# Patient Record
Sex: FEMALE | Race: OTHER | Employment: FULL TIME | URBAN - NONMETROPOLITAN AREA
[De-identification: names, ages, dates, MRNs, and addresses within clinical notes are randomized per-mention and may not be internally consistent; named-entity substitution may affect disease eponyms.]

---

## 2017-07-25 ENCOUNTER — HOSPITAL ENCOUNTER (OUTPATIENT)
Dept: CT IMAGING | Age: 52
Discharge: HOME | End: 2017-07-25
Payer: COMMERCIAL

## 2017-07-25 DIAGNOSIS — R10.9 ABDOMINAL PAIN, UNSPECIFIED LOCATION: ICD-10-CM

## 2017-07-25 PROCEDURE — 6360000004 HC RX CONTRAST MEDICATION: Performed by: FAMILY MEDICINE

## 2017-07-25 PROCEDURE — 74177 CT ABD & PELVIS W/CONTRAST: CPT

## 2017-07-25 RX ADMIN — IOPAMIDOL 100 ML: 755 INJECTION, SOLUTION INTRAVENOUS at 10:51

## 2017-07-25 RX ADMIN — IOHEXOL 50 ML: 240 INJECTION, SOLUTION INTRATHECAL; INTRAVASCULAR; INTRAVENOUS; ORAL at 10:51

## 2019-01-18 ENCOUNTER — HOSPITAL ENCOUNTER (OUTPATIENT)
Dept: CT IMAGING | Age: 54
Discharge: HOME | End: 2019-01-18
Payer: COMMERCIAL

## 2019-01-18 DIAGNOSIS — R35.89 POLYURIA: ICD-10-CM

## 2019-01-18 DIAGNOSIS — R10.9 ABDOMINAL PAIN, UNSPECIFIED ABDOMINAL LOCATION: ICD-10-CM

## 2019-01-18 PROCEDURE — 6360000004 HC RX CONTRAST MEDICATION: Performed by: FAMILY MEDICINE

## 2019-01-18 PROCEDURE — 74177 CT ABD & PELVIS W/CONTRAST: CPT

## 2019-01-18 RX ADMIN — IOHEXOL 50 ML: 240 INJECTION, SOLUTION INTRATHECAL; INTRAVASCULAR; INTRAVENOUS; ORAL at 14:22

## 2019-01-18 RX ADMIN — IOPAMIDOL 100 ML: 755 INJECTION, SOLUTION INTRAVENOUS at 14:21

## 2019-01-28 ENCOUNTER — HOSPITAL ENCOUNTER (OUTPATIENT)
Dept: ULTRASOUND IMAGING | Age: 54
Discharge: HOME | End: 2019-01-28
Payer: COMMERCIAL

## 2019-01-28 DIAGNOSIS — N83.8 ENLARGED OVARY: ICD-10-CM

## 2019-01-28 PROCEDURE — 76830 TRANSVAGINAL US NON-OB: CPT

## 2019-03-22 ENCOUNTER — OFFICE VISIT (OUTPATIENT)
Dept: SURGERY | Age: 54
End: 2019-03-22
Payer: COMMERCIAL

## 2019-03-22 VITALS
DIASTOLIC BLOOD PRESSURE: 70 MMHG | HEART RATE: 75 BPM | RESPIRATION RATE: 20 BRPM | WEIGHT: 196 LBS | HEIGHT: 60 IN | SYSTOLIC BLOOD PRESSURE: 128 MMHG | TEMPERATURE: 98.2 F | OXYGEN SATURATION: 99 % | BODY MASS INDEX: 38.48 KG/M2

## 2019-03-22 DIAGNOSIS — K59.09 OTHER CONSTIPATION: ICD-10-CM

## 2019-03-22 DIAGNOSIS — R10.32 ABDOMINAL PAIN, LEFT LOWER QUADRANT: Primary | ICD-10-CM

## 2019-03-22 DIAGNOSIS — K64.8 HEMORRHOIDS, INTERNAL: ICD-10-CM

## 2019-03-22 PROCEDURE — 3017F COLORECTAL CA SCREEN DOC REV: CPT | Performed by: SURGERY

## 2019-03-22 PROCEDURE — G8484 FLU IMMUNIZE NO ADMIN: HCPCS | Performed by: SURGERY

## 2019-03-22 PROCEDURE — 99213 OFFICE O/P EST LOW 20 MIN: CPT | Performed by: SURGERY

## 2019-03-22 PROCEDURE — 1036F TOBACCO NON-USER: CPT | Performed by: SURGERY

## 2019-03-22 PROCEDURE — G8427 DOCREV CUR MEDS BY ELIG CLIN: HCPCS | Performed by: SURGERY

## 2019-03-22 PROCEDURE — G8417 CALC BMI ABV UP PARAM F/U: HCPCS | Performed by: SURGERY

## 2019-03-22 RX ORDER — HYDROCORTISONE ACETATE 25 MG/1
25 SUPPOSITORY RECTAL 2 TIMES DAILY PRN
Qty: 20 SUPPOSITORY | Refills: 0 | Status: SHIPPED | OUTPATIENT
Start: 2019-03-22 | End: 2019-04-09

## 2019-03-22 ASSESSMENT — ENCOUNTER SYMPTOMS
EYE REDNESS: 0
WHEEZING: 0
ANAL BLEEDING: 0
COLOR CHANGE: 0
BACK PAIN: 0
SINUS PRESSURE: 0
ABDOMINAL DISTENTION: 1
APNEA: 0
STRIDOR: 0
CHOKING: 0
SINUS PAIN: 0
CHEST TIGHTNESS: 0
RHINORRHEA: 0
SHORTNESS OF BREATH: 0
VOMITING: 0
BLOOD IN STOOL: 0
TROUBLE SWALLOWING: 0
EYE ITCHING: 0
EYE DISCHARGE: 0
NAUSEA: 0
FACIAL SWELLING: 0
ABDOMINAL PAIN: 1
RECTAL PAIN: 0
CONSTIPATION: 1
VOICE CHANGE: 0
PHOTOPHOBIA: 0
SORE THROAT: 0
DIARRHEA: 0
EYE PAIN: 0
COUGH: 0

## 2019-03-25 ENCOUNTER — TELEPHONE (OUTPATIENT)
Dept: SURGERY | Age: 54
End: 2019-03-25

## 2019-04-08 PROBLEM — K59.09 OTHER CONSTIPATION: Status: ACTIVE | Noted: 2019-04-08

## 2019-04-08 NOTE — PROGRESS NOTES
[facility] Steward. [facility]  Established Patient Evaluation in Office  Pt Name: Jose Luis Alston  Date of Birth 1965   Today's Date: 4/9/2019  Medical Record Number: [id number]  Referring Provider: No ref. provider found  Primary Care Provider: Amy Yee MD  Chief Complaint   Patient presents with    2 Week Follow-Up     Last seen in the office on 3/22/19-Abdominal pain,LLQ pain,hemorrhoids     ASSESSMENT       Diagnosis Orders   1. Abdominal pain, left lower quadrant     2. Other constipation     3. Hemorrhoids, internal       Past Medical History:   Diagnosis Date    Anxiety     Depression     Not taking any medications    Diverticulitis     Hypertension     Interstitial cystitis     OCD (obsessive compulsive disorder)     Restless leg syndrome     Takes medications as needed    Sleep apnea     uses cpap      PLANS       1. Her symptoms have almost resolved just with fiber therapy and anusol cream.  2. Continue to encourage dietary fiber, wean use of anusol. 3. No evidence of diverticulitis clinically or by CT scan  4. No surgical intervention indicated or required  5. Follow up as needed. Ana Hung is a 62 y. o.female who presents with abdominal pain, constipation, hemorrhoids. She had a robotic sigmoid colectomy in 2016 for diverticular disease after several significant episodes. She has been having some abdominal pain on the LLQ for about 2 months. She describes sharp pain, with some radiation to the left flank and across the lower abdomen of moderate severity. Films performed and listed below. She was seen by Gyne who thought the left ovary pathology was not significant and sent here for evaluation. She also complains of constipation but does not drink much water or have much fiber intake. She states shes active which includes working every day but no exercise.  She does not \"look\" at her bowel habits to answer any questions about consistency or color. She also complains of rectal pain with the bowel movements and feels like she is \"sitting\" on something after bowel movements. Since last office visit, she has been using fibercon tablets. She is having regular soft bowel movements and no further anal pain. The only time she has abdominal pain is when she forgets to take the fibercon. I have personally reviewed the following films:  CT 1/18/19 showing an adnexal cyst not colonic inflammation or other general surgery pathology. Past Medical History  Past Medical History:   Diagnosis Date    Anxiety     Depression     Not taking any medications    Diverticulitis     Hypertension     Interstitial cystitis     OCD (obsessive compulsive disorder)     Restless leg syndrome     Takes medications as needed    Sleep apnea     uses cpap     Past Surgical History  Past Surgical History:   Procedure Laterality Date    ABDOMINAL ADHESION SURGERY  10-13-09    BLADDER SURGERY  10-13-09    cystoscopy    CERVICAL DISC SURGERY  2015    COLECTOMY  2016    Dr. Patria Mcintyre      last one 2015??    ENDOMETRIAL BIOPSY  9-21-09    EYE SURGERY      lasik    FOOT SURGERY Left 02/23/2017    LEODAN/AKIN LEFT (LEODAN BUNIONECTOMY/RADHA OSTEOTOMY, DEROTATIONAL ARTHROPLASTY 5TH.    HYSTERECTOMY  10-13-09    partial     Medications  Current Outpatient Medications   Medication Sig Dispense Refill    citalopram (CELEXA) 20 MG tablet   3    hydrocortisone (ANUSOL-HC) 2.5 % rectal cream Place rectally 2 times daily Place rectally 2 times daily.  lisinopril-hydrochlorothiazide (PRINZIDE;ZESTORETIC) 10-12.5 MG per tablet Take 1 tablet by mouth daily. No current facility-administered medications for this visit. Allergies  has No Known Allergies. Family History  family history includes Cirrhosis in her father; Diabetes in her father and sister; Heart Disease in her father; High Blood Pressure in her father and sister.   Social History   reports that she has never smoked. She has never used smokeless tobacco. She reports that she drinks alcohol. She reports that she does not use drugs. Health Screening Exams  Health Maintenance   Topic Date Due    Hepatitis C screen  1965    HIV screen  02/19/1978    DTaP/Tdap/Td vaccine (1 - Tdap) 02/19/1982    Cervical cancer screen  02/19/1984    Lipid screen  02/19/2003    Diabetes screen  02/19/2003    Breast cancer screen  02/19/2013    Shingles Vaccine (1 of 2) 02/19/2013    Colon cancer screen colonoscopy  02/19/2013    Potassium monitoring  02/11/2018    Creatinine monitoring  02/11/2018    Flu vaccine (Season Ended) 09/01/2019    Pneumococcal 0-64 years Vaccine  Aged Out     Review of Systems  Constitutional: Positive for activity change and fatigue. Negative for appetite change, chills, diaphoresis, fever and unexpected weight change. HENT: Negative for congestion, dental problem, drooling, ear discharge, ear pain, facial swelling, hearing loss, mouth sores, nosebleeds, postnasal drip, rhinorrhea, sinus pressure, sinus pain, sneezing, sore throat, tinnitus, trouble swallowing and voice change. Eyes: Negative for photophobia, pain, discharge, redness, itching and visual disturbance. Respiratory: Negative for apnea, cough, choking, chest tightness, shortness of breath, wheezing and stridor. Cardiovascular: Negative for chest pain, palpitations and leg swelling. Gastrointestinal:  Negative for anal bleeding, blood in stool, diarrhea, nausea, rectal pain and vomiting. Genitourinary: Negative for decreased urine volume, difficulty urinating, dyspareunia, dysuria, enuresis, flank pain, frequency, genital sores, hematuria, menstrual problem, pelvic pain, urgency, vaginal bleeding, vaginal discharge and vaginal pain. Musculoskeletal: Negative for arthralgias, back pain, gait problem, joint swelling, myalgias, neck pain and neck stiffness.    Skin: Negative for color change, pallor, rash and wound.   Neurological: Positive for headaches. Negative for dizziness, tremors, seizures, syncope, facial asymmetry, speech difficulty, weakness, light-headedness and numbness. Hematological: Negative for adenopathy. Does not bruise/bleed easily. Psychiatric/Behavioral: Negative for agitation, behavioral problems, confusion, decreased concentration, dysphoric mood, hallucinations, self-injury, sleep disturbance and suicidal ideas. The patient is not nervous/anxious and is not hyperactive. OBJECTIVE    VITALS:  height is 5' (1.524 m) and weight is 199 lb (90.3 kg). Her tympanic temperature is 98.5 °F (36.9 °C). Her blood pressure is 116/78 and her pulse is 71. Her respiration is 18 and oxygen saturation is 98%. Pain Score:   3 Body mass index is 38.86 kg/m². CONSTITUTIONAL: Alert and oriented times 3, no acute distress and cooperative to examination with proper mood and affect. SKIN: Skin color, texture, turgor normal. No rashes or lesions. LYMPH: no cervical nodes, no inguinal nodes  HEENT: Head is normocephalic, atraumatic. EOMI, PERRLA. NECK: Supple, symmetrical, trachea midline, no adenopathy, thyroid symmetric, not enlarged and no tenderness, skin normal.  CHEST/LUNGS: chest symmetric with normal A/P diameter, normal respiratory rate and rhythm, lungs clear to auscultation without wheezes, rales or rhonchi. No accessory muscle use. Scars None. CARDIOVASCULAR: Heart sounds are normal.  Regular rate and rhythm without murmur, gallop or rub. Normal S1 and S2. Carotid and femoral pulses 2+/4 and equal bilaterally. ABDOMEN: obese robotic scar(s) present. Normal bowel sounds. No bruits. Soft, non distended, no masses or organomegaly. no evidence of hernia. Percussion: Normal without hepatosplenomegally. Tenderness on the left lateral portion of her extraction scar has improved. RECTAL: deferred, not clinically indicated  NEUROLOGIC: There are no focalizing motor or sensory deficits.  CN II-XII are

## 2019-04-09 ENCOUNTER — OFFICE VISIT (OUTPATIENT)
Dept: SURGERY | Age: 54
End: 2019-04-09
Payer: COMMERCIAL

## 2019-04-09 VITALS
DIASTOLIC BLOOD PRESSURE: 78 MMHG | BODY MASS INDEX: 39.07 KG/M2 | TEMPERATURE: 98.5 F | SYSTOLIC BLOOD PRESSURE: 116 MMHG | HEIGHT: 60 IN | HEART RATE: 71 BPM | WEIGHT: 199 LBS | RESPIRATION RATE: 18 BRPM | OXYGEN SATURATION: 98 %

## 2019-04-09 DIAGNOSIS — R10.32 ABDOMINAL PAIN, LEFT LOWER QUADRANT: Primary | ICD-10-CM

## 2019-04-09 DIAGNOSIS — K64.8 HEMORRHOIDS, INTERNAL: ICD-10-CM

## 2019-04-09 DIAGNOSIS — K59.09 OTHER CONSTIPATION: ICD-10-CM

## 2019-04-09 PROCEDURE — 1036F TOBACCO NON-USER: CPT | Performed by: SURGERY

## 2019-04-09 PROCEDURE — G8427 DOCREV CUR MEDS BY ELIG CLIN: HCPCS | Performed by: SURGERY

## 2019-04-09 PROCEDURE — 99212 OFFICE O/P EST SF 10 MIN: CPT | Performed by: SURGERY

## 2019-04-09 PROCEDURE — 3017F COLORECTAL CA SCREEN DOC REV: CPT | Performed by: SURGERY

## 2019-04-09 PROCEDURE — G8417 CALC BMI ABV UP PARAM F/U: HCPCS | Performed by: SURGERY

## 2019-04-09 RX ORDER — CITALOPRAM 20 MG/1
TABLET ORAL
Refills: 3 | COMMUNITY
Start: 2019-03-28 | End: 2019-10-30

## 2019-04-09 NOTE — LETTER
[facility]  Kacie Sena. Jesusita Catalan  Phone- [phone]  Fax [phone]    Pt Name: Mihaela Mittal  Medical Record Number: [id number]  Date of Birth 1965   Today's Date: 4/9/2019    Morenita Sherwood was evaluated in the office today. My assessment and plans are listed below. ASSESSMENT         Diagnosis Orders   1. Abdominal pain, left lower quadrant     2. Other constipation     3. Hemorrhoids, internal       Past Medical History:   Diagnosis Date    Anxiety     Depression     Not taking any medications    Diverticulitis     Hypertension     Interstitial cystitis     OCD (obsessive compulsive disorder)     Restless leg syndrome     Takes medications as needed    Sleep apnea     uses cpap     PLANS:       1. Her symptoms have almost resolved just with fiber therapy and anusol cream.  2. Continue to encourage dietary fiber, wean use of anusol. 3. No evidence of diverticulitis clinically or by CT scan  4. No surgical intervention indicated or required  5. Follow up as needed. If I can provide any additional assistance or you have any concerns, please feel free to contact me. Thank you for allowing to participate in the care of your patients. Sincerely,      Jesusita Ramirez

## 2019-08-23 ENCOUNTER — HOSPITAL ENCOUNTER (OUTPATIENT)
Dept: PULMONOLOGY | Age: 54
Discharge: HOME | End: 2019-08-23
Payer: COMMERCIAL

## 2019-08-23 PROCEDURE — 94010 BREATHING CAPACITY TEST: CPT

## 2019-09-02 NOTE — PROGRESS NOTES
CPAP device. She follows with Dr. Layne Ochoa at [place] [address] for her sleep apnea management. -Depression. -Anxiety.  -Hypertension.  -Restless leg syndrome. she is currently on treatment with Mirapex. She follows with Dr. María Sky.  -S/p Hysterectomy. -Acute maxillary sinusitis- not under control. Recommendations/Plan:  -Please obtain her CXR films on a CD dated 05/09/2019 done at [address] before next clinic visit. - Start patient on Flonase 50mcg/INH 2sprays each nostril daily. She  was informed about adverse effects of Flonase. She was demonstrated in my office how to use Flonase. She verbalizes understanding.  -She was advised to stop taking Amoxicillin- no significant response.  - Doxycycline 100mg po bid for 14 days . No refills.  -She was advised to stop taking Losartan ( Cozaar) due to her chronic cough ( May be side effect from ARB inhibitor i.e Losartan) after discussing with her family physician. Her family physician need to start on alternate medication with out cough as side effect for optimal control of blood pressure. - Schedule patient for full pulmonary function tests before clinic visit if her cough don't improve. To be done 2days before clinic visit.  -Schedule patient for Methacholine challenge test before clinic visit  to further evaluate for hyperreactive air way disease before clinic visit  if her cough don't improve. To be done 2days before clinic visit.  -She was advised to continue current Prilosec( Omeprazole) 40mg po daily as prescribed by her family physician.  -The pathophysiology of reflux is discussed. Anti-reflux measures such as raising the head of the bed, avoiding tight clothing or belts, avoiding eating late at night and not lying down shortly after mealtime and achieving weight loss are discussed.  Avoid ASA, NSAID's, caffeine, peppermints, alcohol and tobacco. Patient should alert me if there are persistent symptoms,

## 2019-09-09 ENCOUNTER — HOSPITAL ENCOUNTER (OUTPATIENT)
Dept: GENERAL RADIOLOGY | Age: 54
Discharge: HOME | End: 2019-09-09

## 2019-09-09 ENCOUNTER — OFFICE VISIT (OUTPATIENT)
Dept: PULMONOLOGY | Age: 54
End: 2019-09-09
Payer: COMMERCIAL

## 2019-09-09 VITALS
HEART RATE: 73 BPM | BODY MASS INDEX: 38.95 KG/M2 | OXYGEN SATURATION: 97 % | WEIGHT: 198.4 LBS | HEIGHT: 60 IN | TEMPERATURE: 99.1 F | DIASTOLIC BLOOD PRESSURE: 86 MMHG | SYSTOLIC BLOOD PRESSURE: 124 MMHG

## 2019-09-09 DIAGNOSIS — Z00.6 EXAMINATION FOR NORMAL COMPARISON FOR CLINICAL RESEARCH: ICD-10-CM

## 2019-09-09 DIAGNOSIS — J01.00 ACUTE MAXILLARY SINUSITIS, RECURRENCE NOT SPECIFIED: ICD-10-CM

## 2019-09-09 DIAGNOSIS — R05.3 CHRONIC COUGH: Primary | ICD-10-CM

## 2019-09-09 DIAGNOSIS — J30.9 ALLERGIC RHINITIS, UNSPECIFIED SEASONALITY, UNSPECIFIED TRIGGER: ICD-10-CM

## 2019-09-09 PROCEDURE — 3017F COLORECTAL CA SCREEN DOC REV: CPT | Performed by: INTERNAL MEDICINE

## 2019-09-09 PROCEDURE — 1036F TOBACCO NON-USER: CPT | Performed by: INTERNAL MEDICINE

## 2019-09-09 PROCEDURE — 3209999900 XR COMPARISON OF OUTSIDE FILMS

## 2019-09-09 PROCEDURE — G8417 CALC BMI ABV UP PARAM F/U: HCPCS | Performed by: INTERNAL MEDICINE

## 2019-09-09 PROCEDURE — 99205 OFFICE O/P NEW HI 60 MIN: CPT | Performed by: INTERNAL MEDICINE

## 2019-09-09 PROCEDURE — G8427 DOCREV CUR MEDS BY ELIG CLIN: HCPCS | Performed by: INTERNAL MEDICINE

## 2019-09-09 RX ORDER — HYDROCHLOROTHIAZIDE 12.5 MG/1
25 TABLET ORAL DAILY
COMMUNITY
End: 2022-06-24 | Stop reason: SDUPTHER

## 2019-09-09 RX ORDER — LOSARTAN POTASSIUM 50 MG/1
50 TABLET ORAL DAILY
COMMUNITY
End: 2019-10-30 | Stop reason: ALTCHOICE

## 2019-09-09 RX ORDER — FLUTICASONE PROPIONATE 50 MCG
2 SPRAY, SUSPENSION (ML) NASAL DAILY
Qty: 1 BOTTLE | Refills: 11 | Status: SHIPPED | OUTPATIENT
Start: 2019-09-09 | End: 2022-06-24

## 2019-09-09 RX ORDER — BUSPIRONE HYDROCHLORIDE 10 MG/1
10 TABLET ORAL 2 TIMES DAILY
COMMUNITY
End: 2022-07-08

## 2019-09-09 RX ORDER — ESCITALOPRAM OXALATE 20 MG/1
20 TABLET ORAL DAILY
COMMUNITY

## 2019-09-09 RX ORDER — DOXYCYCLINE HYCLATE 100 MG/1
100 CAPSULE ORAL 2 TIMES DAILY
Qty: 28 CAPSULE | Refills: 0 | Status: SHIPPED | OUTPATIENT
Start: 2019-09-09 | End: 2019-09-23

## 2019-09-09 RX ORDER — MONTELUKAST SODIUM 10 MG/1
10 TABLET ORAL NIGHTLY
COMMUNITY

## 2019-09-09 RX ORDER — OMEPRAZOLE 40 MG/1
40 CAPSULE, DELAYED RELEASE ORAL DAILY
COMMUNITY

## 2019-09-09 NOTE — PATIENT INSTRUCTIONS
Recommendations/Plan:  -Please obtain her CXR films on a CD dated 05/09/2019 done at [address] before next clinic visit. - Start patient on Flonase 50mcg/INH 2sprays each nostril daily. She  was informed about adverse effects of Flonase. She was demonstrated in my office how to use Flonase. She verbalizes understanding.  -She was advised to stop taking Amoxicillin- no significant response.  - Doxycycline 100mg po bid for 14 days . No refills.  -She was advised to stop taking Losartan ( Cozaar) due to her chronic cough ( May be side effect from ARB inhibitor i.e Losartan) after discussing with her family physician. Her family physician need to start on alternate medication with out cough as side effect for optimal control of blood pressure. - Schedule patient for full pulmonary function tests before clinic visit if her cough don't improve. To be done 2days before clinic visit.  -Schedule patient for Methacholine challenge test before clinic visit  to further evaluate for hyperreactive air way disease before clinic visit  if her cough don't improve. To be done 2days before clinic visit.  -She was advised to continue current Prilosec( Omeprazole) 40mg po daily as prescribed by her family physician.  -The pathophysiology of reflux is discussed. Anti-reflux measures such as raising the head of the bed, avoiding tight clothing or belts, avoiding eating late at night and not lying down shortly after mealtime and achieving weight loss are discussed. Avoid ASA, NSAID's, caffeine, peppermints, alcohol and tobacco. Patient should alert me if there are persistent symptoms, dysphagia, weight loss or GI bleeding.   -She was advised to keep scheduled follow up appointment with Dr. Eduar Hunt for her sleep apnea and rest less legs syndrome .  -Schedule patient for follow up with my clinic in 3months for clinical re evaluation.  Patient advised to make early appointment if needed.  -She educated

## 2019-10-30 ENCOUNTER — HOSPITAL ENCOUNTER (EMERGENCY)
Age: 54
Discharge: HOME | DRG: 743 | End: 2019-10-30
Attending: EMERGENCY MEDICINE
Payer: COMMERCIAL

## 2019-10-30 ENCOUNTER — APPOINTMENT (OUTPATIENT)
Dept: CT IMAGING | Age: 54
DRG: 743 | End: 2019-10-30
Payer: COMMERCIAL

## 2019-10-30 VITALS
BODY MASS INDEX: 39.27 KG/M2 | HEIGHT: 60 IN | TEMPERATURE: 100.2 F | WEIGHT: 200 LBS | DIASTOLIC BLOOD PRESSURE: 35 MMHG | RESPIRATION RATE: 16 BRPM | OXYGEN SATURATION: 100 % | HEART RATE: 82 BPM | SYSTOLIC BLOOD PRESSURE: 100 MMHG

## 2019-10-30 DIAGNOSIS — R10.32 ABDOMINAL PAIN, LEFT LOWER QUADRANT: Primary | ICD-10-CM

## 2019-10-30 DIAGNOSIS — R19.00 PELVIC MASS IN FEMALE: ICD-10-CM

## 2019-10-30 LAB
ALBUMIN SERPL-MCNC: 3.7 GM/DL (ref 3.4–5)
ALP BLD-CCNC: 105 U/L (ref 46–116)
ALT SERPL-CCNC: 25 U/L (ref 14–63)
AMORPHOUS: ABNORMAL
ANION GAP: 10 MEQ/L (ref 8–16)
AST SERPL-CCNC: 18 U/L (ref 15–37)
BACTERIA: ABNORMAL
BASOPHILS # BLD: 0.4 % (ref 0–3)
BILIRUB SERPL-MCNC: 0.7 MG/DL (ref 0.2–1)
BILIRUBIN URINE: ABNORMAL
BLOOD, URINE: ABNORMAL
BUN BLDV-MCNC: 11 MG/DL (ref 7–18)
CASTS UA: ABNORMAL /LPF
CHARACTER, URINE: ABNORMAL
CHLORIDE BLD-SCNC: 99 MEQ/L (ref 98–107)
CO2: 28 MEQ/L (ref 21–32)
COLOR: ABNORMAL
CREAT SERPL-MCNC: 1 MG/DL (ref 0.6–1.3)
CRYSTALS, UA: ABNORMAL
EOSINOPHILS RELATIVE PERCENT: 1.3 % (ref 0–4)
EPITHELIAL CELLS, UA: ABNORMAL /HPF
GFR, ESTIMATED: 61 ML/MIN/1.73M2
GLUCOSE BLD-MCNC: 136 MG/DL (ref 74–106)
GLUCOSE, URINE: NEGATIVE MG/DL
HCT VFR BLD CALC: 46.9 % (ref 37–47)
HEMOCCULT STL QL: NEGATIVE
HEMOGLOBIN: 15.5 GM/DL (ref 12–16)
ICTOTEST: NEGATIVE
KETONES, URINE: NEGATIVE
LEUKOCYTE ESTERASE, URINE: NEGATIVE
LIPASE: 113 U/L (ref 73–393)
LYMPHOCYTES # BLD: 8 % (ref 15–47)
MAGNESIUM: 2.1 MG/DL (ref 1.8–2.4)
MCH RBC QN AUTO: 31.3 PG (ref 27–31)
MCHC RBC AUTO-ENTMCNC: 33.1 GM/DL (ref 33–37)
MCV RBC AUTO: 94.5 FL (ref 81–99)
MONOCYTES: 4.8 % (ref 0–12)
MUCUS: ABNORMAL
NITRITE, URINE: NEGATIVE
PDW BLD-RTO: 13.6 % (ref 11.5–14.5)
PH UA: 5.5 (ref 5–9)
PLATELET # BLD: 201 THOU/MM3 (ref 130–400)
PMV BLD AUTO: 7 FL (ref 7.4–10.4)
POC CALCIUM: 8.7 MG/DL (ref 8.5–10.1)
POTASSIUM SERPL-SCNC: 3.8 MEQ/L (ref 3.5–5.1)
PROTEIN UA: 30 MG/DL
RBC # BLD: 4.97 MILL/MM3 (ref 4.2–5.4)
RBC UA: ABNORMAL /HPF
REFLEX TO URINE C & S: ABNORMAL
SEGS: 85.5 % (ref 43–75)
SODIUM BLD-SCNC: 137 MEQ/L (ref 136–145)
SPECIFIC GRAVITY UA: 1.02 (ref 1–1.03)
TOTAL PROTEIN: 8.4 GM/DL (ref 6.4–8.2)
UROBILINOGEN, URINE: 0.2 EU/DL (ref 0–1)
WBC # BLD: 10.3 THOU/MM3 (ref 4.8–10.8)
WBC UA: ABNORMAL /HPF

## 2019-10-30 PROCEDURE — 85025 COMPLETE CBC W/AUTO DIFF WBC: CPT

## 2019-10-30 PROCEDURE — 82272 OCCULT BLD FECES 1-3 TESTS: CPT

## 2019-10-30 PROCEDURE — 81001 URINALYSIS AUTO W/SCOPE: CPT

## 2019-10-30 PROCEDURE — 2580000003 HC RX 258: Performed by: EMERGENCY MEDICINE

## 2019-10-30 PROCEDURE — 2709999900 HC NON-CHARGEABLE SUPPLY

## 2019-10-30 PROCEDURE — 80053 COMPREHEN METABOLIC PANEL: CPT

## 2019-10-30 PROCEDURE — 83690 ASSAY OF LIPASE: CPT

## 2019-10-30 PROCEDURE — 36415 COLL VENOUS BLD VENIPUNCTURE: CPT

## 2019-10-30 PROCEDURE — 74176 CT ABD & PELVIS W/O CONTRAST: CPT

## 2019-10-30 PROCEDURE — 83735 ASSAY OF MAGNESIUM: CPT

## 2019-10-30 PROCEDURE — 6360000002 HC RX W HCPCS: Performed by: EMERGENCY MEDICINE

## 2019-10-30 PROCEDURE — 96375 TX/PRO/DX INJ NEW DRUG ADDON: CPT

## 2019-10-30 PROCEDURE — 87086 URINE CULTURE/COLONY COUNT: CPT

## 2019-10-30 PROCEDURE — 96374 THER/PROPH/DIAG INJ IV PUSH: CPT

## 2019-10-30 PROCEDURE — 99284 EMERGENCY DEPT VISIT MOD MDM: CPT

## 2019-10-30 RX ORDER — ONDANSETRON 2 MG/ML
4 INJECTION INTRAMUSCULAR; INTRAVENOUS ONCE
Status: COMPLETED | OUTPATIENT
Start: 2019-10-30 | End: 2019-10-30

## 2019-10-30 RX ORDER — TRAMADOL HYDROCHLORIDE 50 MG/1
50 TABLET ORAL EVERY 4 HOURS PRN
Qty: 18 TABLET | Refills: 0 | Status: ON HOLD | OUTPATIENT
Start: 2019-10-30 | End: 2019-11-05 | Stop reason: HOSPADM

## 2019-10-30 RX ORDER — NITROFURANTOIN 25; 75 MG/1; MG/1
100 CAPSULE ORAL 2 TIMES DAILY
Qty: 20 CAPSULE | Refills: 0 | Status: ON HOLD | OUTPATIENT
Start: 2019-10-30 | End: 2019-11-05 | Stop reason: HOSPADM

## 2019-10-30 RX ORDER — ONDANSETRON 4 MG/1
4 TABLET, ORALLY DISINTEGRATING ORAL EVERY 8 HOURS PRN
Qty: 12 TABLET | Refills: 0 | Status: ON HOLD | OUTPATIENT
Start: 2019-10-30 | End: 2019-11-05 | Stop reason: HOSPADM

## 2019-10-30 RX ORDER — KETOROLAC TROMETHAMINE 30 MG/ML
30 INJECTION, SOLUTION INTRAMUSCULAR; INTRAVENOUS ONCE
Status: COMPLETED | OUTPATIENT
Start: 2019-10-30 | End: 2019-10-30

## 2019-10-30 RX ORDER — 0.9 % SODIUM CHLORIDE 0.9 %
1000 INTRAVENOUS SOLUTION INTRAVENOUS ONCE
Status: COMPLETED | OUTPATIENT
Start: 2019-10-30 | End: 2019-10-30

## 2019-10-30 RX ADMIN — SODIUM CHLORIDE 1000 ML: 9 INJECTION, SOLUTION INTRAVENOUS at 12:22

## 2019-10-30 RX ADMIN — KETOROLAC TROMETHAMINE 30 MG: 30 INJECTION, SOLUTION INTRAMUSCULAR at 12:23

## 2019-10-30 RX ADMIN — ONDANSETRON 4 MG: 2 INJECTION INTRAMUSCULAR; INTRAVENOUS at 12:22

## 2019-10-30 ASSESSMENT — PAIN DESCRIPTION - DESCRIPTORS: DESCRIPTORS: ACHING;CRAMPING

## 2019-10-30 ASSESSMENT — PAIN SCALES - GENERAL
PAINLEVEL_OUTOF10: 6
PAINLEVEL_OUTOF10: 6

## 2019-10-30 ASSESSMENT — PAIN DESCRIPTION - PAIN TYPE: TYPE: ACUTE PAIN

## 2019-10-30 ASSESSMENT — PAIN DESCRIPTION - ORIENTATION: ORIENTATION: LEFT

## 2019-10-30 ASSESSMENT — PAIN DESCRIPTION - LOCATION: LOCATION: ABDOMEN

## 2019-10-31 ASSESSMENT — ENCOUNTER SYMPTOMS
CONSTIPATION: 0
ABDOMINAL PAIN: 1
DIARRHEA: 1
NAUSEA: 1
VOMITING: 0
RESPIRATORY NEGATIVE: 1

## 2019-11-01 ENCOUNTER — APPOINTMENT (OUTPATIENT)
Dept: ULTRASOUND IMAGING | Age: 54
DRG: 743 | End: 2019-11-01
Payer: COMMERCIAL

## 2019-11-01 ENCOUNTER — HOSPITAL ENCOUNTER (INPATIENT)
Age: 54
LOS: 4 days | Discharge: HOME | DRG: 743 | End: 2019-11-05
Attending: FAMILY MEDICINE | Admitting: OBSTETRICS & GYNECOLOGY
Payer: COMMERCIAL

## 2019-11-01 ENCOUNTER — ANESTHESIA EVENT (OUTPATIENT)
Dept: OPERATING ROOM | Age: 54
DRG: 743 | End: 2019-11-01
Payer: COMMERCIAL

## 2019-11-01 ENCOUNTER — ANESTHESIA (OUTPATIENT)
Dept: OPERATING ROOM | Age: 54
DRG: 743 | End: 2019-11-01
Payer: COMMERCIAL

## 2019-11-01 VITALS
OXYGEN SATURATION: 99 % | RESPIRATION RATE: 2 BRPM | DIASTOLIC BLOOD PRESSURE: 122 MMHG | SYSTOLIC BLOOD PRESSURE: 143 MMHG | TEMPERATURE: 101.5 F

## 2019-11-01 DIAGNOSIS — Z98.890 S/P EXPLORATORY LAPAROTOMY: ICD-10-CM

## 2019-11-01 DIAGNOSIS — N94.89 ADNEXAL MASS: Primary | ICD-10-CM

## 2019-11-01 LAB
ANION GAP: 8 MEQ/L (ref 8–16)
APTT: 32.9 SECONDS (ref 22–38)
BACTERIA: ABNORMAL /HPF
BACTERIA: ABNORMAL /HPF
BASOPHILS # BLD: 0.6 % (ref 0–3)
BILIRUBIN URINE: ABNORMAL
BILIRUBIN URINE: NEGATIVE
BLOOD, URINE: ABNORMAL
BLOOD, URINE: ABNORMAL
BUN BLDV-MCNC: 12 MG/DL (ref 7–18)
CA 125: 34 U/ML
CASTS 2: ABNORMAL /LPF
CASTS 2: ABNORMAL /LPF
CASTS UA: ABNORMAL /LPF
CASTS UA: ABNORMAL /LPF
CHARACTER, URINE: ABNORMAL
CHARACTER, URINE: CLEAR
CHLORIDE BLD-SCNC: 97 MEQ/L (ref 98–107)
CO2: 29 MEQ/L (ref 21–32)
COLOR: ABNORMAL
COLOR: ABNORMAL
CREAT SERPL-MCNC: 0.9 MG/DL (ref 0.6–1.3)
CRYSTALS, UA: ABNORMAL
CRYSTALS, UA: ABNORMAL
EOSINOPHILS RELATIVE PERCENT: 1.4 % (ref 0–4)
EPITHELIAL CELLS, UA: ABNORMAL /HPF
EPITHELIAL CELLS, UA: ABNORMAL /HPF
GFR, ESTIMATED: 69 ML/MIN/1.73M2
GLUCOSE BLD-MCNC: 155 MG/DL (ref 74–106)
GLUCOSE URINE: NEGATIVE MG/DL
GLUCOSE URINE: NEGATIVE MG/DL
HCT VFR BLD CALC: 43.2 % (ref 37–47)
HEMOGLOBIN: 14.4 GM/DL (ref 12–16)
ICTOTEST: NEGATIVE
INR BLD: 1.33 (ref 0.85–1.13)
KETONES, URINE: ABNORMAL
KETONES, URINE: NEGATIVE
LEUKOCYTE ESTERASE, URINE: ABNORMAL
LEUKOCYTE ESTERASE, URINE: NEGATIVE
LYMPHOCYTES # BLD: 8.7 % (ref 15–47)
MCH RBC QN AUTO: 31.5 PG (ref 27–31)
MCHC RBC AUTO-ENTMCNC: 33.3 GM/DL (ref 33–37)
MCV RBC AUTO: 94.4 FL (ref 81–99)
MISCELLANEOUS 2: ABNORMAL
MISCELLANEOUS 2: ABNORMAL
MONOCYTES: 7.1 % (ref 0–12)
MUCUS: ABNORMAL
NITRITE, URINE: NEGATIVE
NITRITE, URINE: NEGATIVE
PDW BLD-RTO: 13.5 % (ref 11.5–14.5)
PH UA: 5.5 (ref 5–9)
PH UA: 5.5 (ref 5–9)
PLATELET # BLD: 229 THOU/MM3 (ref 130–400)
PLATELET ESTIMATE: ABNORMAL
PMV BLD AUTO: 6.9 FL (ref 7.4–10.4)
POC CALCIUM: 9.2 MG/DL (ref 8.5–10.1)
POTASSIUM SERPL-SCNC: 3.3 MEQ/L (ref 3.5–5.1)
PROTEIN UA: ABNORMAL
PROTEIN UA: NEGATIVE
RBC # BLD: 4.58 MILL/MM3 (ref 4.2–5.4)
RBC URINE: ABNORMAL /HPF
RBC URINE: ABNORMAL /HPF
RENAL EPITHELIAL, UA: ABNORMAL
RENAL EPITHELIAL, UA: ABNORMAL
SCAN OF BLOOD SMEAR: NORMAL
SEGS: 82.2 % (ref 43–75)
SODIUM BLD-SCNC: 134 MEQ/L (ref 136–145)
SPECIFIC GRAVITY, URINE: 1.02 (ref 1–1.03)
SPECIFIC GRAVITY, URINE: 1.02 (ref 1–1.03)
URINE CULTURE, ROUTINE: NORMAL
UROBILINOGEN, URINE: 0.2 EU/DL (ref 0–1)
UROBILINOGEN, URINE: 1 EU/DL (ref 0–1)
WBC # BLD: 14.5 THOU/MM3 (ref 4.8–10.8)
WBC UA: ABNORMAL /HPF
WBC UA: ABNORMAL /HPF
YEAST: ABNORMAL
YEAST: ABNORMAL

## 2019-11-01 PROCEDURE — 87075 CULTR BACTERIA EXCEPT BLOOD: CPT

## 2019-11-01 PROCEDURE — 0UT70ZZ RESECTION OF BILATERAL FALLOPIAN TUBES, OPEN APPROACH: ICD-10-PCS | Performed by: OBSTETRICS & GYNECOLOGY

## 2019-11-01 PROCEDURE — 6360000002 HC RX W HCPCS: Performed by: NURSE ANESTHETIST, CERTIFIED REGISTERED

## 2019-11-01 PROCEDURE — 3700000000 HC ANESTHESIA ATTENDED CARE: Performed by: OBSTETRICS & GYNECOLOGY

## 2019-11-01 PROCEDURE — 87077 CULTURE AEROBIC IDENTIFY: CPT

## 2019-11-01 PROCEDURE — 96376 TX/PRO/DX INJ SAME DRUG ADON: CPT

## 2019-11-01 PROCEDURE — 87070 CULTURE OTHR SPECIMN AEROBIC: CPT

## 2019-11-01 PROCEDURE — 80048 BASIC METABOLIC PNL TOTAL CA: CPT

## 2019-11-01 PROCEDURE — 2580000003 HC RX 258: Performed by: NURSE ANESTHETIST, CERTIFIED REGISTERED

## 2019-11-01 PROCEDURE — 0UT20ZZ RESECTION OF BILATERAL OVARIES, OPEN APPROACH: ICD-10-PCS | Performed by: OBSTETRICS & GYNECOLOGY

## 2019-11-01 PROCEDURE — 88112 CYTOPATH CELL ENHANCE TECH: CPT

## 2019-11-01 PROCEDURE — 85025 COMPLETE CBC W/AUTO DIFF WBC: CPT

## 2019-11-01 PROCEDURE — 6360000002 HC RX W HCPCS: Performed by: ANESTHESIOLOGY

## 2019-11-01 PROCEDURE — 85730 THROMBOPLASTIN TIME PARTIAL: CPT

## 2019-11-01 PROCEDURE — 86304 IMMUNOASSAY TUMOR CA 125: CPT

## 2019-11-01 PROCEDURE — 4500000002 HC ER NO CHARGE

## 2019-11-01 PROCEDURE — 6370000000 HC RX 637 (ALT 250 FOR IP): Performed by: NURSE ANESTHETIST, CERTIFIED REGISTERED

## 2019-11-01 PROCEDURE — 3700000001 HC ADD 15 MINUTES (ANESTHESIA): Performed by: OBSTETRICS & GYNECOLOGY

## 2019-11-01 PROCEDURE — 3600000013 HC SURGERY LEVEL 3 ADDTL 15MIN: Performed by: OBSTETRICS & GYNECOLOGY

## 2019-11-01 PROCEDURE — 7100000000 HC PACU RECOVERY - FIRST 15 MIN: Performed by: OBSTETRICS & GYNECOLOGY

## 2019-11-01 PROCEDURE — 3600000003 HC SURGERY LEVEL 3 BASE: Performed by: OBSTETRICS & GYNECOLOGY

## 2019-11-01 PROCEDURE — 96374 THER/PROPH/DIAG INJ IV PUSH: CPT

## 2019-11-01 PROCEDURE — 96375 TX/PRO/DX INJ NEW DRUG ADDON: CPT

## 2019-11-01 PROCEDURE — 2500000003 HC RX 250 WO HCPCS: Performed by: NURSE ANESTHETIST, CERTIFIED REGISTERED

## 2019-11-01 PROCEDURE — 2709999900 HC NON-CHARGEABLE SUPPLY

## 2019-11-01 PROCEDURE — 2500000003 HC RX 250 WO HCPCS: Performed by: OBSTETRICS & GYNECOLOGY

## 2019-11-01 PROCEDURE — 81001 URINALYSIS AUTO W/SCOPE: CPT

## 2019-11-01 PROCEDURE — 76830 TRANSVAGINAL US NON-OB: CPT

## 2019-11-01 PROCEDURE — 6360000002 HC RX W HCPCS

## 2019-11-01 PROCEDURE — 6360000002 HC RX W HCPCS: Performed by: FAMILY MEDICINE

## 2019-11-01 PROCEDURE — 2580000003 HC RX 258: Performed by: OBSTETRICS & GYNECOLOGY

## 2019-11-01 PROCEDURE — 6370000000 HC RX 637 (ALT 250 FOR IP): Performed by: OBSTETRICS & GYNECOLOGY

## 2019-11-01 PROCEDURE — 87186 SC STD MICRODIL/AGAR DIL: CPT

## 2019-11-01 PROCEDURE — 1200000000 HC SEMI PRIVATE

## 2019-11-01 PROCEDURE — 36415 COLL VENOUS BLD VENIPUNCTURE: CPT

## 2019-11-01 PROCEDURE — 2709999900 HC NON-CHARGEABLE SUPPLY: Performed by: OBSTETRICS & GYNECOLOGY

## 2019-11-01 PROCEDURE — 93975 VASCULAR STUDY: CPT

## 2019-11-01 PROCEDURE — 87205 SMEAR GRAM STAIN: CPT

## 2019-11-01 PROCEDURE — 0DQN0ZZ REPAIR SIGMOID COLON, OPEN APPROACH: ICD-10-PCS | Performed by: SURGERY

## 2019-11-01 PROCEDURE — 99284 EMERGENCY DEPT VISIT MOD MDM: CPT

## 2019-11-01 PROCEDURE — 6360000002 HC RX W HCPCS: Performed by: EMERGENCY MEDICINE

## 2019-11-01 PROCEDURE — 88305 TISSUE EXAM BY PATHOLOGIST: CPT

## 2019-11-01 PROCEDURE — 85610 PROTHROMBIN TIME: CPT

## 2019-11-01 PROCEDURE — 6360000002 HC RX W HCPCS: Performed by: OBSTETRICS & GYNECOLOGY

## 2019-11-01 PROCEDURE — 44604 SUTURE LARGE INTESTINE: CPT | Performed by: SURGERY

## 2019-11-01 PROCEDURE — 2580000003 HC RX 258: Performed by: EMERGENCY MEDICINE

## 2019-11-01 PROCEDURE — C1729 CATH, DRAINAGE: HCPCS | Performed by: OBSTETRICS & GYNECOLOGY

## 2019-11-01 PROCEDURE — 7100000001 HC PACU RECOVERY - ADDTL 15 MIN: Performed by: OBSTETRICS & GYNECOLOGY

## 2019-11-01 RX ORDER — FENTANYL CITRATE 50 UG/ML
50 INJECTION, SOLUTION INTRAMUSCULAR; INTRAVENOUS EVERY 5 MIN PRN
Status: DISCONTINUED | OUTPATIENT
Start: 2019-11-01 | End: 2019-11-01 | Stop reason: HOSPADM

## 2019-11-01 RX ORDER — SODIUM CHLORIDE 0.9 % (FLUSH) 0.9 %
10 SYRINGE (ML) INJECTION PRN
Status: DISCONTINUED | OUTPATIENT
Start: 2019-11-01 | End: 2019-11-05 | Stop reason: HOSPADM

## 2019-11-01 RX ORDER — SODIUM CHLORIDE 0.9 % (FLUSH) 0.9 %
10 SYRINGE (ML) INJECTION EVERY 12 HOURS SCHEDULED
Status: DISCONTINUED | OUTPATIENT
Start: 2019-11-01 | End: 2019-11-05 | Stop reason: HOSPADM

## 2019-11-01 RX ORDER — CLINDAMYCIN PHOSPHATE 900 MG/50ML
900 INJECTION INTRAVENOUS EVERY 8 HOURS
Status: CANCELLED | OUTPATIENT
Start: 2019-11-01

## 2019-11-01 RX ORDER — FENTANYL CITRATE 50 UG/ML
INJECTION, SOLUTION INTRAMUSCULAR; INTRAVENOUS PRN
Status: DISCONTINUED | OUTPATIENT
Start: 2019-11-01 | End: 2019-11-01 | Stop reason: SDUPTHER

## 2019-11-01 RX ORDER — ONDANSETRON 2 MG/ML
4 INJECTION INTRAMUSCULAR; INTRAVENOUS ONCE
Status: COMPLETED | OUTPATIENT
Start: 2019-11-01 | End: 2019-11-01

## 2019-11-01 RX ORDER — MEPERIDINE HYDROCHLORIDE 25 MG/ML
12.5 INJECTION INTRAMUSCULAR; INTRAVENOUS; SUBCUTANEOUS EVERY 5 MIN PRN
Status: DISCONTINUED | OUTPATIENT
Start: 2019-11-01 | End: 2019-11-01 | Stop reason: HOSPADM

## 2019-11-01 RX ORDER — FLUTICASONE PROPIONATE 50 MCG
2 SPRAY, SUSPENSION (ML) NASAL DAILY
Status: DISCONTINUED | OUTPATIENT
Start: 2019-11-01 | End: 2019-11-05 | Stop reason: HOSPADM

## 2019-11-01 RX ORDER — GLYCOPYRROLATE 1 MG/5 ML
SYRINGE (ML) INTRAVENOUS PRN
Status: DISCONTINUED | OUTPATIENT
Start: 2019-11-01 | End: 2019-11-01 | Stop reason: SDUPTHER

## 2019-11-01 RX ORDER — SODIUM CHLORIDE 9 MG/ML
INJECTION, SOLUTION INTRAVENOUS CONTINUOUS
Status: DISCONTINUED | OUTPATIENT
Start: 2019-11-01 | End: 2019-11-04

## 2019-11-01 RX ORDER — ONDANSETRON 2 MG/ML
4 INJECTION INTRAMUSCULAR; INTRAVENOUS
Status: DISCONTINUED | OUTPATIENT
Start: 2019-11-01 | End: 2019-11-01 | Stop reason: HOSPADM

## 2019-11-01 RX ORDER — CIPROFLOXACIN 2 MG/ML
400 INJECTION, SOLUTION INTRAVENOUS EVERY 12 HOURS
Status: DISCONTINUED | OUTPATIENT
Start: 2019-11-01 | End: 2019-11-04

## 2019-11-01 RX ORDER — BUSPIRONE HYDROCHLORIDE 10 MG/1
10 TABLET ORAL 2 TIMES DAILY
Status: DISCONTINUED | OUTPATIENT
Start: 2019-11-01 | End: 2019-11-05 | Stop reason: HOSPADM

## 2019-11-01 RX ORDER — ONDANSETRON 2 MG/ML
INJECTION INTRAMUSCULAR; INTRAVENOUS
Status: COMPLETED
Start: 2019-11-01 | End: 2019-11-01

## 2019-11-01 RX ORDER — ESCITALOPRAM OXALATE 20 MG/1
20 TABLET ORAL DAILY
Status: DISCONTINUED | OUTPATIENT
Start: 2019-11-01 | End: 2019-11-05 | Stop reason: HOSPADM

## 2019-11-01 RX ORDER — MORPHINE SULFATE 4 MG/ML
4 INJECTION, SOLUTION INTRAMUSCULAR; INTRAVENOUS
Status: DISCONTINUED | OUTPATIENT
Start: 2019-11-01 | End: 2019-11-05 | Stop reason: HOSPADM

## 2019-11-01 RX ORDER — ACETAMINOPHEN 325 MG/1
650 TABLET ORAL EVERY 4 HOURS PRN
Status: DISCONTINUED | OUTPATIENT
Start: 2019-11-01 | End: 2019-11-05 | Stop reason: HOSPADM

## 2019-11-01 RX ORDER — NEOSTIGMINE METHYLSULFATE 1 MG/ML
INJECTION, SOLUTION INTRAVENOUS PRN
Status: DISCONTINUED | OUTPATIENT
Start: 2019-11-01 | End: 2019-11-01 | Stop reason: SDUPTHER

## 2019-11-01 RX ORDER — DEXAMETHASONE SODIUM PHOSPHATE 4 MG/ML
INJECTION, SOLUTION INTRA-ARTICULAR; INTRALESIONAL; INTRAMUSCULAR; INTRAVENOUS; SOFT TISSUE PRN
Status: DISCONTINUED | OUTPATIENT
Start: 2019-11-01 | End: 2019-11-01 | Stop reason: SDUPTHER

## 2019-11-01 RX ORDER — HYDROCHLOROTHIAZIDE 25 MG/1
25 TABLET ORAL DAILY
Status: DISCONTINUED | OUTPATIENT
Start: 2019-11-01 | End: 2019-11-05 | Stop reason: HOSPADM

## 2019-11-01 RX ORDER — LABETALOL 20 MG/4 ML (5 MG/ML) INTRAVENOUS SYRINGE
5 EVERY 10 MIN PRN
Status: DISCONTINUED | OUTPATIENT
Start: 2019-11-01 | End: 2019-11-01 | Stop reason: HOSPADM

## 2019-11-01 RX ORDER — FENTANYL CITRATE 50 UG/ML
25 INJECTION, SOLUTION INTRAMUSCULAR; INTRAVENOUS EVERY 5 MIN PRN
Status: DISCONTINUED | OUTPATIENT
Start: 2019-11-01 | End: 2019-11-01 | Stop reason: HOSPADM

## 2019-11-01 RX ORDER — DOCUSATE SODIUM 100 MG/1
100 CAPSULE, LIQUID FILLED ORAL 2 TIMES DAILY
Status: DISCONTINUED | OUTPATIENT
Start: 2019-11-01 | End: 2019-11-05 | Stop reason: HOSPADM

## 2019-11-01 RX ORDER — PROMETHAZINE HYDROCHLORIDE 25 MG/ML
6.25 INJECTION, SOLUTION INTRAMUSCULAR; INTRAVENOUS
Status: DISCONTINUED | OUTPATIENT
Start: 2019-11-01 | End: 2019-11-01 | Stop reason: HOSPADM

## 2019-11-01 RX ORDER — CIPROFLOXACIN 2 MG/ML
INJECTION, SOLUTION INTRAVENOUS PRN
Status: DISCONTINUED | OUTPATIENT
Start: 2019-11-01 | End: 2019-11-01 | Stop reason: SDUPTHER

## 2019-11-01 RX ORDER — MONTELUKAST SODIUM 10 MG/1
10 TABLET ORAL NIGHTLY
Status: DISCONTINUED | OUTPATIENT
Start: 2019-11-01 | End: 2019-11-05 | Stop reason: HOSPADM

## 2019-11-01 RX ORDER — ONDANSETRON 2 MG/ML
INJECTION INTRAMUSCULAR; INTRAVENOUS PRN
Status: DISCONTINUED | OUTPATIENT
Start: 2019-11-01 | End: 2019-11-01 | Stop reason: SDUPTHER

## 2019-11-01 RX ORDER — CEFAZOLIN SODIUM 1 G/3ML
INJECTION, POWDER, FOR SOLUTION INTRAMUSCULAR; INTRAVENOUS PRN
Status: DISCONTINUED | OUTPATIENT
Start: 2019-11-01 | End: 2019-11-01 | Stop reason: SDUPTHER

## 2019-11-01 RX ORDER — HYDROCODONE BITARTRATE AND ACETAMINOPHEN 5; 325 MG/1; MG/1
2 TABLET ORAL EVERY 4 HOURS PRN
Status: DISCONTINUED | OUTPATIENT
Start: 2019-11-01 | End: 2019-11-04

## 2019-11-01 RX ORDER — SODIUM CHLORIDE 9 MG/ML
INJECTION, SOLUTION INTRAVENOUS CONTINUOUS
Status: DISCONTINUED | OUTPATIENT
Start: 2019-11-01 | End: 2019-11-01

## 2019-11-01 RX ORDER — LIDOCAINE HYDROCHLORIDE 20 MG/ML
INJECTION, SOLUTION INFILTRATION; PERINEURAL PRN
Status: DISCONTINUED | OUTPATIENT
Start: 2019-11-01 | End: 2019-11-01 | Stop reason: SDUPTHER

## 2019-11-01 RX ORDER — ONDANSETRON 2 MG/ML
4 INJECTION INTRAMUSCULAR; INTRAVENOUS EVERY 6 HOURS PRN
Status: DISCONTINUED | OUTPATIENT
Start: 2019-11-01 | End: 2019-11-05 | Stop reason: HOSPADM

## 2019-11-01 RX ORDER — 0.9 % SODIUM CHLORIDE 0.9 %
1000 INTRAVENOUS SOLUTION INTRAVENOUS ONCE
Status: COMPLETED | OUTPATIENT
Start: 2019-11-01 | End: 2019-11-01

## 2019-11-01 RX ORDER — HYDROCODONE BITARTRATE AND ACETAMINOPHEN 5; 325 MG/1; MG/1
1 TABLET ORAL EVERY 4 HOURS PRN
Status: DISCONTINUED | OUTPATIENT
Start: 2019-11-01 | End: 2019-11-04

## 2019-11-01 RX ORDER — SODIUM CHLORIDE, SODIUM LACTATE, POTASSIUM CHLORIDE, CALCIUM CHLORIDE 600; 310; 30; 20 MG/100ML; MG/100ML; MG/100ML; MG/100ML
INJECTION, SOLUTION INTRAVENOUS CONTINUOUS PRN
Status: DISCONTINUED | OUTPATIENT
Start: 2019-11-01 | End: 2019-11-01 | Stop reason: SDUPTHER

## 2019-11-01 RX ORDER — MORPHINE SULFATE 2 MG/ML
2 INJECTION, SOLUTION INTRAMUSCULAR; INTRAVENOUS
Status: DISCONTINUED | OUTPATIENT
Start: 2019-11-01 | End: 2019-11-05 | Stop reason: HOSPADM

## 2019-11-01 RX ORDER — ROCURONIUM BROMIDE 10 MG/ML
INJECTION, SOLUTION INTRAVENOUS PRN
Status: DISCONTINUED | OUTPATIENT
Start: 2019-11-01 | End: 2019-11-01 | Stop reason: SDUPTHER

## 2019-11-01 RX ORDER — KETOROLAC TROMETHAMINE 30 MG/ML
15 INJECTION, SOLUTION INTRAMUSCULAR; INTRAVENOUS EVERY 6 HOURS
Status: ACTIVE | OUTPATIENT
Start: 2019-11-01 | End: 2019-11-03

## 2019-11-01 RX ORDER — PROPOFOL 10 MG/ML
INJECTION, EMULSION INTRAVENOUS PRN
Status: DISCONTINUED | OUTPATIENT
Start: 2019-11-01 | End: 2019-11-01 | Stop reason: SDUPTHER

## 2019-11-01 RX ADMIN — HYDROCHLOROTHIAZIDE 25 MG: 25 TABLET ORAL at 14:12

## 2019-11-01 RX ADMIN — SODIUM CHLORIDE, POTASSIUM CHLORIDE, SODIUM LACTATE AND CALCIUM CHLORIDE: 600; 310; 30; 20 INJECTION, SOLUTION INTRAVENOUS at 09:30

## 2019-11-01 RX ADMIN — PROPOFOL 150 MG: 10 INJECTION, EMULSION INTRAVENOUS at 09:20

## 2019-11-01 RX ADMIN — LIDOCAINE HYDROCHLORIDE 100 MG: 20 INJECTION, SOLUTION INFILTRATION; PERINEURAL at 09:20

## 2019-11-01 RX ADMIN — METRONIDAZOLE 500 MG: 500 INJECTION, SOLUTION INTRAVENOUS at 11:03

## 2019-11-01 RX ADMIN — CEFAZOLIN 2000 MG: 1 INJECTION, POWDER, FOR SOLUTION INTRAMUSCULAR; INTRAVENOUS; PARENTERAL at 09:36

## 2019-11-01 RX ADMIN — ONDANSETRON 4 MG: 2 INJECTION INTRAMUSCULAR; INTRAVENOUS at 02:46

## 2019-11-01 RX ADMIN — SODIUM CHLORIDE, POTASSIUM CHLORIDE, SODIUM LACTATE AND CALCIUM CHLORIDE: 600; 310; 30; 20 INJECTION, SOLUTION INTRAVENOUS at 10:35

## 2019-11-01 RX ADMIN — CIPROFLOXACIN 400 MG: 2 INJECTION, SOLUTION INTRAVENOUS at 14:13

## 2019-11-01 RX ADMIN — KETOROLAC TROMETHAMINE 15 MG: 30 INJECTION, SOLUTION INTRAMUSCULAR at 19:53

## 2019-11-01 RX ADMIN — SODIUM CHLORIDE: 9 INJECTION, SOLUTION INTRAVENOUS at 22:46

## 2019-11-01 RX ADMIN — SODIUM CHLORIDE: 9 INJECTION, SOLUTION INTRAVENOUS at 06:40

## 2019-11-01 RX ADMIN — HYDROMORPHONE HYDROCHLORIDE 1 MG: 1 INJECTION, SOLUTION INTRAMUSCULAR; INTRAVENOUS; SUBCUTANEOUS at 02:43

## 2019-11-01 RX ADMIN — ROCURONIUM BROMIDE 50 MG: 10 INJECTION INTRAVENOUS at 09:20

## 2019-11-01 RX ADMIN — Medication 1 MG: at 02:43

## 2019-11-01 RX ADMIN — FENTANYL CITRATE 100 MCG: 50 INJECTION INTRAMUSCULAR; INTRAVENOUS at 09:20

## 2019-11-01 RX ADMIN — ONDANSETRON HYDROCHLORIDE 4 MG: 4 INJECTION, SOLUTION INTRAMUSCULAR; INTRAVENOUS at 11:20

## 2019-11-01 RX ADMIN — HYDROMORPHONE HYDROCHLORIDE 1 MG: 1 INJECTION, SOLUTION INTRAMUSCULAR; INTRAVENOUS; SUBCUTANEOUS at 02:21

## 2019-11-01 RX ADMIN — ROCURONIUM BROMIDE 20 MG: 10 INJECTION INTRAVENOUS at 10:00

## 2019-11-01 RX ADMIN — DEXAMETHASONE SODIUM PHOSPHATE 10 MG: 4 INJECTION, SOLUTION INTRAMUSCULAR; INTRAVENOUS at 09:32

## 2019-11-01 RX ADMIN — METRONIDAZOLE 500 MG: 500 INJECTION, SOLUTION INTRAVENOUS at 14:13

## 2019-11-01 RX ADMIN — DOCUSATE SODIUM 100 MG: 100 CAPSULE, LIQUID FILLED ORAL at 14:12

## 2019-11-01 RX ADMIN — HYDROMORPHONE HYDROCHLORIDE 0.5 MG: 1 INJECTION, SOLUTION INTRAMUSCULAR; INTRAVENOUS; SUBCUTANEOUS at 07:18

## 2019-11-01 RX ADMIN — BUSPIRONE HYDROCHLORIDE 10 MG: 10 TABLET ORAL at 14:12

## 2019-11-01 RX ADMIN — ESCITALOPRAM 20 MG: 20 TABLET, FILM COATED ORAL at 14:12

## 2019-11-01 RX ADMIN — FENTANYL CITRATE 50 MCG: 50 INJECTION INTRAMUSCULAR; INTRAVENOUS at 10:23

## 2019-11-01 RX ADMIN — SODIUM CHLORIDE: 9 INJECTION, SOLUTION INTRAVENOUS at 10:19

## 2019-11-01 RX ADMIN — HYDROMORPHONE HYDROCHLORIDE 1 MG: 1 INJECTION, SOLUTION INTRAMUSCULAR; INTRAVENOUS; SUBCUTANEOUS at 03:55

## 2019-11-01 RX ADMIN — METRONIDAZOLE 500 MG: 500 INJECTION, SOLUTION INTRAVENOUS at 22:41

## 2019-11-01 RX ADMIN — SODIUM CHLORIDE 1000 ML: 9 INJECTION, SOLUTION INTRAVENOUS at 03:55

## 2019-11-01 RX ADMIN — FENTANYL CITRATE 50 MCG: 50 INJECTION INTRAMUSCULAR; INTRAVENOUS at 12:22

## 2019-11-01 RX ADMIN — ONDANSETRON 4 MG: 2 INJECTION INTRAMUSCULAR; INTRAVENOUS at 02:24

## 2019-11-01 RX ADMIN — BUSPIRONE HYDROCHLORIDE 10 MG: 10 TABLET ORAL at 19:56

## 2019-11-01 RX ADMIN — ACETAMINOPHEN 975 MG: 325 SUPPOSITORY RECTAL at 09:33

## 2019-11-01 RX ADMIN — MONTELUKAST SODIUM 10 MG: 10 TABLET ORAL at 19:57

## 2019-11-01 RX ADMIN — Medication 0.6 MG: at 11:34

## 2019-11-01 RX ADMIN — CIPROFLOXACIN 400 MG: 2 INJECTION, SOLUTION INTRAVENOUS at 10:44

## 2019-11-01 RX ADMIN — KETOROLAC TROMETHAMINE 15 MG: 30 INJECTION, SOLUTION INTRAMUSCULAR at 14:12

## 2019-11-01 RX ADMIN — NEOSTIGMINE METHYLSULFATE 4 MG: 1 INJECTION, SOLUTION INTRAVENOUS at 11:34

## 2019-11-01 RX ADMIN — FENTANYL CITRATE 100 MCG: 50 INJECTION INTRAMUSCULAR; INTRAVENOUS at 09:58

## 2019-11-01 RX ADMIN — DOCUSATE SODIUM 100 MG: 100 CAPSULE, LIQUID FILLED ORAL at 22:41

## 2019-11-01 ASSESSMENT — PULMONARY FUNCTION TESTS
PIF_VALUE: 27
PIF_VALUE: 0
PIF_VALUE: 29
PIF_VALUE: 25
PIF_VALUE: 28
PIF_VALUE: 18
PIF_VALUE: 27
PIF_VALUE: 27
PIF_VALUE: 31
PIF_VALUE: 30
PIF_VALUE: 25
PIF_VALUE: 29
PIF_VALUE: 31
PIF_VALUE: 32
PIF_VALUE: 30
PIF_VALUE: 26
PIF_VALUE: 25
PIF_VALUE: 29
PIF_VALUE: 28
PIF_VALUE: 28
PIF_VALUE: 29
PIF_VALUE: 26
PIF_VALUE: 28
PIF_VALUE: 27
PIF_VALUE: 31
PIF_VALUE: 31
PIF_VALUE: 30
PIF_VALUE: 28
PIF_VALUE: 24
PIF_VALUE: 25
PIF_VALUE: 19
PIF_VALUE: 25
PIF_VALUE: 32
PIF_VALUE: 31
PIF_VALUE: 25
PIF_VALUE: 31
PIF_VALUE: 31
PIF_VALUE: 26
PIF_VALUE: 28
PIF_VALUE: 19
PIF_VALUE: 25
PIF_VALUE: 28
PIF_VALUE: 30
PIF_VALUE: 27
PIF_VALUE: 30
PIF_VALUE: 30
PIF_VALUE: 25
PIF_VALUE: 27
PIF_VALUE: 28
PIF_VALUE: 27
PIF_VALUE: 26
PIF_VALUE: 16
PIF_VALUE: 31
PIF_VALUE: 31
PIF_VALUE: 30
PIF_VALUE: 26
PIF_VALUE: 28
PIF_VALUE: 25
PIF_VALUE: 28
PIF_VALUE: 28
PIF_VALUE: 22
PIF_VALUE: 25
PIF_VALUE: 31
PIF_VALUE: 26
PIF_VALUE: 25
PIF_VALUE: 28
PIF_VALUE: 25
PIF_VALUE: 30
PIF_VALUE: 26
PIF_VALUE: 25
PIF_VALUE: 29
PIF_VALUE: 25
PIF_VALUE: 30
PIF_VALUE: 30
PIF_VALUE: 0
PIF_VALUE: 31
PIF_VALUE: 28
PIF_VALUE: 27
PIF_VALUE: 3
PIF_VALUE: 26
PIF_VALUE: 27
PIF_VALUE: 25
PIF_VALUE: 2
PIF_VALUE: 31
PIF_VALUE: 27
PIF_VALUE: 25
PIF_VALUE: 29
PIF_VALUE: 21
PIF_VALUE: 27
PIF_VALUE: 29
PIF_VALUE: 31
PIF_VALUE: 26
PIF_VALUE: 29
PIF_VALUE: 25
PIF_VALUE: 27
PIF_VALUE: 25
PIF_VALUE: 26
PIF_VALUE: 31
PIF_VALUE: 25
PIF_VALUE: 26
PIF_VALUE: 16
PIF_VALUE: 31
PIF_VALUE: 26
PIF_VALUE: 26
PIF_VALUE: 29
PIF_VALUE: 30
PIF_VALUE: 23
PIF_VALUE: 27
PIF_VALUE: 31
PIF_VALUE: 30
PIF_VALUE: 25
PIF_VALUE: 25
PIF_VALUE: 27
PIF_VALUE: 31
PIF_VALUE: 26
PIF_VALUE: 25
PIF_VALUE: 26
PIF_VALUE: 0
PIF_VALUE: 31
PIF_VALUE: 28
PIF_VALUE: 31
PIF_VALUE: 26
PIF_VALUE: 25
PIF_VALUE: 28
PIF_VALUE: 19
PIF_VALUE: 26
PIF_VALUE: 29
PIF_VALUE: 31
PIF_VALUE: 28
PIF_VALUE: 29
PIF_VALUE: 28
PIF_VALUE: 28
PIF_VALUE: 27
PIF_VALUE: 18
PIF_VALUE: 7
PIF_VALUE: 31
PIF_VALUE: 25
PIF_VALUE: 27
PIF_VALUE: 26
PIF_VALUE: 27
PIF_VALUE: 29
PIF_VALUE: 18
PIF_VALUE: 30
PIF_VALUE: 2
PIF_VALUE: 28
PIF_VALUE: 28
PIF_VALUE: 25
PIF_VALUE: 3
PIF_VALUE: 31

## 2019-11-01 ASSESSMENT — ENCOUNTER SYMPTOMS
NAUSEA: 1
PHOTOPHOBIA: 0
CHEST TIGHTNESS: 0
ABDOMINAL DISTENTION: 0
EYE ITCHING: 0
WHEEZING: 0
EYE REDNESS: 0
RHINORRHEA: 0
COUGH: 0
EYE PAIN: 0
ABDOMINAL PAIN: 1
SORE THROAT: 0
VOMITING: 1
CONSTIPATION: 0
STRIDOR: 0
EYE DISCHARGE: 0
BACK PAIN: 0
SHORTNESS OF BREATH: 0
DIARRHEA: 1

## 2019-11-01 ASSESSMENT — PAIN DESCRIPTION - LOCATION
LOCATION: ABDOMEN

## 2019-11-01 ASSESSMENT — PAIN DESCRIPTION - ORIENTATION
ORIENTATION: LOWER;LEFT
ORIENTATION: LEFT;LOWER
ORIENTATION: MID
ORIENTATION: LEFT;LOWER
ORIENTATION: LEFT;LOWER

## 2019-11-01 ASSESSMENT — PAIN SCALES - GENERAL
PAINLEVEL_OUTOF10: 10
PAINLEVEL_OUTOF10: 8
PAINLEVEL_OUTOF10: 8
PAINLEVEL_OUTOF10: 10
PAINLEVEL_OUTOF10: 10
PAINLEVEL_OUTOF10: 7
PAINLEVEL_OUTOF10: 9
PAINLEVEL_OUTOF10: 5
PAINLEVEL_OUTOF10: 4
PAINLEVEL_OUTOF10: 9
PAINLEVEL_OUTOF10: 10
PAINLEVEL_OUTOF10: 8

## 2019-11-01 ASSESSMENT — PAIN DESCRIPTION - FREQUENCY
FREQUENCY: INTERMITTENT
FREQUENCY: CONTINUOUS
FREQUENCY: INTERMITTENT
FREQUENCY: CONTINUOUS

## 2019-11-01 ASSESSMENT — PAIN DESCRIPTION - DESCRIPTORS
DESCRIPTORS: DISCOMFORT
DESCRIPTORS: SHARP
DESCRIPTORS: CRAMPING
DESCRIPTORS: CRAMPING;ACHING
DESCRIPTORS: DISCOMFORT
DESCRIPTORS: SHARP;SHOOTING

## 2019-11-01 ASSESSMENT — PAIN - FUNCTIONAL ASSESSMENT
PAIN_FUNCTIONAL_ASSESSMENT: ACTIVITIES ARE NOT PREVENTED

## 2019-11-01 ASSESSMENT — PAIN DESCRIPTION - PAIN TYPE
TYPE: ACUTE PAIN
TYPE: ACUTE PAIN
TYPE: SURGICAL PAIN
TYPE: ACUTE PAIN
TYPE: SURGICAL PAIN
TYPE: ACUTE PAIN
TYPE: ACUTE PAIN

## 2019-11-01 ASSESSMENT — PAIN DESCRIPTION - ONSET
ONSET: ON-GOING

## 2019-11-01 ASSESSMENT — PAIN DESCRIPTION - PROGRESSION
CLINICAL_PROGRESSION: NOT CHANGED
CLINICAL_PROGRESSION: NOT CHANGED
CLINICAL_PROGRESSION: GRADUALLY IMPROVING
CLINICAL_PROGRESSION: GRADUALLY WORSENING
CLINICAL_PROGRESSION: GRADUALLY WORSENING

## 2019-11-02 LAB
BASOPHILS # BLD: 0.2 %
BASOPHILS ABSOLUTE: 0 THOU/MM3 (ref 0–0.1)
EOSINOPHIL # BLD: 0 %
EOSINOPHILS ABSOLUTE: 0 THOU/MM3 (ref 0–0.4)
ERYTHROCYTE [DISTWIDTH] IN BLOOD BY AUTOMATED COUNT: 12.6 % (ref 11.5–14.5)
ERYTHROCYTE [DISTWIDTH] IN BLOOD BY AUTOMATED COUNT: 41.1 FL (ref 35–45)
HCT VFR BLD CALC: 33.6 % (ref 37–47)
HEMOGLOBIN: 11.7 GM/DL (ref 12–16)
IMMATURE GRANS (ABS): 0.07 THOU/MM3 (ref 0–0.07)
IMMATURE GRANULOCYTES: 0.6 %
LYMPHOCYTES # BLD: 6.8 %
LYMPHOCYTES ABSOLUTE: 0.8 THOU/MM3 (ref 1–4.8)
MCH RBC QN AUTO: 31.5 PG (ref 26–33)
MCHC RBC AUTO-ENTMCNC: 34.8 GM/DL (ref 32.2–35.5)
MCV RBC AUTO: 90.3 FL (ref 81–99)
MONOCYTES # BLD: 7.2 %
MONOCYTES ABSOLUTE: 0.9 THOU/MM3 (ref 0.4–1.3)
NUCLEATED RED BLOOD CELLS: 0 /100 WBC
PLATELET # BLD: 159 THOU/MM3 (ref 130–400)
PMV BLD AUTO: 9.3 FL (ref 9.4–12.4)
RBC # BLD: 3.72 MILL/MM3 (ref 4.2–5.4)
SEG NEUTROPHILS: 85.2 %
SEGMENTED NEUTROPHILS ABSOLUTE COUNT: 10.5 THOU/MM3 (ref 1.8–7.7)
WBC # BLD: 12.3 THOU/MM3 (ref 4.8–10.8)

## 2019-11-02 PROCEDURE — 85025 COMPLETE CBC W/AUTO DIFF WBC: CPT

## 2019-11-02 PROCEDURE — 99024 POSTOP FOLLOW-UP VISIT: CPT | Performed by: SURGERY

## 2019-11-02 PROCEDURE — 2580000003 HC RX 258: Performed by: OBSTETRICS & GYNECOLOGY

## 2019-11-02 PROCEDURE — 6370000000 HC RX 637 (ALT 250 FOR IP): Performed by: OBSTETRICS & GYNECOLOGY

## 2019-11-02 PROCEDURE — 6360000002 HC RX W HCPCS: Performed by: OBSTETRICS & GYNECOLOGY

## 2019-11-02 PROCEDURE — 1200000000 HC SEMI PRIVATE

## 2019-11-02 PROCEDURE — 6370000000 HC RX 637 (ALT 250 FOR IP): Performed by: SURGERY

## 2019-11-02 PROCEDURE — 2500000003 HC RX 250 WO HCPCS: Performed by: OBSTETRICS & GYNECOLOGY

## 2019-11-02 PROCEDURE — 36415 COLL VENOUS BLD VENIPUNCTURE: CPT

## 2019-11-02 RX ORDER — HYDROCORTISONE ACETATE 25 MG/1
25 SUPPOSITORY RECTAL 2 TIMES DAILY
Status: DISCONTINUED | OUTPATIENT
Start: 2019-11-02 | End: 2019-11-05 | Stop reason: HOSPADM

## 2019-11-02 RX ORDER — BISACODYL 10 MG
10 SUPPOSITORY, RECTAL RECTAL 2 TIMES DAILY
Status: DISCONTINUED | OUTPATIENT
Start: 2019-11-02 | End: 2019-11-05 | Stop reason: HOSPADM

## 2019-11-02 RX ADMIN — HYDROCHLOROTHIAZIDE 25 MG: 25 TABLET ORAL at 08:07

## 2019-11-02 RX ADMIN — MONTELUKAST SODIUM 10 MG: 10 TABLET ORAL at 23:01

## 2019-11-02 RX ADMIN — SODIUM CHLORIDE: 9 INJECTION, SOLUTION INTRAVENOUS at 08:08

## 2019-11-02 RX ADMIN — KETOROLAC TROMETHAMINE 15 MG: 30 INJECTION, SOLUTION INTRAMUSCULAR at 08:07

## 2019-11-02 RX ADMIN — BUSPIRONE HYDROCHLORIDE 10 MG: 10 TABLET ORAL at 08:07

## 2019-11-02 RX ADMIN — BISACODYL 10 MG: 10 SUPPOSITORY RECTAL at 10:42

## 2019-11-02 RX ADMIN — CIPROFLOXACIN 400 MG: 2 INJECTION, SOLUTION INTRAVENOUS at 01:43

## 2019-11-02 RX ADMIN — BISACODYL 10 MG: 10 SUPPOSITORY RECTAL at 20:42

## 2019-11-02 RX ADMIN — KETOROLAC TROMETHAMINE 15 MG: 30 INJECTION, SOLUTION INTRAMUSCULAR at 20:41

## 2019-11-02 RX ADMIN — KETOROLAC TROMETHAMINE 15 MG: 30 INJECTION, SOLUTION INTRAMUSCULAR at 01:41

## 2019-11-02 RX ADMIN — HYDROCORTISONE ACETATE 25 MG: 25 SUPPOSITORY RECTAL at 13:48

## 2019-11-02 RX ADMIN — KETOROLAC TROMETHAMINE 15 MG: 30 INJECTION, SOLUTION INTRAMUSCULAR at 13:49

## 2019-11-02 RX ADMIN — DOCUSATE SODIUM 100 MG: 100 CAPSULE, LIQUID FILLED ORAL at 08:07

## 2019-11-02 RX ADMIN — ACETAMINOPHEN 650 MG: 325 TABLET ORAL at 05:33

## 2019-11-02 RX ADMIN — CIPROFLOXACIN 400 MG: 2 INJECTION, SOLUTION INTRAVENOUS at 14:30

## 2019-11-02 RX ADMIN — MORPHINE SULFATE 2 MG: 2 INJECTION, SOLUTION INTRAMUSCULAR; INTRAVENOUS at 14:32

## 2019-11-02 RX ADMIN — BUSPIRONE HYDROCHLORIDE 10 MG: 10 TABLET ORAL at 20:42

## 2019-11-02 RX ADMIN — SODIUM CHLORIDE: 9 INJECTION, SOLUTION INTRAVENOUS at 21:23

## 2019-11-02 RX ADMIN — METRONIDAZOLE 500 MG: 500 INJECTION, SOLUTION INTRAVENOUS at 23:02

## 2019-11-02 RX ADMIN — METRONIDAZOLE 500 MG: 500 INJECTION, SOLUTION INTRAVENOUS at 14:30

## 2019-11-02 RX ADMIN — METRONIDAZOLE 500 MG: 500 INJECTION, SOLUTION INTRAVENOUS at 05:35

## 2019-11-02 RX ADMIN — ACETAMINOPHEN 650 MG: 325 TABLET ORAL at 10:46

## 2019-11-02 RX ADMIN — ESCITALOPRAM 20 MG: 20 TABLET, FILM COATED ORAL at 08:07

## 2019-11-02 RX ADMIN — HYDROCORTISONE ACETATE 25 MG: 25 SUPPOSITORY RECTAL at 20:42

## 2019-11-02 RX ADMIN — DOCUSATE SODIUM 100 MG: 100 CAPSULE, LIQUID FILLED ORAL at 20:42

## 2019-11-02 ASSESSMENT — PAIN DESCRIPTION - LOCATION
LOCATION: ABDOMEN

## 2019-11-02 ASSESSMENT — PAIN DESCRIPTION - PAIN TYPE
TYPE: SURGICAL PAIN

## 2019-11-02 ASSESSMENT — PAIN - FUNCTIONAL ASSESSMENT
PAIN_FUNCTIONAL_ASSESSMENT: PREVENTS OR INTERFERES SOME ACTIVE ACTIVITIES AND ADLS
PAIN_FUNCTIONAL_ASSESSMENT: ACTIVITIES ARE NOT PREVENTED
PAIN_FUNCTIONAL_ASSESSMENT: PREVENTS OR INTERFERES SOME ACTIVE ACTIVITIES AND ADLS
PAIN_FUNCTIONAL_ASSESSMENT: ACTIVITIES ARE NOT PREVENTED

## 2019-11-02 ASSESSMENT — PAIN DESCRIPTION - DESCRIPTORS
DESCRIPTORS: DISCOMFORT
DESCRIPTORS: DISCOMFORT
DESCRIPTORS: CRAMPING;DISCOMFORT
DESCRIPTORS: DISCOMFORT
DESCRIPTORS: CRAMPING;DISCOMFORT

## 2019-11-02 ASSESSMENT — PAIN DESCRIPTION - PROGRESSION
CLINICAL_PROGRESSION: GRADUALLY IMPROVING
CLINICAL_PROGRESSION: NOT CHANGED
CLINICAL_PROGRESSION: GRADUALLY IMPROVING
CLINICAL_PROGRESSION: GRADUALLY WORSENING
CLINICAL_PROGRESSION: GRADUALLY IMPROVING
CLINICAL_PROGRESSION: NOT CHANGED
CLINICAL_PROGRESSION: GRADUALLY IMPROVING

## 2019-11-02 ASSESSMENT — PAIN SCALES - GENERAL
PAINLEVEL_OUTOF10: 3
PAINLEVEL_OUTOF10: 9
PAINLEVEL_OUTOF10: 5
PAINLEVEL_OUTOF10: 2
PAINLEVEL_OUTOF10: 9
PAINLEVEL_OUTOF10: 5
PAINLEVEL_OUTOF10: 3
PAINLEVEL_OUTOF10: 7

## 2019-11-02 ASSESSMENT — PAIN DESCRIPTION - ORIENTATION
ORIENTATION: LOWER;MID
ORIENTATION: MID;RIGHT
ORIENTATION: MID;LOWER
ORIENTATION: LOWER;MID
ORIENTATION: LOWER;RIGHT

## 2019-11-02 ASSESSMENT — PAIN DESCRIPTION - ONSET
ONSET: ON-GOING

## 2019-11-02 ASSESSMENT — PAIN DESCRIPTION - FREQUENCY
FREQUENCY: INTERMITTENT

## 2019-11-03 LAB
BASOPHILS # BLD: 0.2 %
BASOPHILS ABSOLUTE: 0 THOU/MM3 (ref 0–0.1)
EOSINOPHIL # BLD: 0.5 %
EOSINOPHILS ABSOLUTE: 0.1 THOU/MM3 (ref 0–0.4)
ERYTHROCYTE [DISTWIDTH] IN BLOOD BY AUTOMATED COUNT: 12.8 % (ref 11.5–14.5)
ERYTHROCYTE [DISTWIDTH] IN BLOOD BY AUTOMATED COUNT: 41.3 FL (ref 35–45)
HCT VFR BLD CALC: 30.3 % (ref 37–47)
HEMOGLOBIN: 10.7 GM/DL (ref 12–16)
IMMATURE GRANS (ABS): 0.09 THOU/MM3 (ref 0–0.07)
IMMATURE GRANULOCYTES: 0.9 %
LYMPHOCYTES # BLD: 12.8 %
LYMPHOCYTES ABSOLUTE: 1.3 THOU/MM3 (ref 1–4.8)
MCH RBC QN AUTO: 31.2 PG (ref 26–33)
MCHC RBC AUTO-ENTMCNC: 35.3 GM/DL (ref 32.2–35.5)
MCV RBC AUTO: 88.3 FL (ref 81–99)
MONOCYTES # BLD: 5.6 %
MONOCYTES ABSOLUTE: 0.6 THOU/MM3 (ref 0.4–1.3)
NUCLEATED RED BLOOD CELLS: 0 /100 WBC
PLATELET # BLD: 181 THOU/MM3 (ref 130–400)
PMV BLD AUTO: 9.3 FL (ref 9.4–12.4)
RBC # BLD: 3.43 MILL/MM3 (ref 4.2–5.4)
SEG NEUTROPHILS: 80 %
SEGMENTED NEUTROPHILS ABSOLUTE COUNT: 8.2 THOU/MM3 (ref 1.8–7.7)
WBC # BLD: 10.2 THOU/MM3 (ref 4.8–10.8)

## 2019-11-03 PROCEDURE — 6370000000 HC RX 637 (ALT 250 FOR IP): Performed by: OBSTETRICS & GYNECOLOGY

## 2019-11-03 PROCEDURE — 99024 POSTOP FOLLOW-UP VISIT: CPT | Performed by: SURGERY

## 2019-11-03 PROCEDURE — 2709999900 HC NON-CHARGEABLE SUPPLY

## 2019-11-03 PROCEDURE — 85025 COMPLETE CBC W/AUTO DIFF WBC: CPT

## 2019-11-03 PROCEDURE — 6370000000 HC RX 637 (ALT 250 FOR IP): Performed by: SURGERY

## 2019-11-03 PROCEDURE — 2580000003 HC RX 258: Performed by: OBSTETRICS & GYNECOLOGY

## 2019-11-03 PROCEDURE — 36415 COLL VENOUS BLD VENIPUNCTURE: CPT

## 2019-11-03 PROCEDURE — 6360000002 HC RX W HCPCS: Performed by: OBSTETRICS & GYNECOLOGY

## 2019-11-03 PROCEDURE — 1200000000 HC SEMI PRIVATE

## 2019-11-03 PROCEDURE — 2500000003 HC RX 250 WO HCPCS: Performed by: OBSTETRICS & GYNECOLOGY

## 2019-11-03 RX ORDER — SUCRALFATE 1 G/1
1 TABLET ORAL EVERY 6 HOURS SCHEDULED
Status: DISCONTINUED | OUTPATIENT
Start: 2019-11-03 | End: 2019-11-05 | Stop reason: HOSPADM

## 2019-11-03 RX ADMIN — METRONIDAZOLE 500 MG: 500 INJECTION, SOLUTION INTRAVENOUS at 16:20

## 2019-11-03 RX ADMIN — METRONIDAZOLE 500 MG: 500 INJECTION, SOLUTION INTRAVENOUS at 22:31

## 2019-11-03 RX ADMIN — ESCITALOPRAM 20 MG: 20 TABLET, FILM COATED ORAL at 09:08

## 2019-11-03 RX ADMIN — Medication 10 ML: at 09:08

## 2019-11-03 RX ADMIN — METRONIDAZOLE 500 MG: 500 INJECTION, SOLUTION INTRAVENOUS at 06:06

## 2019-11-03 RX ADMIN — HYDROCHLOROTHIAZIDE 25 MG: 25 TABLET ORAL at 09:07

## 2019-11-03 RX ADMIN — BUSPIRONE HYDROCHLORIDE 10 MG: 10 TABLET ORAL at 21:15

## 2019-11-03 RX ADMIN — MONTELUKAST SODIUM 10 MG: 10 TABLET ORAL at 21:15

## 2019-11-03 RX ADMIN — DOCUSATE SODIUM 100 MG: 100 CAPSULE, LIQUID FILLED ORAL at 09:06

## 2019-11-03 RX ADMIN — SUCRALFATE 1 G: 1 TABLET ORAL at 14:55

## 2019-11-03 RX ADMIN — SUCRALFATE 1 G: 1 TABLET ORAL at 18:07

## 2019-11-03 RX ADMIN — CIPROFLOXACIN 400 MG: 2 INJECTION, SOLUTION INTRAVENOUS at 14:53

## 2019-11-03 RX ADMIN — SODIUM CHLORIDE: 9 INJECTION, SOLUTION INTRAVENOUS at 06:08

## 2019-11-03 RX ADMIN — BUSPIRONE HYDROCHLORIDE 10 MG: 10 TABLET ORAL at 09:08

## 2019-11-03 RX ADMIN — HYDROCODONE BITARTRATE AND ACETAMINOPHEN 1 TABLET: 5; 325 TABLET ORAL at 21:15

## 2019-11-03 RX ADMIN — CIPROFLOXACIN 400 MG: 2 INJECTION, SOLUTION INTRAVENOUS at 01:17

## 2019-11-03 RX ADMIN — FLUTICASONE PROPIONATE 2 SPRAY: 50 SPRAY, METERED NASAL at 09:08

## 2019-11-03 RX ADMIN — KETOROLAC TROMETHAMINE 15 MG: 30 INJECTION, SOLUTION INTRAMUSCULAR at 01:23

## 2019-11-03 ASSESSMENT — PAIN SCALES - GENERAL
PAINLEVEL_OUTOF10: 0
PAINLEVEL_OUTOF10: 0
PAINLEVEL_OUTOF10: 6
PAINLEVEL_OUTOF10: 0
PAINLEVEL_OUTOF10: 5
PAINLEVEL_OUTOF10: 0
PAINLEVEL_OUTOF10: 7
PAINLEVEL_OUTOF10: 0

## 2019-11-03 ASSESSMENT — PAIN DESCRIPTION - FREQUENCY
FREQUENCY: INTERMITTENT
FREQUENCY: INTERMITTENT

## 2019-11-03 ASSESSMENT — PAIN DESCRIPTION - PROGRESSION
CLINICAL_PROGRESSION: GRADUALLY IMPROVING
CLINICAL_PROGRESSION: GRADUALLY IMPROVING

## 2019-11-03 ASSESSMENT — PAIN DESCRIPTION - ORIENTATION
ORIENTATION: MID;RIGHT
ORIENTATION: MID;RIGHT

## 2019-11-03 ASSESSMENT — PAIN DESCRIPTION - LOCATION
LOCATION: ABDOMEN
LOCATION: ABDOMEN

## 2019-11-03 ASSESSMENT — PAIN DESCRIPTION - PAIN TYPE
TYPE: SURGICAL PAIN
TYPE: SURGICAL PAIN

## 2019-11-03 ASSESSMENT — PAIN DESCRIPTION - DESCRIPTORS
DESCRIPTORS: ACHING
DESCRIPTORS: CRAMPING;DISCOMFORT

## 2019-11-03 ASSESSMENT — PAIN - FUNCTIONAL ASSESSMENT
PAIN_FUNCTIONAL_ASSESSMENT: PREVENTS OR INTERFERES SOME ACTIVE ACTIVITIES AND ADLS
PAIN_FUNCTIONAL_ASSESSMENT: PREVENTS OR INTERFERES SOME ACTIVE ACTIVITIES AND ADLS

## 2019-11-03 ASSESSMENT — PAIN DESCRIPTION - ONSET
ONSET: ON-GOING
ONSET: ON-GOING

## 2019-11-04 LAB
AEROBIC CULTURE: ABNORMAL
AEROBIC CULTURE: ABNORMAL
ANAEROBIC CULTURE: ABNORMAL
BASOPHILS # BLD: 0.5 %
BASOPHILS ABSOLUTE: 0 THOU/MM3 (ref 0–0.1)
EOSINOPHIL # BLD: 0.4 %
EOSINOPHILS ABSOLUTE: 0 THOU/MM3 (ref 0–0.4)
ERYTHROCYTE [DISTWIDTH] IN BLOOD BY AUTOMATED COUNT: 13 % (ref 11.5–14.5)
ERYTHROCYTE [DISTWIDTH] IN BLOOD BY AUTOMATED COUNT: 42.6 FL (ref 35–45)
GRAM STAIN RESULT: ABNORMAL
HCT VFR BLD CALC: 33.6 % (ref 37–47)
HEMOGLOBIN: 11.5 GM/DL (ref 12–16)
IMMATURE GRANS (ABS): 0.15 THOU/MM3 (ref 0–0.07)
IMMATURE GRANULOCYTES: 1.9 %
LYMPHOCYTES # BLD: 31.7 %
LYMPHOCYTES ABSOLUTE: 2.5 THOU/MM3 (ref 1–4.8)
MCH RBC QN AUTO: 31.2 PG (ref 26–33)
MCHC RBC AUTO-ENTMCNC: 34.2 GM/DL (ref 32.2–35.5)
MCV RBC AUTO: 91.1 FL (ref 81–99)
MONOCYTES # BLD: 7 %
MONOCYTES ABSOLUTE: 0.6 THOU/MM3 (ref 0.4–1.3)
NUCLEATED RED BLOOD CELLS: 0 /100 WBC
ORGANISM: ABNORMAL
ORGANISM: ABNORMAL
PLATELET # BLD: 222 THOU/MM3 (ref 130–400)
PMV BLD AUTO: 8.9 FL (ref 9.4–12.4)
RBC # BLD: 3.69 MILL/MM3 (ref 4.2–5.4)
SEG NEUTROPHILS: 58.5 %
SEGMENTED NEUTROPHILS ABSOLUTE COUNT: 4.7 THOU/MM3 (ref 1.8–7.7)
WBC # BLD: 8 THOU/MM3 (ref 4.8–10.8)

## 2019-11-04 PROCEDURE — 6370000000 HC RX 637 (ALT 250 FOR IP): Performed by: OBSTETRICS & GYNECOLOGY

## 2019-11-04 PROCEDURE — 1200000000 HC SEMI PRIVATE

## 2019-11-04 PROCEDURE — 2500000003 HC RX 250 WO HCPCS: Performed by: OBSTETRICS & GYNECOLOGY

## 2019-11-04 PROCEDURE — 82272 OCCULT BLD FECES 1-3 TESTS: CPT

## 2019-11-04 PROCEDURE — 2580000003 HC RX 258: Performed by: OBSTETRICS & GYNECOLOGY

## 2019-11-04 PROCEDURE — 6360000002 HC RX W HCPCS: Performed by: OBSTETRICS & GYNECOLOGY

## 2019-11-04 PROCEDURE — 6370000000 HC RX 637 (ALT 250 FOR IP): Performed by: SURGERY

## 2019-11-04 PROCEDURE — 2709999900 HC NON-CHARGEABLE SUPPLY

## 2019-11-04 PROCEDURE — 36415 COLL VENOUS BLD VENIPUNCTURE: CPT

## 2019-11-04 PROCEDURE — 85025 COMPLETE CBC W/AUTO DIFF WBC: CPT

## 2019-11-04 PROCEDURE — 99024 POSTOP FOLLOW-UP VISIT: CPT | Performed by: SURGERY

## 2019-11-04 RX ORDER — OXYCODONE HYDROCHLORIDE AND ACETAMINOPHEN 5; 325 MG/1; MG/1
1 TABLET ORAL EVERY 4 HOURS PRN
Status: DISCONTINUED | OUTPATIENT
Start: 2019-11-04 | End: 2019-11-05 | Stop reason: HOSPADM

## 2019-11-04 RX ORDER — OXYCODONE HYDROCHLORIDE AND ACETAMINOPHEN 5; 325 MG/1; MG/1
2 TABLET ORAL EVERY 4 HOURS PRN
Status: DISCONTINUED | OUTPATIENT
Start: 2019-11-04 | End: 2019-11-05 | Stop reason: HOSPADM

## 2019-11-04 RX ADMIN — CIPROFLOXACIN 400 MG: 2 INJECTION, SOLUTION INTRAVENOUS at 04:00

## 2019-11-04 RX ADMIN — DOCUSATE SODIUM 100 MG: 100 CAPSULE, LIQUID FILLED ORAL at 22:36

## 2019-11-04 RX ADMIN — OXYCODONE HYDROCHLORIDE AND ACETAMINOPHEN 1 TABLET: 5; 325 TABLET ORAL at 10:23

## 2019-11-04 RX ADMIN — CEFOXITIN SODIUM 2 G: 2 POWDER, FOR SOLUTION INTRAVENOUS at 22:43

## 2019-11-04 RX ADMIN — METRONIDAZOLE 500 MG: 500 INJECTION, SOLUTION INTRAVENOUS at 15:12

## 2019-11-04 RX ADMIN — Medication 10 ML: at 22:36

## 2019-11-04 RX ADMIN — BUSPIRONE HYDROCHLORIDE 10 MG: 10 TABLET ORAL at 22:35

## 2019-11-04 RX ADMIN — SUCRALFATE 1 G: 1 TABLET ORAL at 22:35

## 2019-11-04 RX ADMIN — SUCRALFATE 1 G: 1 TABLET ORAL at 00:14

## 2019-11-04 RX ADMIN — CEFOXITIN SODIUM 2 G: 2 POWDER, FOR SOLUTION INTRAVENOUS at 14:44

## 2019-11-04 RX ADMIN — HYDROCHLOROTHIAZIDE 25 MG: 25 TABLET ORAL at 10:22

## 2019-11-04 RX ADMIN — SUCRALFATE 1 G: 1 TABLET ORAL at 10:22

## 2019-11-04 RX ADMIN — BUSPIRONE HYDROCHLORIDE 10 MG: 10 TABLET ORAL at 10:22

## 2019-11-04 RX ADMIN — METRONIDAZOLE 500 MG: 500 INJECTION, SOLUTION INTRAVENOUS at 23:24

## 2019-11-04 RX ADMIN — METRONIDAZOLE 500 MG: 500 INJECTION, SOLUTION INTRAVENOUS at 05:58

## 2019-11-04 RX ADMIN — MONTELUKAST SODIUM 10 MG: 10 TABLET ORAL at 22:35

## 2019-11-04 RX ADMIN — ESCITALOPRAM 20 MG: 20 TABLET, FILM COATED ORAL at 10:22

## 2019-11-04 RX ADMIN — ONDANSETRON 4 MG: 2 INJECTION INTRAMUSCULAR; INTRAVENOUS at 04:37

## 2019-11-04 RX ADMIN — SUCRALFATE 1 G: 1 TABLET ORAL at 05:58

## 2019-11-04 ASSESSMENT — PAIN DESCRIPTION - ONSET: ONSET: ON-GOING

## 2019-11-04 ASSESSMENT — PAIN SCALES - GENERAL
PAINLEVEL_OUTOF10: 8
PAINLEVEL_OUTOF10: 0
PAINLEVEL_OUTOF10: 0
PAINLEVEL_OUTOF10: 6
PAINLEVEL_OUTOF10: 0
PAINLEVEL_OUTOF10: 7

## 2019-11-04 ASSESSMENT — PAIN DESCRIPTION - PROGRESSION: CLINICAL_PROGRESSION: GRADUALLY IMPROVING

## 2019-11-04 ASSESSMENT — PAIN DESCRIPTION - LOCATION
LOCATION: ABDOMEN
LOCATION: ABDOMEN

## 2019-11-04 ASSESSMENT — PAIN - FUNCTIONAL ASSESSMENT: PAIN_FUNCTIONAL_ASSESSMENT: PREVENTS OR INTERFERES SOME ACTIVE ACTIVITIES AND ADLS

## 2019-11-04 ASSESSMENT — PAIN DESCRIPTION - FREQUENCY: FREQUENCY: INTERMITTENT

## 2019-11-04 ASSESSMENT — PAIN DESCRIPTION - PAIN TYPE
TYPE: SURGICAL PAIN
TYPE: SURGICAL PAIN

## 2019-11-04 ASSESSMENT — PAIN DESCRIPTION - DESCRIPTORS: DESCRIPTORS: ACHING

## 2019-11-04 ASSESSMENT — PAIN DESCRIPTION - ORIENTATION
ORIENTATION: MID;RIGHT
ORIENTATION: RIGHT;MID

## 2019-11-05 VITALS
BODY MASS INDEX: 37.65 KG/M2 | HEIGHT: 61 IN | TEMPERATURE: 98.5 F | DIASTOLIC BLOOD PRESSURE: 71 MMHG | RESPIRATION RATE: 14 BRPM | HEART RATE: 76 BPM | WEIGHT: 199.4 LBS | SYSTOLIC BLOOD PRESSURE: 146 MMHG | OXYGEN SATURATION: 95 %

## 2019-11-05 LAB — HEMOCCULT STL QL: NEGATIVE

## 2019-11-05 PROCEDURE — 6370000000 HC RX 637 (ALT 250 FOR IP): Performed by: OBSTETRICS & GYNECOLOGY

## 2019-11-05 PROCEDURE — 2500000003 HC RX 250 WO HCPCS: Performed by: OBSTETRICS & GYNECOLOGY

## 2019-11-05 PROCEDURE — 6360000002 HC RX W HCPCS: Performed by: OBSTETRICS & GYNECOLOGY

## 2019-11-05 PROCEDURE — 2580000003 HC RX 258: Performed by: OBSTETRICS & GYNECOLOGY

## 2019-11-05 PROCEDURE — 2709999900 HC NON-CHARGEABLE SUPPLY

## 2019-11-05 PROCEDURE — 6370000000 HC RX 637 (ALT 250 FOR IP): Performed by: SURGERY

## 2019-11-05 RX ORDER — OXYCODONE HYDROCHLORIDE AND ACETAMINOPHEN 5; 325 MG/1; MG/1
1 TABLET ORAL EVERY 6 HOURS PRN
Qty: 28 TABLET | Refills: 0 | Status: SHIPPED | OUTPATIENT
Start: 2019-11-05 | End: 2019-11-12

## 2019-11-05 RX ORDER — SULFAMETHOXAZOLE AND TRIMETHOPRIM 800; 160 MG/1; MG/1
1 TABLET ORAL 2 TIMES DAILY
Qty: 18 TABLET | Refills: 0 | Status: SHIPPED | OUTPATIENT
Start: 2019-11-05 | End: 2019-11-14

## 2019-11-05 RX ORDER — IBUPROFEN 600 MG/1
600 TABLET ORAL EVERY 6 HOURS PRN
Qty: 30 TABLET | Refills: 1 | COMMUNITY
Start: 2019-11-05 | End: 2019-11-22

## 2019-11-05 RX ORDER — METRONIDAZOLE 500 MG/1
500 TABLET ORAL 2 TIMES DAILY
Qty: 18 TABLET | Refills: 0 | Status: SHIPPED | OUTPATIENT
Start: 2019-11-05 | End: 2019-11-14

## 2019-11-05 RX ADMIN — METRONIDAZOLE 500 MG: 500 INJECTION, SOLUTION INTRAVENOUS at 06:18

## 2019-11-05 RX ADMIN — CEFOXITIN SODIUM 2 G: 2 POWDER, FOR SOLUTION INTRAVENOUS at 05:46

## 2019-11-05 RX ADMIN — DOCUSATE SODIUM 100 MG: 100 CAPSULE, LIQUID FILLED ORAL at 10:09

## 2019-11-05 RX ADMIN — SUCRALFATE 1 G: 1 TABLET ORAL at 00:36

## 2019-11-05 RX ADMIN — SUCRALFATE 1 G: 1 TABLET ORAL at 06:18

## 2019-11-05 RX ADMIN — CEFOXITIN SODIUM 2 G: 2 POWDER, FOR SOLUTION INTRAVENOUS at 00:30

## 2019-11-05 RX ADMIN — ESCITALOPRAM 20 MG: 20 TABLET, FILM COATED ORAL at 10:09

## 2019-11-05 RX ADMIN — BUSPIRONE HYDROCHLORIDE 10 MG: 10 TABLET ORAL at 10:09

## 2019-11-05 RX ADMIN — Medication 10 ML: at 09:59

## 2019-11-05 ASSESSMENT — PAIN SCALES - GENERAL
PAINLEVEL_OUTOF10: 0
PAINLEVEL_OUTOF10: 0

## 2019-11-22 ENCOUNTER — OFFICE VISIT (OUTPATIENT)
Dept: SURGERY | Age: 54
End: 2019-11-22

## 2019-11-22 VITALS
SYSTOLIC BLOOD PRESSURE: 126 MMHG | DIASTOLIC BLOOD PRESSURE: 80 MMHG | WEIGHT: 196 LBS | HEART RATE: 89 BPM | BODY MASS INDEX: 37.03 KG/M2 | TEMPERATURE: 98.3 F | OXYGEN SATURATION: 99 % | RESPIRATION RATE: 20 BRPM

## 2019-11-22 DIAGNOSIS — R10.32 ABDOMINAL PAIN, LEFT LOWER QUADRANT: Primary | ICD-10-CM

## 2019-11-22 PROCEDURE — 99024 POSTOP FOLLOW-UP VISIT: CPT | Performed by: SURGERY

## 2022-03-31 ENCOUNTER — HOSPITAL ENCOUNTER (OUTPATIENT)
Dept: MRI IMAGING | Age: 57
Discharge: HOME | End: 2022-03-31
Payer: COMMERCIAL

## 2022-03-31 ENCOUNTER — HOSPITAL ENCOUNTER (OUTPATIENT)
Age: 57
Discharge: HOME | End: 2022-03-31
Payer: COMMERCIAL

## 2022-03-31 DIAGNOSIS — R26.9 ABNORMALITY OF GAIT: ICD-10-CM

## 2022-03-31 DIAGNOSIS — R20.0 FACIAL NUMBNESS: ICD-10-CM

## 2022-03-31 LAB
BASOPHILS # BLD: 0.9 % (ref 0–3)
EOSINOPHILS RELATIVE PERCENT: 1.7 % (ref 0–4)
HCT VFR BLD CALC: 44.4 % (ref 37–47)
HEMOGLOBIN: 15 GM/DL (ref 12–16)
LYMPHOCYTES # BLD: 28.2 % (ref 15–47)
MCH RBC QN AUTO: 31.6 PG (ref 27–31)
MCHC RBC AUTO-ENTMCNC: 33.7 GM/DL (ref 33–37)
MCV RBC AUTO: 93.9 FL (ref 81–99)
MONOCYTES: 6.7 % (ref 0–12)
PDW BLD-RTO: 14.3 % (ref 11.5–14.5)
PLATELET # BLD: 200 THOU/MM3 (ref 130–400)
PMV BLD AUTO: 7 FL (ref 7.4–10.4)
RBC # BLD: 4.73 MILL/MM3 (ref 4.2–5.4)
SEGS: 62.5 % (ref 43–75)
WBC # BLD: 8.1 THOU/MM3 (ref 4.8–10.8)

## 2022-03-31 PROCEDURE — 80053 COMPREHEN METABOLIC PANEL: CPT

## 2022-03-31 PROCEDURE — A9579 GAD-BASE MR CONTRAST NOS,1ML: HCPCS | Performed by: FAMILY MEDICINE

## 2022-03-31 PROCEDURE — 83036 HEMOGLOBIN GLYCOSYLATED A1C: CPT

## 2022-03-31 PROCEDURE — 84443 ASSAY THYROID STIM HORMONE: CPT

## 2022-03-31 PROCEDURE — 85025 COMPLETE CBC W/AUTO DIFF WBC: CPT

## 2022-03-31 PROCEDURE — 6360000004 HC RX CONTRAST MEDICATION: Performed by: FAMILY MEDICINE

## 2022-03-31 PROCEDURE — 70553 MRI BRAIN STEM W/O & W/DYE: CPT

## 2022-03-31 RX ADMIN — GADOTERIDOL 20 ML: 279.3 INJECTION, SOLUTION INTRAVENOUS at 13:34

## 2022-04-01 LAB
ALBUMIN SERPL-MCNC: 4.4 G/DL (ref 3.5–5.1)
ALP BLD-CCNC: 140 U/L (ref 38–126)
ALT SERPL-CCNC: 60 U/L (ref 11–66)
ANION GAP SERPL CALCULATED.3IONS-SCNC: 11 MEQ/L (ref 8–16)
AST SERPL-CCNC: 51 U/L (ref 5–40)
AVERAGE GLUCOSE: 132 MG/DL (ref 70–126)
BILIRUB SERPL-MCNC: 0.5 MG/DL (ref 0.3–1.2)
BUN BLDV-MCNC: 13 MG/DL (ref 7–22)
CALCIUM SERPL-MCNC: 9.6 MG/DL (ref 8.5–10.5)
CHLORIDE BLD-SCNC: 101 MEQ/L (ref 98–111)
CO2: 29 MEQ/L (ref 23–33)
CREAT SERPL-MCNC: 0.7 MG/DL (ref 0.4–1.2)
GFR SERPL CREATININE-BSD FRML MDRD: 86 ML/MIN/1.73M2
GLUCOSE BLD-MCNC: 186 MG/DL (ref 70–108)
HBA1C MFR BLD: 6.4 % (ref 4.4–6.4)
POTASSIUM SERPL-SCNC: 4.3 MEQ/L (ref 3.5–5.2)
SODIUM BLD-SCNC: 141 MEQ/L (ref 135–145)
TOTAL PROTEIN: 7.6 G/DL (ref 6.1–8)
TSH SERPL DL<=0.05 MIU/L-ACNC: 0.96 UIU/ML (ref 0.4–4.2)

## 2022-04-15 ENCOUNTER — HOSPITAL ENCOUNTER (OUTPATIENT)
Dept: INTERVENTIONAL RADIOLOGY/VASCULAR | Age: 57
Discharge: HOME | End: 2022-04-15
Payer: COMMERCIAL

## 2022-04-15 ENCOUNTER — HOSPITAL ENCOUNTER (OUTPATIENT)
Dept: NUCLEAR MEDICINE | Age: 57
Discharge: HOME | End: 2022-04-15
Payer: COMMERCIAL

## 2022-04-15 ENCOUNTER — HOSPITAL ENCOUNTER (OUTPATIENT)
Dept: NON INVASIVE DIAGNOSTICS | Age: 57
Discharge: HOME | End: 2022-04-15
Payer: COMMERCIAL

## 2022-04-15 DIAGNOSIS — R74.8 ELEVATED ALKALINE PHOSPHATASE LEVEL: ICD-10-CM

## 2022-04-15 DIAGNOSIS — R93.7 ABNORMAL MRI SCAN, BONE: ICD-10-CM

## 2022-04-15 DIAGNOSIS — G45.9 TIA (TRANSIENT ISCHEMIC ATTACK): ICD-10-CM

## 2022-04-15 LAB
LV EF: 60 %
LVEF MODALITY: NORMAL

## 2022-04-15 PROCEDURE — A9503 TC99M MEDRONATE: HCPCS | Performed by: FAMILY MEDICINE

## 2022-04-15 PROCEDURE — 93306 TTE W/DOPPLER COMPLETE: CPT

## 2022-04-15 PROCEDURE — 3430000000 HC RX DIAGNOSTIC RADIOPHARMACEUTICAL: Performed by: FAMILY MEDICINE

## 2022-04-15 PROCEDURE — 93880 EXTRACRANIAL BILAT STUDY: CPT

## 2022-04-15 PROCEDURE — 78306 BONE IMAGING WHOLE BODY: CPT

## 2022-04-15 RX ORDER — TC 99M MEDRONATE 20 MG/10ML
27.3 INJECTION, POWDER, LYOPHILIZED, FOR SOLUTION INTRAVENOUS
Status: COMPLETED | OUTPATIENT
Start: 2022-04-15 | End: 2022-04-15

## 2022-04-15 RX ADMIN — TC 99M MEDRONATE 27.3 MILLICURIE: 20 INJECTION, POWDER, LYOPHILIZED, FOR SOLUTION INTRAVENOUS at 09:25

## 2022-05-05 ENCOUNTER — HOSPITAL ENCOUNTER (OUTPATIENT)
Dept: NON INVASIVE DIAGNOSTICS | Age: 57
Discharge: HOME | End: 2022-05-05
Payer: COMMERCIAL

## 2022-05-05 VITALS — WEIGHT: 210 LBS | BODY MASS INDEX: 41.23 KG/M2 | HEIGHT: 60 IN

## 2022-05-05 DIAGNOSIS — I10 HYPERTENSION, UNSPECIFIED TYPE: ICD-10-CM

## 2022-05-05 DIAGNOSIS — R06.09 DYSPNEA ON EXERTION: ICD-10-CM

## 2022-05-05 LAB
LV EF: 81 %
LVEF MODALITY: NORMAL

## 2022-05-05 PROCEDURE — 78452 HT MUSCLE IMAGE SPECT MULT: CPT

## 2022-05-05 PROCEDURE — 3430000000 HC RX DIAGNOSTIC RADIOPHARMACEUTICAL: Performed by: FAMILY MEDICINE

## 2022-05-05 PROCEDURE — A9500 TC99M SESTAMIBI: HCPCS | Performed by: FAMILY MEDICINE

## 2022-05-05 PROCEDURE — 93017 CV STRESS TEST TRACING ONLY: CPT | Performed by: NUCLEAR MEDICINE

## 2022-05-05 RX ADMIN — Medication 10.1 MILLICURIE: at 08:17

## 2022-05-05 RX ADMIN — Medication 33.7 MILLICURIE: at 09:28

## 2022-05-06 ENCOUNTER — HOSPITAL ENCOUNTER (EMERGENCY)
Age: 57
Discharge: HOME | End: 2022-05-06
Attending: EMERGENCY MEDICINE
Payer: COMMERCIAL

## 2022-05-06 ENCOUNTER — APPOINTMENT (OUTPATIENT)
Dept: CT IMAGING | Age: 57
End: 2022-05-06
Payer: COMMERCIAL

## 2022-05-06 VITALS
WEIGHT: 210 LBS | SYSTOLIC BLOOD PRESSURE: 132 MMHG | OXYGEN SATURATION: 96 % | HEIGHT: 60 IN | DIASTOLIC BLOOD PRESSURE: 62 MMHG | BODY MASS INDEX: 41.23 KG/M2 | HEART RATE: 79 BPM | TEMPERATURE: 98 F | RESPIRATION RATE: 19 BRPM

## 2022-05-06 DIAGNOSIS — M48.00 SPINAL STENOSIS, UNSPECIFIED SPINAL REGION: ICD-10-CM

## 2022-05-06 DIAGNOSIS — R29.6 FREQUENT FALLS: Primary | ICD-10-CM

## 2022-05-06 DIAGNOSIS — R29.898 WEAKNESS OF BOTH LOWER EXTREMITIES: ICD-10-CM

## 2022-05-06 DIAGNOSIS — S80.00XA CONTUSION OF KNEE, UNSPECIFIED LATERALITY, INITIAL ENCOUNTER: ICD-10-CM

## 2022-05-06 LAB
ALBUMIN SERPL-MCNC: 3.4 GM/DL (ref 3.4–5)
ALP BLD-CCNC: 165 U/L (ref 46–116)
ALT SERPL-CCNC: 71 U/L (ref 14–63)
ANION GAP: 8 MEQ/L (ref 8–16)
AST SERPL-CCNC: 53 U/L (ref 15–37)
BASOPHILS # BLD: 0.6 % (ref 0–3)
BILIRUB SERPL-MCNC: 0.5 MG/DL (ref 0.2–1)
BUN BLDV-MCNC: 11 MG/DL (ref 7–18)
CHLORIDE BLD-SCNC: 104 MEQ/L (ref 98–107)
CHP ED QC CHECK: YES
CO2: 28 MEQ/L (ref 21–32)
CREAT SERPL-MCNC: 0.7 MG/DL (ref 0.6–1.3)
EOSINOPHILS RELATIVE PERCENT: 1.9 % (ref 0–4)
GFR, ESTIMATED: > 90 ML/MIN/1.73M2
GLUCOSE BLD-MCNC: 172 MG/DL
GLUCOSE BLD-MCNC: 172 MG/DL (ref 70–108)
GLUCOSE BLD-MCNC: 185 MG/DL (ref 74–106)
HCT VFR BLD CALC: 39.2 % (ref 37–47)
HEMOGLOBIN: 13.3 GM/DL (ref 12–16)
LYMPHOCYTES # BLD: 28.5 % (ref 15–47)
MAGNESIUM: 2 MG/DL (ref 1.8–2.4)
MCH RBC QN AUTO: 31.4 PG (ref 27–31)
MCHC RBC AUTO-ENTMCNC: 34 GM/DL (ref 33–37)
MCV RBC AUTO: 92.2 FL (ref 81–99)
MONOCYTES: 8.6 % (ref 0–12)
PDW BLD-RTO: 14.7 % (ref 11.5–14.5)
PLATELET # BLD: 190 THOU/MM3 (ref 130–400)
PMV BLD AUTO: 6.8 FL (ref 7.4–10.4)
POC CALCIUM: 8.6 MG/DL (ref 8.5–10.1)
POTASSIUM SERPL-SCNC: 3.5 MEQ/L (ref 3.5–5.1)
RBC # BLD: 4.25 MILL/MM3 (ref 4.2–5.4)
SEGS: 60.4 % (ref 43–75)
SODIUM BLD-SCNC: 140 MEQ/L (ref 136–145)
TOTAL PROTEIN: 7 GM/DL (ref 6.4–8.2)
TROPONIN, HIGH SENSITIVITY: 7.9 PG/ML (ref 0–51.3)
WBC # BLD: 7.7 THOU/MM3 (ref 4.8–10.8)

## 2022-05-06 PROCEDURE — 84484 ASSAY OF TROPONIN QUANT: CPT

## 2022-05-06 PROCEDURE — 85025 COMPLETE CBC W/AUTO DIFF WBC: CPT

## 2022-05-06 PROCEDURE — 99284 EMERGENCY DEPT VISIT MOD MDM: CPT

## 2022-05-06 PROCEDURE — 93005 ELECTROCARDIOGRAM TRACING: CPT | Performed by: EMERGENCY MEDICINE

## 2022-05-06 PROCEDURE — 72131 CT LUMBAR SPINE W/O DYE: CPT

## 2022-05-06 PROCEDURE — 80053 COMPREHEN METABOLIC PANEL: CPT

## 2022-05-06 PROCEDURE — 70450 CT HEAD/BRAIN W/O DYE: CPT

## 2022-05-06 PROCEDURE — 82948 REAGENT STRIP/BLOOD GLUCOSE: CPT

## 2022-05-06 PROCEDURE — 83735 ASSAY OF MAGNESIUM: CPT

## 2022-05-06 RX ORDER — CYCLOBENZAPRINE HCL 10 MG
10 TABLET ORAL DAILY
COMMUNITY
Start: 2022-05-02 | End: 2022-07-08 | Stop reason: SDUPTHER

## 2022-05-06 RX ORDER — ASPIRIN 81 MG/1
81 TABLET, COATED ORAL DAILY
COMMUNITY
Start: 2022-05-02

## 2022-05-06 RX ORDER — METOPROLOL SUCCINATE 25 MG/1
25 TABLET, EXTENDED RELEASE ORAL DAILY
COMMUNITY
Start: 2022-05-02

## 2022-05-06 RX ORDER — ATORVASTATIN CALCIUM 20 MG/1
20 TABLET, FILM COATED ORAL DAILY
COMMUNITY
Start: 2022-05-02

## 2022-05-06 RX ORDER — AMLODIPINE BESYLATE 10 MG/1
10 TABLET ORAL DAILY
COMMUNITY
Start: 2022-04-07

## 2022-05-06 ASSESSMENT — ENCOUNTER SYMPTOMS
BACK PAIN: 1
BLURRED VISION: 0
NAUSEA: 0
SHORTNESS OF BREATH: 0
ABDOMINAL PAIN: 0
WHEEZING: 0
COUGH: 0
DOUBLE VISION: 0

## 2022-05-06 ASSESSMENT — PAIN - FUNCTIONAL ASSESSMENT
PAIN_FUNCTIONAL_ASSESSMENT: NONE - DENIES PAIN
PAIN_FUNCTIONAL_ASSESSMENT: NONE - DENIES PAIN

## 2022-05-06 NOTE — ED PROVIDER NOTES
[facility]  eMERGENCY dEPARTMENT eNCOUnter             Jeannette Gonzalez 17 COMPLAINT    Chief Complaint   Patient presents with    Dizziness    Fall     5/4/22, 5/6/22       Nurses Notes reviewed and I agree except as noted in the HPI. HPI    Mickey Wu is a 59 y.o. female who presents stating that she has fallen twice recently, and is concerned that something might be wrong. She states that today, she was trying to  a piece of paper that blew out of her car in a parking lot. She lost her balance and fell forward onto her knees. Her knees are not very painful, and she was able to get up without help. She had no loss of consciousness. She states if she feels like her legs \"just give out on her \". She loses her balance unexpectedly. She has no headache, blurry vision, double vision. She has minimal pain in her knees. She is ambulatory, and drove herself here. Minimal pain at this time. REVIEW OF SYSTEMS      Review of Systems   Constitutional: Negative for fever. HENT: Negative for congestion. Eyes: Negative for blurred vision and double vision. Respiratory: Negative for cough, shortness of breath and wheezing. Cardiovascular: Negative for chest pain, palpitations and leg swelling. Gastrointestinal: Negative for abdominal pain and nausea. Genitourinary: Negative for dysuria. Musculoskeletal: Positive for back pain (at times) and falls. Negative for neck pain. Neurological: Positive for weakness. Negative for sensory change, focal weakness and headaches. Endo/Heme/Allergies: Does not bruise/bleed easily. All other systems reviewed and are negative. PAST MEDICAL HISTORY     has a past medical history of Anxiety, Depression, Diverticulitis, Hypertension, Interstitial cystitis, OCD (obsessive compulsive disorder), Restless leg syndrome, and Sleep apnea.     SURGICAL HISTORY     has a past surgical history that includes Bladder surgery (10-13-09); Abdominal adhesion surgery (10-13-09); Hysterectomy (10-13-09); Endometrial biopsy (09); Cervical disc surgery (); Colonoscopy; eye surgery; Foot surgery (Left, 2017); colectomy (); and LAPAROTOMY EXPLORATORY (N/A, 2019). CURRENT MEDICATIONS    Discharge Medication List as of 2022  7:32 PM      CONTINUE these medications which have NOT CHANGED    Details   ASPIRIN LOW DOSE 81 MG EC tablet Take 81 mg by mouth daily, DAWHistorical Med      cyclobenzaprine (FLEXERIL) 10 MG tablet Take 10 mg by mouth daily Take 0.5 tabletHistorical Med      metFORMIN (GLUCOPHAGE) 500 MG tablet Take 500 mg by mouth in the morning and at bedtimeHistorical Med      metoprolol succinate (TOPROL XL) 25 MG extended release tablet Take 25 mg by mouth dailyHistorical Med      amLODIPine (NORVASC) 10 MG tablet Take 10 mg by mouth dailyHistorical Med      atorvastatin (LIPITOR) 20 MG tablet Take 20 mg by mouth daily Takes 1.5 tabletHistorical Med      escitalopram (LEXAPRO) 20 MG tablet Take 20 mg by mouth dailyHistorical Med      busPIRone (BUSPAR) 10 MG tablet Take 10 mg by mouth 2 times dailyHistorical Med      omeprazole (PRILOSEC) 40 MG delayed release capsule Take 40 mg by mouth dailyHistorical Med      montelukast (SINGULAIR) 10 MG tablet Take 10 mg by mouth nightlyHistorical Med      hydrochlorothiazide (HYDRODIURIL) 12.5 MG tablet Take 25 mg by mouth daily Historical Med      fluticasone (FLONASE) 50 MCG/ACT nasal spray 2 sprays by Nasal route daily, Disp-1 Bottle, R-11Normal             ALLERGIES    is allergic to norco [hydrocodone-acetaminophen]. FAMILY HISTORY    She indicated that her mother is alive. She indicated that her father is . She indicated that her sister is alive. She indicated that her brother is alive. family history includes Cirrhosis in her father; Diabetes in her father and sister;  Heart Disease in her father; High Blood Pressure in her father and sister. SOCIAL HISTORY     reports that she has never smoked. She has never used smokeless tobacco. She reports current alcohol use. She reports that she does not use drugs. PHYSICAL EXAM       INITIAL VITALS: /62   Pulse 79   Temp 98 °F (36.7 °C) (Temporal)   Resp 19   Ht 5' (1.524 m)   Wt 210 lb (95.3 kg)   SpO2 96%   BMI 41.01 kg/m²      Physical Exam  Vitals and nursing note reviewed. Constitutional:       General: She is not in acute distress. Appearance: She is not ill-appearing. HENT:      Head: Atraumatic. Right Ear: Tympanic membrane normal.      Left Ear: Tympanic membrane normal.      Nose: Nose normal. No congestion or rhinorrhea. Mouth/Throat:      Mouth: Mucous membranes are moist.      Pharynx: No oropharyngeal exudate. Eyes:      Pupils: Pupils are equal, round, and reactive to light. Cardiovascular:      Rate and Rhythm: Normal rate and regular rhythm. Heart sounds: No murmur heard. Pulmonary:      Effort: Pulmonary effort is normal. No respiratory distress. Breath sounds: Normal breath sounds. No wheezing. Abdominal:      General: Bowel sounds are normal.      Palpations: Abdomen is soft. There is no mass. Tenderness: There is no abdominal tenderness. Musculoskeletal:         General: No swelling or tenderness. Normal range of motion. Cervical back: Neck supple. Comments: When I ask her to hold her legs up, she can only hold them up against no resistance for about 2 seconds, then lets them drop to the bed. This happens bilaterally. Lymphadenopathy:      Cervical: No cervical adenopathy. Skin:     General: Skin is warm and dry. Neurological:      General: No focal deficit present. Mental Status: She is alert and oriented to person, place, and time.    Psychiatric:         Behavior: Behavior normal.           DIAGNOSTIC RESULTS    EKG     Normal sinus rhythm  Normal ECG  When compared with ECG of 11-FEB-2017 16:34,  No significant change was found      RADIOLOGY:    CT LUMBAR SPINE WO CONTRAST   Final Result   1. No acute findings. 2. Mild degenerative grade 1 retrolisthesis of L2 on L3. Underlying disc    bulge causing mild central canal stenosis. 3. Mild left neuroforaminal stenosis at L5-S1 from left foraminal disc    osteophyte complex. This document has been electronically signed by: Ivey Collet, MD on    05/06/2022 07:14 PM      All CTs at this facility use dose modulation techniques and iterative    reconstructions, and/or weight-based dosing   when appropriate to reduce radiation to a low as reasonably achievable. CT HEAD WO CONTRAST   Final Result   No acute intracranial findings. This document has been electronically signed by: Ivey Collet, MD on    05/06/2022 07:14 PM      All CTs at this facility use dose modulation techniques and iterative    reconstructions, and/or weight-based dosing   when appropriate to reduce radiation to a low as reasonably achievable.              LABS:     Labs Reviewed   CBC WITH AUTO DIFFERENTIAL - Abnormal; Notable for the following components:       Result Value    MCH 31.4 (*)     RDW 14.7 (*)     MPV 6.8 (*)     All other components within normal limits   COMPREHENSIVE METABOLIC PANEL - Abnormal; Notable for the following components:    Glucose 185 (*)     AST 53 (*)     Alkaline Phosphatase 165 (*)     ALT 71 (*)     All other components within normal limits   POCT GLUCOSE - Abnormal; Notable for the following components:    POC Glucose 172 (*)     All other components within normal limits   POCT GLUCOSE - Normal   MAGNESIUM   TROPONIN   GLOMERULAR FILTRATION RATE, ESTIMATED   ANION GAP       Vitals:    Vitals:    05/06/22 1733 05/06/22 1853 05/06/22 1908   BP: (!) 150/59 122/60 132/62   Pulse: 89 77 79   Resp: 16 16 19   Temp: 98 °F (36.7 °C)     TempSrc: Temporal     SpO2: 97% 97% 96%   Weight: 210 lb (95.3 kg)     Height: 5' (1.524 m)         EMERGENCY DEPARTMENT COURSE:    Test results and plan of care discussed. She will follow up with her doctor. FINAL IMPRESSION      1. Frequent falls    2. Contusion of knee, unspecified laterality, initial encounter    3. Weakness of both lower extremities    4.  Spinal stenosis, unspecified spinal region        DISPOSITION/PLAN    DISPOSITION Decision To Discharge 05/06/2022 07:31:16 PM      PATIENT REFERRED TO:    Blayne Santos MD  [address] [place]  [phone]    Schedule an appointment as soon as possible for a visit         DISCHARGE MEDICATIONS:    Discharge Medication List as of 5/6/2022  7:32 PM             (Please note that portions of this note were completed with a voice recognition program.  Efforts were made to edit the dictations but occasionally words are mis-transcribed.)      Thomas Tariq MD  05/06/22 2039

## 2022-05-06 NOTE — ED NOTES
Reviewed discharge instructions with patient. She verbalizes understanding. All needs addressed and questions answered before patient discharged. Patient assisted to private auto via wheelchair.       Brandie Monte RN  05/06/22 3503

## 2022-05-07 LAB
EKG ATRIAL RATE: 89 BPM
EKG P AXIS: 61 DEGREES
EKG P-R INTERVAL: 144 MS
EKG Q-T INTERVAL: 382 MS
EKG QRS DURATION: 76 MS
EKG QTC CALCULATION (BAZETT): 464 MS
EKG R AXIS: 41 DEGREES
EKG T AXIS: 73 DEGREES
EKG VENTRICULAR RATE: 89 BPM

## 2022-05-07 PROCEDURE — 93010 ELECTROCARDIOGRAM REPORT: CPT | Performed by: INTERNAL MEDICINE

## 2022-06-24 ENCOUNTER — TELEPHONE (OUTPATIENT)
Dept: FAMILY MEDICINE CLINIC | Age: 57
End: 2022-06-24

## 2022-06-24 ENCOUNTER — NURSE TRIAGE (OUTPATIENT)
Dept: OTHER | Facility: CLINIC | Age: 57
End: 2022-06-24

## 2022-06-24 ENCOUNTER — OFFICE VISIT (OUTPATIENT)
Dept: FAMILY MEDICINE CLINIC | Age: 57
End: 2022-06-24
Payer: COMMERCIAL

## 2022-06-24 VITALS
DIASTOLIC BLOOD PRESSURE: 68 MMHG | BODY MASS INDEX: 42.01 KG/M2 | HEIGHT: 60 IN | OXYGEN SATURATION: 99 % | WEIGHT: 214 LBS | HEART RATE: 80 BPM | SYSTOLIC BLOOD PRESSURE: 110 MMHG

## 2022-06-24 DIAGNOSIS — M79.89 BILATERAL SWELLING OF FEET: Primary | ICD-10-CM

## 2022-06-24 LAB
ANION GAP SERPL CALCULATED.3IONS-SCNC: 12 MEQ/L (ref 8–16)
BUN BLDV-MCNC: 12 MG/DL (ref 7–22)
CALCIUM SERPL-MCNC: 9 MG/DL (ref 8.5–10.5)
CHLORIDE BLD-SCNC: 99 MEQ/L (ref 98–111)
CO2: 26 MEQ/L (ref 23–33)
CREAT SERPL-MCNC: 0.7 MG/DL (ref 0.4–1.2)
GFR SERPL CREATININE-BSD FRML MDRD: 86 ML/MIN/1.73M2
GLUCOSE BLD-MCNC: 185 MG/DL (ref 70–108)
POTASSIUM SERPL-SCNC: 4.4 MEQ/L (ref 3.5–5.2)
PRO-BNP: 8.7 PG/ML (ref 0–900)
SODIUM BLD-SCNC: 137 MEQ/L (ref 135–145)

## 2022-06-24 PROCEDURE — 1036F TOBACCO NON-USER: CPT | Performed by: NURSE PRACTITIONER

## 2022-06-24 PROCEDURE — 3017F COLORECTAL CA SCREEN DOC REV: CPT | Performed by: NURSE PRACTITIONER

## 2022-06-24 PROCEDURE — 99213 OFFICE O/P EST LOW 20 MIN: CPT | Performed by: NURSE PRACTITIONER

## 2022-06-24 PROCEDURE — 36415 COLL VENOUS BLD VENIPUNCTURE: CPT | Performed by: NURSE PRACTITIONER

## 2022-06-24 PROCEDURE — G8427 DOCREV CUR MEDS BY ELIG CLIN: HCPCS | Performed by: NURSE PRACTITIONER

## 2022-06-24 PROCEDURE — G8417 CALC BMI ABV UP PARAM F/U: HCPCS | Performed by: NURSE PRACTITIONER

## 2022-06-24 RX ORDER — HYDROCHLOROTHIAZIDE 25 MG/1
25 TABLET ORAL DAILY
Qty: 30 TABLET | Refills: 11 | Status: SHIPPED | OUTPATIENT
Start: 2022-06-24 | End: 2022-08-31

## 2022-06-24 SDOH — ECONOMIC STABILITY: FOOD INSECURITY: WITHIN THE PAST 12 MONTHS, THE FOOD YOU BOUGHT JUST DIDN'T LAST AND YOU DIDN'T HAVE MONEY TO GET MORE.: NEVER TRUE

## 2022-06-24 SDOH — ECONOMIC STABILITY: FOOD INSECURITY: WITHIN THE PAST 12 MONTHS, YOU WORRIED THAT YOUR FOOD WOULD RUN OUT BEFORE YOU GOT MONEY TO BUY MORE.: NEVER TRUE

## 2022-06-24 ASSESSMENT — PATIENT HEALTH QUESTIONNAIRE - PHQ9
9. THOUGHTS THAT YOU WOULD BE BETTER OFF DEAD, OR OF HURTING YOURSELF: 0
SUM OF ALL RESPONSES TO PHQ QUESTIONS 1-9: 0
4. FEELING TIRED OR HAVING LITTLE ENERGY: 0
6. FEELING BAD ABOUT YOURSELF - OR THAT YOU ARE A FAILURE OR HAVE LET YOURSELF OR YOUR FAMILY DOWN: 0
SUM OF ALL RESPONSES TO PHQ QUESTIONS 1-9: 0
SUM OF ALL RESPONSES TO PHQ QUESTIONS 1-9: 0
2. FEELING DOWN, DEPRESSED OR HOPELESS: 0
7. TROUBLE CONCENTRATING ON THINGS, SUCH AS READING THE NEWSPAPER OR WATCHING TELEVISION: 0
SUM OF ALL RESPONSES TO PHQ QUESTIONS 1-9: 0
3. TROUBLE FALLING OR STAYING ASLEEP: 0
5. POOR APPETITE OR OVEREATING: 0
10. IF YOU CHECKED OFF ANY PROBLEMS, HOW DIFFICULT HAVE THESE PROBLEMS MADE IT FOR YOU TO DO YOUR WORK, TAKE CARE OF THINGS AT HOME, OR GET ALONG WITH OTHER PEOPLE: 0

## 2022-06-24 ASSESSMENT — SOCIAL DETERMINANTS OF HEALTH (SDOH): HOW HARD IS IT FOR YOU TO PAY FOR THE VERY BASICS LIKE FOOD, HOUSING, MEDICAL CARE, AND HEATING?: NOT HARD AT ALL

## 2022-06-24 NOTE — PROGRESS NOTES
[address] [address]  Dept: [phone]  Dept Fax: [phone]  Loc: [phone]      Cherie York is a 59 y.o. female who presents todayfor Edema      HPI:      Has not taken HCTZ since 2018. . she did not realize it was on her medication list.       Feet with swelling, a bit better this am, because she has had them elevated    Showed a picture       The patient is allergic to norco [hydrocodone-acetaminophen]. Past MedicalHistory  Yolanda Alexander  has a past medical history of Anxiety, Depression, Diverticulitis, Hypertension, Interstitial cystitis, OCD (obsessive compulsive disorder), Restless leg syndrome, and Sleep apnea. Medications    Current Outpatient Medications:     hydroCHLOROthiazide (HYDRODIURIL) 25 MG tablet, Take 1 tablet by mouth daily, Disp: 30 tablet, Rfl: 11    ASPIRIN LOW DOSE 81 MG EC tablet, Take 81 mg by mouth daily, Disp: , Rfl:     cyclobenzaprine (FLEXERIL) 10 MG tablet, Take 10 mg by mouth daily Take 0.5 tablet, Disp: , Rfl:     metFORMIN (GLUCOPHAGE) 500 MG tablet, Take 500 mg by mouth in the morning and at bedtime, Disp: , Rfl:     metoprolol succinate (TOPROL XL) 25 MG extended release tablet, Take 25 mg by mouth daily, Disp: , Rfl:     amLODIPine (NORVASC) 10 MG tablet, Take 10 mg by mouth daily, Disp: , Rfl:     atorvastatin (LIPITOR) 20 MG tablet, Take 20 mg by mouth daily Takes 1.5 tablet, Disp: , Rfl:     escitalopram (LEXAPRO) 20 MG tablet, Take 20 mg by mouth daily, Disp: , Rfl:     busPIRone (BUSPAR) 10 MG tablet, Take 10 mg by mouth 2 times daily, Disp: , Rfl:     omeprazole (PRILOSEC) 40 MG delayed release capsule, Take 40 mg by mouth daily, Disp: , Rfl:     montelukast (SINGULAIR) 10 MG tablet, Take 10 mg by mouth nightly, Disp: , Rfl:     Subjective:      Review of Systems   Constitutional: Negative for appetite change, chills, fatigue and fever.    HENT: Negative for congestion, ear discharge, sinus pressure, tinnitus and voice change. Eyes: Negative for pain, discharge, itching and visual disturbance. Respiratory: Negative for cough, choking, chest tightness, shortness of breath and wheezing. Cardiovascular: Positive for leg swelling (feet swelling). Negative for chest pain and palpitations. Gastrointestinal: Negative for abdominal distention, abdominal pain, constipation, nausea and vomiting. Endocrine: Negative for cold intolerance and heat intolerance. Genitourinary: Negative for dysuria, hematuria, vaginal discharge and vaginal pain. Musculoskeletal: Negative for arthralgias, back pain, gait problem, neck pain and neck stiffness. Skin: Negative for color change and rash. Neurological: Negative for dizziness, syncope, speech difficulty, light-headedness, numbness and headaches. Psychiatric/Behavioral: Negative for behavioral problems, confusion, self-injury and suicidal ideas. The patient is not nervous/anxious. Objective:        Vitals:    06/24/22 1110   BP: 110/68   Site: Left Upper Arm   Position: Sitting   Cuff Size: Large Adult   Pulse: 80   SpO2: 99%   Weight: 214 lb (97.1 kg)   Height: 5' (1.524 m)      Physical Exam  Vitals and nursing note reviewed. Constitutional:       General: She is not in acute distress. Appearance: She is well-developed. She is not diaphoretic. HENT:      Head: Normocephalic and atraumatic. Right Ear: Tympanic membrane and external ear normal. Tympanic membrane is not injected or erythematous. Left Ear: Tympanic membrane and external ear normal. Tympanic membrane is not injected or erythematous. Nose: Nose normal.      Mouth/Throat:      Pharynx: Uvula midline. Eyes:      General:         Right eye: No discharge. Left eye: No discharge. Conjunctiva/sclera: Conjunctivae normal.      Pupils: Pupils are equal, round, and reactive to light.    Neck:      Thyroid: No thyromegaly. Trachea: Trachea normal.   Cardiovascular:      Rate and Rhythm: Normal rate and regular rhythm. Pulses: Normal pulses. Heart sounds: Normal heart sounds, S1 normal and S2 normal. No murmur heard. No friction rub. No gallop. Comments: Bilateral feet with slight swelling, no pitting noted. Pulmonary:      Effort: Pulmonary effort is normal. No respiratory distress. Breath sounds: Normal breath sounds. No wheezing or rales. Chest:      Chest wall: No tenderness. Abdominal:      General: Bowel sounds are normal. There is no distension. Palpations: Abdomen is soft. There is no mass. Tenderness: There is no abdominal tenderness. There is no guarding or rebound. Musculoskeletal:         General: No tenderness or deformity. Normal range of motion. Cervical back: Full passive range of motion without pain, normal range of motion and neck supple. Lymphadenopathy:      Cervical: No cervical adenopathy. Skin:     General: Skin is warm and dry. Capillary Refill: Capillary refill takes less than 2 seconds. Neurological:      Mental Status: She is alert and oriented to person, place, and time. Deep Tendon Reflexes: Reflexes are normal and symmetric. Assessment/Plan:       Daniela Perez was seen today for edema. Diagnoses and all orders for this visit:    Bilateral swelling of feet  -     Brain Natriuretic Peptide; Future  -     Basic Metabolic Panel; Future  -     hydroCHLOROthiazide (HYDRODIURIL) 25 MG tablet; Take 1 tablet by mouth daily  -     Basic Metabolic Panel  -     Brain Natriuretic Peptide    Other orders  -     Anion Gap  -     Glomerular Filtration Rate, Estimated    Patient realized she had not taken her HCTZ for at least 2 years, resuming this. Denies any SOB or chest pain. Checking labs above. F/u in 4 weeks or sooner if needed. Declines health maintenance testing at this time.     Return in about 4 weeks (around 7/22/2022). Patient instructions given and reviewed.     Electronicallysigned by TORO Ruelas CNP on 7/7/2022 at 4:35 PM

## 2022-06-24 NOTE — TELEPHONE ENCOUNTER
Pt called with bilateral feet/ankle swelling. She wanted to see only PCP so scheduled for July. Wanted to let you know in case there was something you would want to do in the mean time.

## 2022-06-24 NOTE — PATIENT INSTRUCTIONS
Patient Education        Low Sodium Diet (2,000 Milligram): Care Instructions  Overview     Limiting sodium can be an important part of managing some health problems. The most common source of sodium is salt. People get most of the salt in their diet from canned, prepared, and packaged foods. Fast food and restaurant meals also are very high in sodium. Your doctor will probably limit your sodium to less than 2,000 milligrams (mg) a day. This limit counts all the sodium inprepared and packaged foods and any salt you add to your food. Follow-up care is a key part of your treatment and safety. Be sure to make and go to all appointments, and call your doctor if you are having problems. It's also a good idea to know your test results and keep alist of the medicines you take. How can you care for yourself at home? Read food labels   Read labels on cans and food packages. The labels tell you how much sodium is in each serving. Make sure that you look at the serving size. If you eat more than the serving size, you have eaten more sodium.  Food labels also tell you the Percent Daily Value for sodium. Choose products with low Percent Daily Values for sodium.  Be aware that sodium can come in forms other than salt, including monosodium glutamate (MSG), sodium citrate, and sodium bicarbonate (baking soda). MSG is often added to Asian food. When you eat out, you can sometimes ask for food without MSG or added salt. Buy low-sodium foods   Buy foods that are labeled \"unsalted\" (no salt added), \"sodium-free\" (less than 5 mg of sodium per serving), or \"low-sodium\" (140 mg or less of sodium per serving). Foods labeled \"reduced-sodium\" and \"light sodium\" may still have too much sodium. Be sure to read the label to see how much sodium you are getting.  Buy fresh vegetables, or frozen vegetables without added sauces. Buy low-sodium versions of canned vegetables, soups, and other canned goods.   Prepare low-sodium meals   Cut back on the amount of salt you use in cooking. This will help you adjust to the taste. Do not add salt after cooking. One teaspoon of salt has about 2,300 mg of sodium.  Take the salt shaker off the table.  Flavor your food with garlic, lemon juice, onion, vinegar, herbs, and spices. Do not use soy sauce, lite soy sauce, steak sauce, onion salt, garlic salt, celery salt, or ketchup on your food.  Use low-sodium salad dressings, sauces, and ketchup. Or make your own salad dressings and sauces without adding salt.  Use less salt (or none) when recipes call for it. You can often use half the salt a recipe calls for without losing flavor. Other foods such as rice, pasta, and grains do not need added salt.  Rinse canned vegetables, and cook them in fresh water. This removes somebut not allof the salt.  Avoid water that is naturally high in sodium or that has been treated with water softeners, which add sodium. If you buy bottled water, read the label and choose a sodium-free brand. Avoid high-sodium foods   Avoid eating:  ? Smoked, cured, salted, and canned meat, fish, and poultry. ? Ham, adams, hot dogs, and luncheon meats. ? Regular, hard, and processed cheese and regular peanut butter. ? Crackers with salted tops, and other salted snack foods such as pretzels, chips, and salted popcorn. ? Frozen prepared meals, unless labeled low-sodium. ? Canned and dried soups, broths, and bouillon, unless labeled sodium-free or low-sodium. ? Canned vegetables, unless labeled sodium-free or low-sodium. ? Western Steffi fries, pizza, tacos, and other fast foods. ? Pickles, olives, ketchup, and other condiments, especially soy sauce, unless labeled sodium-free or low-sodium. Where can you learn more? Go to https://alexx.healthi-dispo.com. org and sign in to your ScreenTag account. Enter N838 in the [facility] box to learn more about \"Low Sodium Diet (2,000 Milligram): Care Instructions. \" If you do not have an account, please click on the \"Sign Up Now\" link. Current as of: September 8, 2021               Content Version: 13.3  © 2006-2022 Healthwise, Incorporated. Care instructions adapted under license by [facility] ([facility]). If you have questions about a medical condition or this instruction, always ask your healthcare professional. [address] any warranty or liability for your use of this information. Patient Education        Learning About Low-Sodium Foods  What foods are low in sodium? The foods you eat contain nutrients, such as vitamins and minerals. Sodium is a nutrient. Your body needs the right amount to stay healthy and work as it should. You can use the list below to help you make choices about which foodsto eat. Fruits   Fresh, frozen, canned, or dried fruit  Vegetables   Fresh or frozen vegetables, with no added salt   Canned vegetables, low-sodium or with no added salt  Grains   Bagels without salted tops   Cereal with no added salt   Corn tortillas   Crackers with no added salt   Oatmeal, cooked without salt   Popcorn with no salt   Pasta and noodles, cooked without salt   Rice, cooked without salt   Unsalted pretzels  Dairy and dairy alternatives   Butter, unsalted   Cream cheese   Ice cream   Milk   Soy milk  Meats and other protein foods   Beans and peas, canned with no salt   Eggs   Fresh fish (not smoked)   Fresh meats (not smoked or cured)   Nuts and nut butter, prepared without salt   Poultry, not packaged with sodium solution   Tofu, unseasoned   Tuna, canned without salt  Seasonings   Garlic   Herbs and spices   Lemon juice   Mustard   Olive oil   Salt-free seasoning mixes   Vinegar  Work with your doctor to find out how much of this nutrient you need. Dependingon your health, you may need more or less of it in your diet. Where can you learn more? Go to https://chgerardeb.health-partners. org and sign in to your myWebRoom account. Enter B997 in the [facility] box to learn more about \"Learning About Low-Sodium Foods. \"     If you do not have an account, please click on the \"Sign Up Now\" link. Current as of: September 8, 2021               Content Version: 13.3  © 2006-2022 [employer]. Care instructions adapted under license by [facility] ([facility]). If you have questions about a medical condition or this instruction, always ask your healthcare professional. [place] [address] any warranty or liability for your use of this information. Patient Education        Leg and Ankle Edema: Care Instructions  Your Care Instructions  Swelling in the legs, ankles, and feet is called edema. It is common after you sit or stand for a while. Long plane flights or car rides often cause swelling in the legs and feet. You may also have swelling if you have to stand for long periods of time at your job. Problems with the veins in the legs (varicose veins) and changes in hormones can also cause swelling. Sometimes the swelling in the ankles and feet is caused by a more serious problem, such as heartfailure, infection, blood clots, or liver or kidney disease. Follow-up care is a key part of your treatment and safety. Be sure to make and go to all appointments, and call your doctor if you are having problems. It's also a good idea to know your test results and keep alist of the medicines you take. How can you care for yourself at home?  If your doctor gave you medicine, take it as prescribed. Call your doctor if you think you are having a problem with your medicine.  Whenever you are resting, raise your legs up. Try to keep the swollen area higher than the level of your heart.  Take breaks from standing or sitting in one position. ? Walk around to increase the blood flow in your lower legs. ? Move your feet and ankles often while you stand, or tighten and relax your leg muscles.    Wear support stockings. Put them on in the morning, before swelling gets worse.  Eat a balanced diet. Lose weight if you need to.  Limit the amount of salt (sodium) in your diet. Salt holds fluid in the body and may increase swelling. When should you call for help? Call 911 anytime you think you may need emergency care. For example, call if:     You have symptoms of a blood clot in your lung (called a pulmonary embolism). These may include:  ? Sudden chest pain. ? Trouble breathing. ? Coughing up blood. Call your doctor now or seek immediate medical care if:     You have signs of a blood clot, such as:  ? Pain in your calf, back of the knee, thigh, or groin. ? Redness and swelling in your leg or groin.      You have symptoms of infection, such as:  ? Increased pain, swelling, warmth, or redness. ? Red streaks or pus. ? A fever. Watch closely for changes in your health, and be sure to contact your doctor if:     Your swelling is getting worse.      You have new or worsening pain in your legs.      You do not get better as expected. Where can you learn more? Go to https://iNest RealtypeFlayr.Genasys. org and sign in to your [email or web address] account. Enter N583 in the [employer] box to learn more about \"Leg and Ankle Edema: Care Instructions. \"     If you do not have an account, please click on the \"Sign Up Now\" link. Current as of: March 9, 2022               Content Version: 13.3  © 2006-2022 Healthwise, [facility]. Care instructions adapted under license by [facility] ([facility]). If you have questions about a medical condition or this instruction, always ask your healthcare professional. [place] [address] any warranty or liability for your use of this information.

## 2022-06-24 NOTE — TELEPHONE ENCOUNTER
Received call from Tyler Poon at [facility] with [employer]. Subjective: Caller states \"These last couple of days my feet have been swelling\"     Current Symptoms: bilateral feet and ankle swelling    Onset: 3 days ago;         Pain Severity: 4/10;  intermittent    Denies - chest pain / shortness of breath / red streaking or discoloration to feet ankle or legs    Temperature: denies     What has been tried: elevation        Recommended disposition: See in Office Today    Care advice provided, patient verbalizes understanding; denies any other questions or concerns; instructed to call back for any new or worsening symptoms. Patient/Caller agrees with recommended disposition; writer provided warm transfer to Vidya Pena at [facility] for appointment scheduling     Attention Provider: Thank you for allowing me to participate in the care of your patient. The patient was connected to triage in response to information provided to the ECC/PSC. Please do not respond through this encounter as the response is not directed to a shared pool.         Reason for Disposition   MODERATE swelling of both ankles (e.g., swelling extends up to the knees) AND new-onset or worsening    Protocols used: LEG SWELLING AND EDEMA-ADULT-OH

## 2022-07-07 ASSESSMENT — ENCOUNTER SYMPTOMS
ABDOMINAL DISTENTION: 0
EYE ITCHING: 0
CHOKING: 0
VOMITING: 0
VOICE CHANGE: 0
EYE DISCHARGE: 0
ABDOMINAL PAIN: 0
BACK PAIN: 0
CHEST TIGHTNESS: 0
SHORTNESS OF BREATH: 0
SINUS PRESSURE: 0
WHEEZING: 0
COLOR CHANGE: 0
COUGH: 0
CONSTIPATION: 0
NAUSEA: 0
EYE PAIN: 0

## 2022-07-08 ENCOUNTER — OFFICE VISIT (OUTPATIENT)
Dept: FAMILY MEDICINE CLINIC | Age: 57
End: 2022-07-08
Payer: COMMERCIAL

## 2022-07-08 VITALS
OXYGEN SATURATION: 98 % | WEIGHT: 213 LBS | HEIGHT: 60 IN | DIASTOLIC BLOOD PRESSURE: 72 MMHG | BODY MASS INDEX: 41.82 KG/M2 | SYSTOLIC BLOOD PRESSURE: 124 MMHG | HEART RATE: 80 BPM

## 2022-07-08 DIAGNOSIS — M54.42 CHRONIC BILATERAL LOW BACK PAIN WITH BILATERAL SCIATICA: Primary | ICD-10-CM

## 2022-07-08 DIAGNOSIS — G89.29 CHRONIC BILATERAL LOW BACK PAIN WITH BILATERAL SCIATICA: Primary | ICD-10-CM

## 2022-07-08 DIAGNOSIS — R26.81 UNSTEADY GAIT WHEN WALKING: ICD-10-CM

## 2022-07-08 DIAGNOSIS — M54.41 CHRONIC BILATERAL LOW BACK PAIN WITH BILATERAL SCIATICA: Primary | ICD-10-CM

## 2022-07-08 DIAGNOSIS — R29.898 LEG WEAKNESS, BILATERAL: ICD-10-CM

## 2022-07-08 DIAGNOSIS — R29.6 FREQUENT FALLS: ICD-10-CM

## 2022-07-08 PROCEDURE — 99214 OFFICE O/P EST MOD 30 MIN: CPT | Performed by: FAMILY MEDICINE

## 2022-07-08 RX ORDER — CYCLOBENZAPRINE HCL 10 MG
10 TABLET ORAL
Qty: 30 TABLET | Refills: 1 | Status: SHIPPED | OUTPATIENT
Start: 2022-07-08 | End: 2022-09-13

## 2022-07-08 RX ORDER — BUSPIRONE HYDROCHLORIDE 15 MG/1
15 TABLET ORAL 3 TIMES DAILY
COMMUNITY
Start: 2022-07-07

## 2022-07-08 NOTE — PROGRESS NOTES
[facility] [address] [place] [id number]  Dept: [phone]     SUBJECTIVE     Sandhya Ziegler is a 59 y. o.female    Pt presents for follow up edema in B/L feet/ankles. Pt states edema has improved but still notices a little after 12 hr shift. Pt still dizzy and physical therapy is not helping. Patient Active Problem List   Diagnosis    Anxiety    Depression    OCD (obsessive compulsive disorder)    Essential hypertension    Other constipation    Adnexal mass    S/P exploratory laparotomy       Current Outpatient Medications   Medication Sig Dispense Refill    cyclobenzaprine (FLEXERIL) 10 MG tablet Take 1 tablet by mouth nightly as needed for Muscle spasms Take 0.5 tablet 30 tablet 1    busPIRone (BUSPAR) 15 MG tablet Take 15 mg by mouth 3 times daily      hydroCHLOROthiazide (HYDRODIURIL) 25 MG tablet Take 1 tablet by mouth daily 30 tablet 11    ASPIRIN LOW DOSE 81 MG EC tablet Take 81 mg by mouth daily      metFORMIN (GLUCOPHAGE) 500 MG tablet Take 500 mg by mouth in the morning and at bedtime      metoprolol succinate (TOPROL XL) 25 MG extended release tablet Take 25 mg by mouth daily      amLODIPine (NORVASC) 10 MG tablet Take 10 mg by mouth daily      atorvastatin (LIPITOR) 20 MG tablet Take 20 mg by mouth daily Takes 1.5 tablet      escitalopram (LEXAPRO) 20 MG tablet Take 20 mg by mouth daily      omeprazole (PRILOSEC) 40 MG delayed release capsule Take 40 mg by mouth daily      montelukast (SINGULAIR) 10 MG tablet Take 10 mg by mouth nightly       No current facility-administered medications for this visit. Review of Systems   Neurological: Positive for dizziness and syncope. OBJECTIVE     /72 (Site: Right Upper Arm, Position: Sitting, Cuff Size: Large Adult)   Pulse 80   Ht 5' (1.524 m)   Wt 213 lb (96.6 kg)   SpO2 98%   BMI 41.60 kg/m²   Body mass index is 41.6 kg/m².   BP Readings from Last 3 Encounters: 07/08/22 124/72   06/24/22 110/68   05/06/22 132/62         Physical Exam  Vitals and nursing note reviewed. Constitutional:       General: She is not in acute distress. Appearance: Normal appearance. She is normal weight. She is not ill-appearing. HENT:      Head: Normocephalic and atraumatic. Right Ear: Tympanic membrane, ear canal and external ear normal.      Left Ear: Tympanic membrane, ear canal and external ear normal.      Nose: Nose normal.      Mouth/Throat:      Mouth: Mucous membranes are moist.      Pharynx: Oropharynx is clear. Eyes:      Extraocular Movements: Extraocular movements intact. Conjunctiva/sclera: Conjunctivae normal.      Pupils: Pupils are equal, round, and reactive to light. Cardiovascular:      Rate and Rhythm: Normal rate and regular rhythm. Pulses: Normal pulses. Heart sounds: No murmur heard. Pulmonary:      Effort: Pulmonary effort is normal. No respiratory distress. Breath sounds: Normal breath sounds. No wheezing. Abdominal:      General: Abdomen is flat. Bowel sounds are normal. There is no distension. Palpations: Abdomen is soft. There is no mass. Tenderness: There is no abdominal tenderness. There is no guarding or rebound. Musculoskeletal:         General: No swelling. Normal range of motion. Cervical back: Normal range of motion. No tenderness. Right lower leg: No edema. Left lower leg: No edema. Lymphadenopathy:      Cervical: No cervical adenopathy. Skin:     General: Skin is warm. Findings: No rash. Neurological:      General: No focal deficit present. Mental Status: She is alert. Psychiatric:         Mood and Affect: Mood normal.         No results found for this visit on 07/08/22.     Lab Results   Component Value Date    LABA1C 6.4 03/31/2022       No results found for: CHOL, TRIG, HDL, LDLCALC, LDLDIRECT    The ASCVD Risk score (Yolanda Raymond., et al., 2013) failed to calculate for the following reasons:    Cannot find a previous HDL lab    Cannot find a previous total cholesterol lab    Lab Results   Component Value Date     06/24/2022    K 4.4 06/24/2022    CL 99 06/24/2022    CO2 26 06/24/2022    BUN 12 06/24/2022    CREATININE 0.7 06/24/2022    GLUCOSE 185 (H) 06/24/2022    CALCIUM 9.0 06/24/2022    PROT 7.0 05/06/2022    LABALBU 3.4 05/06/2022    BILITOT 0.5 05/06/2022    ALKPHOS 165 (H) 05/06/2022    AST 53 (H) 05/06/2022    ALT 71 (H) 05/06/2022    LABGLOM 86 (A) 06/24/2022     estimated creatinine clearance is 90 mL/min (based on SCr of 0.7 mg/dL). No results found for: Ibeth Raring    Lab Results   Component Value Date    TSH 0.963 03/31/2022       Lab Results   Component Value Date    WBC 7.7 05/06/2022    HGB 13.3 05/06/2022    HCT 39.2 05/06/2022    MCV 92.2 05/06/2022     05/06/2022       No results found for: PSA, PSADIA    Immunization History   Administered Date(s) Administered    COVID-19, PFIZER PURPLE top, DILUTE for use, (age 15 y+), 30mcg/0.3mL 03/17/2021, 04/07/2021, 12/23/2021       Health Maintenance   Topic Date Due    Lipids  Never done    HIV screen  Never done    Hepatitis C screen  Never done    Breast cancer screen  Never done    Shingles vaccine (1 of 2) Never done    Colorectal Cancer Screen  11/04/2020    Flu vaccine (1) 09/01/2022    A1C test (Diabetic or Prediabetic)  03/31/2023    Depression Monitoring  06/24/2023    DTaP/Tdap/Td vaccine (2 - Td or Tdap) 08/04/2026    COVID-19 Vaccine  Completed    Hepatitis A vaccine  Aged Out    Hepatitis B vaccine  Aged Out    Hib vaccine  Aged Out    Meningococcal (ACWY) vaccine  Aged Out    Pneumococcal 0-64 years Vaccine  Aged Out       No future appointments. ASSESSMENT       Diagnosis Orders   1. Chronic bilateral low back pain with bilateral sciatica  MRI LUMBAR SPINE WO CONTRAST   2. Leg weakness, bilateral     3.  Unsteady gait when walking  Bret Robles MD,

## 2022-07-18 ENCOUNTER — TELEPHONE (OUTPATIENT)
Dept: FAMILY MEDICINE CLINIC | Age: 57
End: 2022-07-18

## 2022-07-18 NOTE — TELEPHONE ENCOUNTER
Pre cert team calling to see where patient has had therapy at- insurance is requesting patient have 6 weeks of PT complete. . Per Dr.Leopold note patient has completed 6 weeks of PT    Called patient- she went to [facility] in [place]-     Once records received please scan into media and call [phone] to let them know records were received

## 2022-07-18 NOTE — TELEPHONE ENCOUNTER
Called PT [facility] in [place]- [phone]- they are closed for the day - will try back tomorrow

## 2022-08-12 ENCOUNTER — HOSPITAL ENCOUNTER (OUTPATIENT)
Dept: MRI IMAGING | Age: 57
Discharge: HOME | End: 2022-08-12
Payer: COMMERCIAL

## 2022-08-12 DIAGNOSIS — M54.42 CHRONIC BILATERAL LOW BACK PAIN WITH BILATERAL SCIATICA: ICD-10-CM

## 2022-08-12 DIAGNOSIS — G89.29 CHRONIC BILATERAL LOW BACK PAIN WITH BILATERAL SCIATICA: ICD-10-CM

## 2022-08-12 DIAGNOSIS — M54.41 CHRONIC BILATERAL LOW BACK PAIN WITH BILATERAL SCIATICA: ICD-10-CM

## 2022-08-12 PROCEDURE — 72148 MRI LUMBAR SPINE W/O DYE: CPT

## 2022-08-16 ENCOUNTER — TELEPHONE (OUTPATIENT)
Dept: FAMILY MEDICINE CLINIC | Age: 57
End: 2022-08-16

## 2022-08-16 NOTE — TELEPHONE ENCOUNTER
Patient called back and was provided her results. Patient states that she will follow with neuro on 8/31/2022. Patient stated understanding.

## 2022-08-16 NOTE — TELEPHONE ENCOUNTER
----- Message from Joce Ortega MD sent at 8/16/2022  8:27 AM EDT -----  Please notify Tootie she has small areas of disc bulge in the mid back, nothing that is narrowing the spinal canal or significantly compressing on the surrounding structures. She has a follow up with Neuro scheduled on 8/31/22 it appears.

## 2022-08-31 ENCOUNTER — INITIAL CONSULT (OUTPATIENT)
Dept: NEUROLOGY | Age: 57
End: 2022-08-31
Payer: COMMERCIAL

## 2022-08-31 VITALS
BODY MASS INDEX: 41.62 KG/M2 | DIASTOLIC BLOOD PRESSURE: 70 MMHG | HEIGHT: 60 IN | OXYGEN SATURATION: 97 % | WEIGHT: 212 LBS | SYSTOLIC BLOOD PRESSURE: 120 MMHG | HEART RATE: 73 BPM

## 2022-08-31 DIAGNOSIS — R29.6 FREQUENT FALLS: Primary | ICD-10-CM

## 2022-08-31 DIAGNOSIS — R42 DIZZINESS: ICD-10-CM

## 2022-08-31 PROCEDURE — 99204 OFFICE O/P NEW MOD 45 MIN: CPT | Performed by: PSYCHIATRY & NEUROLOGY

## 2022-08-31 PROCEDURE — G8417 CALC BMI ABV UP PARAM F/U: HCPCS | Performed by: PSYCHIATRY & NEUROLOGY

## 2022-08-31 PROCEDURE — G8427 DOCREV CUR MEDS BY ELIG CLIN: HCPCS | Performed by: PSYCHIATRY & NEUROLOGY

## 2022-08-31 NOTE — PATIENT INSTRUCTIONS
Cervical spine x-ray  EEG  Tilt table test  30 day cardiac event monitor  Vitamin B12, folate  Vitamin B6 level  Call with any new symptoms or concerns. Follow up in 7 weeks.

## 2022-09-09 ENCOUNTER — HOSPITAL ENCOUNTER (OUTPATIENT)
Dept: NEUROLOGY | Age: 57
Discharge: HOME | End: 2022-09-09
Payer: COMMERCIAL

## 2022-09-09 DIAGNOSIS — R29.6 FREQUENT FALLS: ICD-10-CM

## 2022-09-09 DIAGNOSIS — R42 DIZZINESS: ICD-10-CM

## 2022-09-09 PROCEDURE — 95819 EEG AWAKE AND ASLEEP: CPT | Performed by: PSYCHIATRY & NEUROLOGY

## 2022-09-09 NOTE — PROGRESS NOTES
[facility] Technician worksheet       EEG Date: 2022    Name: Dorcas Tyson   : 1965   Age: 59 y.o. SEX: female    Room: op    MRN: [id number]     CSN: [id number]    Ordering Provider: leopold  EEG Number: [id number] Time of Test:  0530    Hand: Right   Sedation: No    H.V. Done: Yes with good effort Photic: Yes    Sleep: Yes   Drowsy: No   Sleep Deprived: No    Seizures observed: no    Mentality: lethargic       Clinical History: patient here for frequent falls and dizziness . Last fall was about 2 weeks ago, has dizziness before and can usually sit down to avoid passing out. CT of the head 2022  Impression   No acute intracranial findings. MRI of the brain 3/31/2022  Impression       1. Diminished signal intensity in the bone marrow within the calvarium and skull base. Total body bone scan may be helpful for better evaluation. 2. Punctate areas of increased signal intensity in the white matter most likely representing ischemic changes. 3. No evidence of an acute infarct. 4. Otherwise negative MRI scan of the brain with and without intravenous contrast..                 Past Medical History:       Diagnosis Date    Anxiety     Depression     Not taking any medications    Diverticulitis     Hypertension     Interstitial cystitis     OCD (obsessive compulsive disorder)     Restless leg syndrome     Takes medications as needed    Sleep apnea     uses cpap         Prior to Admission medications    Medication Sig Start Date End Date Taking?  Authorizing Provider   busPIRone (BUSPAR) 15 MG tablet Take 15 mg by mouth 3 times daily 22   Historical Provider, MD   cyclobenzaprine (FLEXERIL) 10 MG tablet Take 1 tablet by mouth nightly as needed for Muscle spasms Take 0.5 tablet 22   Hector Parham MD   hydroCHLOROthiazide (HYDRODIURIL) 25 MG tablet Take 1 tablet by mouth daily 22  TORO Bermeo - CNP   ASPIRIN LOW DOSE 81 MG EC tablet Take 81 mg by mouth daily 5/2/22   Historical Provider, MD   metFORMIN (GLUCOPHAGE) 500 MG tablet Take 500 mg by mouth in the morning and at bedtime 5/2/22   Historical Provider, MD   metoprolol succinate (TOPROL XL) 25 MG extended release tablet Take 25 mg by mouth daily 5/2/22   Historical Provider, MD   amLODIPine (NORVASC) 10 MG tablet Take 10 mg by mouth daily 4/7/22   Historical Provider, MD   atorvastatin (LIPITOR) 20 MG tablet Take 20 mg by mouth daily Takes 1.5 tablet 5/2/22   Historical Provider, MD   escitalopram (LEXAPRO) 20 MG tablet Take 20 mg by mouth daily    Historical Provider, MD   omeprazole (PRILOSEC) 40 MG delayed release capsule Take 40 mg by mouth daily    Historical Provider, MD   montelukast (SINGULAIR) 10 MG tablet Take 10 mg by mouth nightly    Historical Provider, MD       Technician: Alexandria Taylor 9/9/2022

## 2022-09-12 ENCOUNTER — TELEPHONE (OUTPATIENT)
Dept: NEUROLOGY | Age: 57
End: 2022-09-12

## 2022-09-12 RX ORDER — LEVETIRACETAM 500 MG/1
500 TABLET ORAL 2 TIMES DAILY
Qty: 60 TABLET | Refills: 3 | Status: SHIPPED | OUTPATIENT
Start: 2022-09-12

## 2022-09-12 NOTE — PROGRESS NOTES
Chief Complaint   Patient presents with    Consultation     unsteady gait, falls       Yoni Toscano is a 59 y.o. female who presents today for evaluation of frequent falls for the past 1 year that has progressively gotten worse. She can fall in all directions. She does not lose consciousness with the falls. Her last fall was 2 days ago. She does endorse some dizziness prior to falling. If she sits down quickly she can avert a fall. She has never been told she has low blood pressure. She is diabetic for the past 1 year. She does have occasional heart palpitation. MRI brain done W/WO contrast 3/31/22 showed no acute findings. MRI lumbar spine done 8/12/22 showed mild degenerative changes. She does have some neck pain. She has previous history of 2 neck surgeries. She has sleep apnea and wears her CPAP. She has also done physical therapy without benefit. She denies chest pain. No shortness of breath. No vision changes. No dysphagia. No fever. No rash. No weight loss. History provided by patient.       Past Medical History:   Diagnosis Date    Anxiety     Depression     Not taking any medications    Diverticulitis     Hypertension     Interstitial cystitis     OCD (obsessive compulsive disorder)     Restless leg syndrome     Takes medications as needed    Sleep apnea     uses cpap       Patient Active Problem List   Diagnosis    Anxiety    Depression    OCD (obsessive compulsive disorder)    Essential hypertension    Other constipation    Adnexal mass    S/P exploratory laparotomy       Allergies   Allergen Reactions    Hydrocodone-Acetaminophen Nausea And Vomiting       Current Outpatient Medications   Medication Sig Dispense Refill    busPIRone (BUSPAR) 15 MG tablet Take 15 mg by mouth 3 times daily      cyclobenzaprine (FLEXERIL) 10 MG tablet Take 1 tablet by mouth nightly as needed for Muscle spasms Take 0.5 tablet 30 tablet 1    hydroCHLOROthiazide (HYDRODIURIL) 25 MG tablet Take 1 tablet by mouth daily 30 tablet 11    ASPIRIN LOW DOSE 81 MG EC tablet Take 81 mg by mouth daily      metFORMIN (GLUCOPHAGE) 500 MG tablet Take 500 mg by mouth in the morning and at bedtime      metoprolol succinate (TOPROL XL) 25 MG extended release tablet Take 25 mg by mouth daily      amLODIPine (NORVASC) 10 MG tablet Take 10 mg by mouth daily      atorvastatin (LIPITOR) 20 MG tablet Take 20 mg by mouth daily Takes 1.5 tablet      escitalopram (LEXAPRO) 20 MG tablet Take 20 mg by mouth daily      omeprazole (PRILOSEC) 40 MG delayed release capsule Take 40 mg by mouth daily      montelukast (SINGULAIR) 10 MG tablet Take 10 mg by mouth nightly       No current facility-administered medications for this visit. Social History     Socioeconomic History    Marital status:    Occupational History    Occupation: [employer]    Tobacco Use    Smoking status: Never    Smokeless tobacco: Never   Vaping Use    Vaping Use: Never used   Substance and Sexual Activity    Alcohol use: Yes     Alcohol/week: 0.0 standard drinks     Comment:  once  a year    Drug use: No    Sexual activity: Not Currently     Social Determinants of Health     Financial Resource Strain: Low Risk     Difficulty of Paying Living Expenses: Not hard at all   Food Insecurity: No Food Insecurity    Worried About Running Out of Food in the Last Year: Never true    Ran Out of Food in the Last Year: Never true       Family History   Problem Relation Age of Onset    Diabetes Father     High Blood Pressure Father     Heart Disease Father     Cirrhosis Father     High Blood Pressure Sister     Diabetes Sister          I reviewed the past medical history, allergies, medications, social history and family history.    Review of Systems   All systems reviewed, and are all negative, except what is mentioned in HPI      Vitals:    08/31/22 1326   BP: 120/70   Site: Left Upper Arm   Position: Sitting   Cuff Size: Large Adult   Pulse: 73   SpO2: 97%   Weight: 212 lb (96.2 kg)   Height: 5' (1.524 m)       Physical Examination:  General appearance - alert, well appearing, and in no distress, oriented to person, place, and time and over weight  Mental status- Level of Alertness: awake  Orientation: person, place, time  Memory: normal  Fund of Knowledge: normal  Attention/Concentration: normal  Language: normal. Mood is flat, poor eye contact  Neck - supple, no significant adenopathy, carotids upstroke normal bilaterally. There is no axillary lymphadenopathy. There is no carotid bruit. No neck lymphadenopathy. No thyroid enlargement   Neurological -   Cranial Dsonap-UT-RDV:.   Cranial nerve II: Normal   Cranial nerve III: Pupils: equal, round, reactive to light  Cranial nerves III, IV, VI: Extraocular Movements: intact   Cranial nerve V: Facial sensation: intact   Cranial nerve VII:Facial strength: intact   Cranial nerve VIII: Hearing: intact   Cranial nerve IX: Palate Elevation intact bilaterally  Cranial nerve XI: Shoulder shrug intact bilaterally  Cranial nerve XII: Tongue midline   neck supple without rigidity, there is no limitation of range of motion of the neck. DTR's are Intact distal and symmetric  Babinski sign negative  Motor exam is 5/5 in the upper and lower extremities. Normal muscle tone. There is no muscle atrophy. Sensory is intact for light touch. Coordination: finger to nose,  intact  Gait and station intact  Abnormal movement none, vibration  reduced distally  Skin - warm, dry to touch, normal coloration, no rashes, no suspicious skin lesions  Superficial temporal artery pulses are normal.   There is no limitation of range of motion of the neck. There is no resting tremor, no pin rolling, no bradykinesia, no Hypohonia, normal blink rate. Musculoskeletal: Has no hand arthritis, no limitation of ROM in any of the four extremities. There is no leg edema. The Heart was regular in rate and rhythm. No heart murmur  Chest Clear, with  good effort. Abdomen soft, intact bowel sounds. Results for orders placed during the hospital encounter of 07/15/14    MRI Cervical Spine Edin Jones          Patient Name: Seth Ramírez  Patient Type: [facility]  OP           MRN:  [id number]  Gender:  Female                   Account Number:  [de-identified]  [facility] Phys:  Raulito Keita       YOB: 1965  M.D. Pt Loc:  [facility] Outpt      R a d i o l o g y   R e p o r t    Accession Number:  Procedure Name:             Exam Date/Time:  [id number]      MRI Spine Cervical Canal WO 7/15/2014 4:18:00 PM  Contr    CPT4 code  16764      Reason For Exam  neck pain;neck pain      REPORT  MRI CERVICAL SPINE WITHOUT CONTRAST:    CLINICAL INFORMATION:  Neck surgery 2004, neck pain with bilateral arm  tingling and numbness. TECHNIQUE:  Using a 1.5 Vane GE scanner, sagittal T2 and T1 weighted FAST  spin echo and T2 weighted short TAU inversion recovery images of the  cervical spine were obtained. Axial T2 weighted FAST spin echo and T2  weighted gradient recall images were obtained in block fashion from the  base of C2 inferiorly through superior T2, oriented grossly parallel to the  C5-6 disc space. Axial T2 weighted high resolution gradient recall images  were also provided, extending from the odontoid base inferiorly through  superior T2, oriented grossly parallel to the C5-6 disc space as well. No IV contrast administered. COMPARISON:  No prior cervical spine imaging for comparison. FINDINGS:  There are surgical changes with anterior plate and screw system  present, extending from C5 and C6. Susceptibility artifact slightly limits  examination. Marrow signals show no concerning findings. Alignment is satisfactory. The disc at C5-6 shows osseous fusion features,. Remaining intervertebral  discs are desiccated diffusely.     Spinal cord parenchyma imaged shows normal volume and signal, of the  subarachnoid spaces. Susceptibility artifact limits evaluation but this  appears as a result of both bulging disc material and end plate  osteophytes. Incomplete effacement of the subarachnoid spaces results. There is very minimal spinal cord remodeling. The foramina are very  minimally narrowed bilaterally. C7-T1 canal and foramina are grossly patent. Surrounding tissues show no concerning findings. Impression  Surgical fusion of C5-6. Susceptibility artifact limits  evaluation. However, multi level degenerative disc changes are present  that result in mild stenosis of the canal at C3-4 and C4-5, more so at C6-      Page 2 of 3              Print date/time:7/17/2014 5:23 PM        [facility]          Patient Name: Ninfa Walker  Patient Type: [facility]  OP           MRN:  [id number]  Gender:  Female                   Account Number:  [de-identified]  [facility] Phys:  Frank Joyce       YOB: 1965  MSOHAN Pt Loc:  [facility] Outpt      R a d i o l o g y   R e p o r t    Accession Number:  Procedure Name:             Exam Date/Time:  [id number]      MRI Spine Cervical Canal WO 7/15/2014 4:18:00 PM  Contr    7. Less impressive foraminal stenosis, compared to the canal stenosis. No  strong evidence of myelopathic change. **FINAL REPORT**  Dictated By :  Colt Myers M.D. Certified Electronic Signature  [facility].  Dictated Date and Time:  15-JUL-2014 17:13  Transcribed By:  Sebas Niño  Transcribed Date and Time:  17-JUL-2014 13:24  Approved By:  Colt Myers M.D.   Approve Date and Time:  17-JUL-2014 17:23    Technologist:  Sin SAUER(R)(MR)      Page 3 of 3              Print date/time:7/17/2014 5:23 PM        Results for orders placed during the hospital encounter of 03/31/22    MRI BRAIN W WO CONTRAST    Narrative  PROCEDURE: MRI BRAIN W [address] INFORMATIONAbnormality of gait, Facial numbness. COMPARISON: No prior study. TECHNIQUE: Multiplanar and multiple spin echo T1 and T2-weighted images were obtained through the brain before and after the administration of intravenous contrast.    FINDINGS:      The diffusion-weighted images are normal. The brain volume is normal.There are no intra-or extra-axial collections. There is no hydrocephalus, midline shift or mass effect. On the FLAIR and T2-weighted sequences, there is a punctate areas of increased signal intensity in the white matter most likely representing ischemic changes. On the gradient echo T2-weighted images, there aren't no areas of susceptibility artifact noted. There is no abnormal enhancement in the brain. The major intracranial vascular flow voids are present. The midline craniocervical junction structures are normal.  The brainstem and pituitary gland are normal.    There is diminished signal intensity in the bony calvarium and skull base. Total body bone scan may be helpful for better evaluation. Impression  1. Diminished signal intensity in the bone marrow within the calvarium and skull base. Total body bone scan may be helpful for better evaluation. 2. Punctate areas of increased signal intensity in the white matter most likely representing ischemic changes. 3. No evidence of an acute infarct. 4. Otherwise negative MRI scan of the brain with and without intravenous contrast..  **This report has been created using voice recognition software. It may contain minor errors which are inherent in voice recognition technology. **      Final report electronically signed by DR Cassandra Martínez on 3/31/2022 1:59 PM      CT HEAD WO CONTRAST    Narrative  CT head without contrast    Comparison: MR,SR - MRI BRAIN W WO CONTRAST - 03/31/2022 01:05 PM EDT    Findings:  No intra-axial mass, midline shift, hydrocephalus, or acute hemorrhage.   Mild periventricular hypodensity may relate to chronic small vessel  ischemic changes. No significant atrophy-like change. There is no sinus or mastoid fluid. The orbits are unremarkable. There is no acute fracture. Impression  No acute intracranial findings. This document has been electronically signed by: Josemanuel Dee MD on  05/06/2022 07:14 PM    All CTs at this facility use dose modulation techniques and iterative  reconstructions, and/or weight-based dosing  when appropriate to reduce radiation to a low as reasonably achievable. Cardiac echo done 4/5/22:  Conclusions      Summary   Normal left ventricle size and systolic function. Ejection fraction was   estimated at 60 %. There were no regional left ventricular wall motion   abnormalities and wall thickness was within normal limits. Doppler parameters were consistent with abnormal left ventricular   relaxation (grade 1 diastolic dysfunction). Signature      ----------------------------------------------------------------   Electronically signed by Leia Wilkinson MD (Interpreting   physician) on 04/15/2022 at 04:42 PM   ----------------------------------------------------------------      We reviewed the patient records from referring provider and available information in the EHR       ASSESSMENT:      Diagnosis Orders   1. Frequent falls        2. Dizziness           This is a 66-year-old female who presents with frequent falls for the past 1 year that is progressively gotten worse. She can fall in all directions. She denies any loss of consciousness with her falls. I reviewed the MRI Brain and agree with interpretation, I also reviewed the patient pertinent labs and records in the EHR and from other providers. MRI brain done W/WO contrast 3/31/22 showed no acute findings. MRI lumbar spine done 8/12/22 showed mild degenerative changes. She does have some neck pain. She has previous history of 2 neck surgeries.   We will arrange for her to undergo a cervical spine x-ray evaluate the alignment of her neck as well as an EEG to screen for cortical irritability. We will also obtain a tilt table test to evaluate for orthostatic hypotension/vasovagal syncope as well as a 30-day cardiac event monitor to screen for underlying occult arrhythmia. After detailed discussion with the patient we agreed on the following plan. Plan    Cervical spine x-ray  EEG  Tilt table test  30 day cardiac event monitor  Vitamin B12, folate  Vitamin B6 level  Call with any new symptoms or concerns. Follow up in 7 weeks.      Total time 64 min    Maricruz Lund MD

## 2022-09-12 NOTE — TELEPHONE ENCOUNTER
----- Message from TORO Swift CNP sent at 9/12/2022  2:51 PM EDT -----  Please let patient know her EEG was abnormal showing  occasional  bifrontal sharp waves that are of questionable significance though they  maybe epileptogenic in nature or indicate seizure tendency. We would recommend her start on keppra 500 mg two times a day. Script sent  to pharmacy on file  McKenzie, CNP

## 2022-09-12 NOTE — PROCEDURES
[address] [id number]                          ELECTROENCEPHALOGRAM REPORT    PATIENT NAME: Nasreen Pandey                  :        1965  MED REC NO:   [id number]                           ROOM:  ACCOUNT NO:   [de-identified]                           ADMIT DATE: 2022  PROVIDER:     Aida Araujo. Lory Grijalva MD    DATE OF EE2022    REFERRING PROVIDER:  Christopher Espinal CNP    CLINICAL HISTORY:  A 28-year-old female presenting with frequent falls,  dizzy spells. Past medical history significant for anxiety, depression,  diverticulitis, hypertension, interstitial cystitis, obsessive  compulsive disorder, restless legs syndrome, sleep apnea. Medications  listed are BuSpar, Flexeril, HydroDIURIL, aspirin, Glucophage, Toprol,  Norvasc, Lipitor, Lexapro, Prilosec, Singulair. CLINICAL INTERPRETATION:  This is a routine 20-minute EEG recording  using the international 10/20 system on a [employer] workstation. Automated  spike and seizure detection algorithms were applied. The background rhythm activity is noted to be 9 Hz in the posterior  parietal area, symmetric, well modulated, attenuates with eye opening. Hyperventilation was performed for 3 minutes with fair effort. Occasional bifrontal sharp waves were noted during the recording that  are of questionable significance though they maybe epileptogenic in  nature or indicate seizure tendency in the proper clinical context. Light stages of sleep are seen during the recording. Vertex sharp waves  were seen during the sleep part. EKG tracing revealed regular heart  rate. No clinical seizures were observed. Photic stimulation was  performed with poor driving seen. IMPRESSION:  This is an abnormal EEG due to presence of occasional  bifrontal sharp waves that are of questionable significance though they  maybe epileptogenic in nature or indicate seizure tendency.   No clinical  seizures were observed. Clinical correlation is recommended.         Chrystal Lara MD    D: 09/12/2022 9:39:34       T: 09/12/2022 9:43:52     [email or web address]  Job#: [id number]     Doc#: [id number]    CC:

## 2022-09-13 ENCOUNTER — HOSPITAL ENCOUNTER (OUTPATIENT)
Dept: NON INVASIVE DIAGNOSTICS | Age: 57
Discharge: HOME | End: 2022-09-13
Payer: COMMERCIAL

## 2022-09-13 DIAGNOSIS — R29.6 FREQUENT FALLS: ICD-10-CM

## 2022-09-13 DIAGNOSIS — R42 DIZZINESS: ICD-10-CM

## 2022-09-13 PROCEDURE — 93660 TILT TABLE EVALUATION: CPT | Performed by: INTERNAL MEDICINE

## 2022-09-13 PROCEDURE — 93270 REMOTE 30 DAY ECG REV/REPORT: CPT

## 2022-09-13 RX ORDER — CYCLOBENZAPRINE HCL 10 MG
TABLET ORAL
Qty: 30 TABLET | Refills: 1 | Status: SHIPPED | OUTPATIENT
Start: 2022-09-13

## 2022-09-13 NOTE — PROCEDURES
The skin was prepped and a 30 day cardiac event monitor was applied. The patient was instructed on the documentation of symptoms and the purpose of the monitor as well as the things to avoid while wearing the monitor. The patient was instructed to remove and return the monitor on 10/13/2022.   The serial number of the monitor that was applied is [id number]

## 2022-09-14 NOTE — PROCEDURES
[address] [place] [address]                                TILT TABLE TEST    PATIENT NAME: Burgess Agosto                  :        1965  MED REC NO:   [id number]                           ROOM:  ACCOUNT NO:   [de-identified]                           ADMIT DATE: 2022  PROVIDER:     Kylah Choi MD    DATE OF STUDY:  2022    INDICATION:  Fall. PROCEDURE:  After informed consent was obtained from the patient, she  was brought to the stress laboratory in a fasting and nonsedated state. The patient was made supine, hooked up to blood pressure, continuous  ECG, and IV access. Her blood pressure was 140/59 with the pulse of 72  upon resting. The patient was immediately tilted to 70  degrees and  observed for 30 minutes. Immediately upon tilting, the patient's blood  pressure was 121/55 with the pulse of 75 and immediately recovered to  144/62 with the pulse of 79.  10 minutes into the tilt, the patient's  blood pressure was 140/65 with the pulse of 81.  20 minutes into the  tilt, the patient's blood pressure was 150/63 with the pulse of 80.  25  minutes into the tilt, the patient's blood pressure was 155/62 with the  pulse of 84.  30 minutes into the tilt, the patient's blood pressure was  142/64 with the pulse of 85. The patient was made supine thereafter,  and then in the recovery, the patient's blood pressure was 131/54 with  the pulse of 76. Five minutes into the recovery, the patient's blood  pressure was 136/81 with pulse of 83. SYMPTOMS:  None recorded. ECG:  Normal sinus rhythm at baseline without any ST-T wave changes or  findings of atrial fibrillation, atrial flutter, high-grade AV block,  pauses greater than 3 seconds. No VT or VF were noted. No MS  prolongation was noted. No change in the RR interval was noted.     SUMMARY:  Negative tilt table examination to explain the etiology of the  fall. RECOMMENDATIONS:  Followup with the ordering provider for further  evaluation.         Whit Luu MD    D: 09/14/2022 5:21:32       T: 09/14/2022 10:24:22     [email or web address]  Job#: [id number]     Doc#: [id number]    CC:

## 2022-09-25 ENCOUNTER — HOSPITAL ENCOUNTER (OUTPATIENT)
Age: 57
Discharge: HOME | End: 2022-09-25
Payer: COMMERCIAL

## 2022-09-25 ENCOUNTER — HOSPITAL ENCOUNTER (OUTPATIENT)
Dept: GENERAL RADIOLOGY | Age: 57
Discharge: HOME | End: 2022-09-25
Payer: COMMERCIAL

## 2022-09-25 DIAGNOSIS — R29.6 FREQUENT FALLS: ICD-10-CM

## 2022-09-25 DIAGNOSIS — R42 DIZZINESS: ICD-10-CM

## 2022-09-25 PROCEDURE — 72050 X-RAY EXAM NECK SPINE 4/5VWS: CPT

## 2022-09-26 ENCOUNTER — HOSPITAL ENCOUNTER (OUTPATIENT)
Age: 57
Discharge: HOME | End: 2022-09-26
Payer: COMMERCIAL

## 2022-09-26 DIAGNOSIS — R42 DIZZINESS: ICD-10-CM

## 2022-09-26 DIAGNOSIS — R29.6 FREQUENT FALLS: ICD-10-CM

## 2022-09-26 PROCEDURE — 84207 ASSAY OF VITAMIN B-6: CPT

## 2022-09-26 PROCEDURE — 36415 COLL VENOUS BLD VENIPUNCTURE: CPT

## 2022-09-26 PROCEDURE — 82607 VITAMIN B-12: CPT

## 2022-09-26 PROCEDURE — 82746 ASSAY OF FOLIC ACID SERUM: CPT

## 2022-09-27 LAB
FOLATE: > 20 NG/ML (ref 4.8–24.2)
VITAMIN B-12: 1927 PG/ML (ref 211–911)

## 2022-10-02 LAB — VITAMIN B6: 41.6 NMOL/L (ref 20–125)

## 2022-10-15 NOTE — PROCEDURES
[address] [place] [address]                                 EVENT MONITOR    PATIENT NAME: Familia Blevins                  :        1965  MED REC NO:   [id number]                           ROOM:  ACCOUNT NO:   [de-identified]                           ADMIT DATE: 2022  PROVIDER:     Chintan Zhang MD      TEST TYPE:  30-day event monitor. MONITORING PERIOD:  2022 to 10/12/2022. INDICATION:  Dizziness and followup. FINDINGS:  Baseline rhythm was sinus rhythm with heart rate of 72 beats  per minute. No findings of atrial fibrillation, atrial flutter,  high-grade AV block, pauses greater than 3 seconds. No NSVT, VT or VF  were noted. No change in ST segments were noted. SUMMARY:  Unremarkable event monitor with no symptoms recorded.         Daxa Niño MD    D: 10/14/2022 13:07:31       T: 10/14/2022 13:43:16     [email or web address]  Job#: [id number]     Doc#: [id number]    CC:

## 2022-11-01 ENCOUNTER — OFFICE VISIT (OUTPATIENT)
Dept: FAMILY MEDICINE CLINIC | Age: 57
End: 2022-11-01
Payer: COMMERCIAL

## 2022-11-01 VITALS
TEMPERATURE: 98.4 F | SYSTOLIC BLOOD PRESSURE: 128 MMHG | HEIGHT: 60 IN | DIASTOLIC BLOOD PRESSURE: 74 MMHG | HEART RATE: 70 BPM | BODY MASS INDEX: 40.05 KG/M2 | OXYGEN SATURATION: 98 % | WEIGHT: 204 LBS

## 2022-11-01 DIAGNOSIS — J10.1 INFLUENZA A: Primary | ICD-10-CM

## 2022-11-01 DIAGNOSIS — R05.1 ACUTE COUGH: ICD-10-CM

## 2022-11-01 DIAGNOSIS — J18.9 COMMUNITY ACQUIRED PNEUMONIA OF LEFT LOWER LOBE OF LUNG: ICD-10-CM

## 2022-11-01 LAB
INFLUENZA VIRUS A RNA: POSITIVE
INFLUENZA VIRUS B RNA: NEGATIVE
Lab: NORMAL
QC PASS/FAIL: NORMAL
SARS-COV-2 RDRP RESP QL NAA+PROBE: NEGATIVE

## 2022-11-01 PROCEDURE — 3017F COLORECTAL CA SCREEN DOC REV: CPT | Performed by: FAMILY MEDICINE

## 2022-11-01 PROCEDURE — 3074F SYST BP LT 130 MM HG: CPT | Performed by: FAMILY MEDICINE

## 2022-11-01 PROCEDURE — 99213 OFFICE O/P EST LOW 20 MIN: CPT | Performed by: FAMILY MEDICINE

## 2022-11-01 PROCEDURE — 87502 INFLUENZA DNA AMP PROBE: CPT | Performed by: FAMILY MEDICINE

## 2022-11-01 PROCEDURE — G8417 CALC BMI ABV UP PARAM F/U: HCPCS | Performed by: FAMILY MEDICINE

## 2022-11-01 PROCEDURE — 1036F TOBACCO NON-USER: CPT | Performed by: FAMILY MEDICINE

## 2022-11-01 PROCEDURE — 3078F DIAST BP <80 MM HG: CPT | Performed by: FAMILY MEDICINE

## 2022-11-01 PROCEDURE — G8427 DOCREV CUR MEDS BY ELIG CLIN: HCPCS | Performed by: FAMILY MEDICINE

## 2022-11-01 PROCEDURE — 87635 SARS-COV-2 COVID-19 AMP PRB: CPT | Performed by: FAMILY MEDICINE

## 2022-11-01 PROCEDURE — G8484 FLU IMMUNIZE NO ADMIN: HCPCS | Performed by: FAMILY MEDICINE

## 2022-11-01 RX ORDER — BENZONATATE 100 MG/1
100 CAPSULE ORAL 3 TIMES DAILY PRN
Qty: 30 CAPSULE | Refills: 0 | Status: SHIPPED | OUTPATIENT
Start: 2022-11-01 | End: 2022-11-08

## 2022-11-01 RX ORDER — DOXYCYCLINE HYCLATE 100 MG
100 TABLET ORAL 2 TIMES DAILY
Qty: 10 TABLET | Refills: 0 | Status: SHIPPED | OUTPATIENT
Start: 2022-11-01 | End: 2022-11-06

## 2022-11-01 RX ORDER — AMOXICILLIN AND CLAVULANATE POTASSIUM 875; 125 MG/1; MG/1
1 TABLET, FILM COATED ORAL 2 TIMES DAILY
Qty: 14 TABLET | Refills: 0 | Status: SHIPPED | OUTPATIENT
Start: 2022-11-01 | End: 2022-11-01 | Stop reason: ALTCHOICE

## 2022-11-01 RX ORDER — AMOXICILLIN 500 MG/1
1000 CAPSULE ORAL 3 TIMES DAILY
Qty: 30 CAPSULE | Refills: 0 | Status: SHIPPED | OUTPATIENT
Start: 2022-11-01 | End: 2022-11-06

## 2022-11-01 ASSESSMENT — ENCOUNTER SYMPTOMS
COUGH: 1
SINUS PAIN: 1

## 2022-11-01 NOTE — PROGRESS NOTES
Sherineharleen Anderson is a 59 y. o.female      Pt complains of cough, congestion and fever since last Wed. Review of Systems   Constitutional:  Positive for fatigue and fever. HENT:  Positive for congestion, ear pain, postnasal drip, sinus pain and sneezing. Respiratory:  Positive for cough. OBJECTIVE     /74 (Site: Left Upper Arm, Position: Sitting, Cuff Size: Large Adult)   Pulse 70   Temp 98.4 °F (36.9 °C) (Oral)   Ht 5' (1.524 m)   Wt 204 lb (92.5 kg)   SpO2 98%   BMI 39.84 kg/m²     Physical Exam  Vitals and nursing note reviewed. Constitutional:       General: She is not in acute distress. Appearance: Normal appearance. She is normal weight. She is not ill-appearing or toxic-appearing. HENT:      Head: Normocephalic and atraumatic. Right Ear: Tympanic membrane, ear canal and external ear normal.      Left Ear: Ear canal and external ear normal. A middle ear effusion is present. Tympanic membrane is erythematous. Nose:      Right Sinus: Maxillary sinus tenderness and frontal sinus tenderness present. Left Sinus: Maxillary sinus tenderness and frontal sinus tenderness present. Mouth/Throat:      Mouth: Mucous membranes are moist.      Pharynx: Oropharynx is clear. No oropharyngeal exudate or posterior oropharyngeal erythema. Eyes:      Extraocular Movements: Extraocular movements intact. Conjunctiva/sclera: Conjunctivae normal.      Pupils: Pupils are equal, round, and reactive to light. Cardiovascular:      Rate and Rhythm: Normal rate and regular rhythm. Pulses: Normal pulses. Heart sounds: No murmur heard. Pulmonary:      Effort: Pulmonary effort is normal. No respiratory distress. Breath sounds: Normal breath sounds. No wheezing, rhonchi or rales. Abdominal:      General: Abdomen is flat. Bowel sounds are normal. There is no distension. Palpations: Abdomen is soft. There is no mass. Tenderness: There is no abdominal tenderness. There is no guarding or rebound. Musculoskeletal:         General: No swelling. Normal range of motion. Cervical back: Normal range of motion and neck supple. No tenderness. Right lower leg: No edema. Left lower leg: No edema. Lymphadenopathy:      Cervical: No cervical adenopathy. Skin:     General: Skin is warm. Findings: No rash. Neurological:      General: No focal deficit present. Mental Status: She is alert. Psychiatric:         Mood and Affect: Mood normal.         Results for POC orders placed in visit on 11/01/22   POCT Influenza A/B DNA (Alere i)   Result Value Ref Range    Influenza virus A RNA positive     Influenza virus B RNA negative            ASSESSMENT       Diagnosis Orders   1. Influenza A        2. Acute cough  XR CHEST STANDARD (2 VW)    XR CHEST STANDARD (2 VW)    POCT Influenza A/B DNA (Alere i)    POCT COVID-19 Rapid, NAAT      3. Community acquired pneumonia of left lower lobe of lung            PLAN     Influenza A- not yet vaccinated, 6 days into course of illness. CXR in office today indicates concurrent LLL pneumonia. Recommend treatment with amoxil 1 g TID and doxy 100 BID x 5 days. Tessalon 100 TID prn cough. Gabbie if not improving.         Electronically signed by Marilu Parrish MD on 11/1/2022 at 2:38 PM

## 2022-11-01 NOTE — LETTER
Mika Tiwari  [facility] [facility] [address]  Phone: [phone]  Fax: Gina Colbert MD        November 1, 2022     Patient: Pita Montiel   YOB: 1965   Date of Visit: 11/1/2022       To Whom It May Concern: It is my medical opinion that Pita Montiel should remain out of work until 11/4/22. If you have any questions or concerns, please don't hesitate to call.     Sincerely,        Mina De Leon MD

## 2022-12-01 RX ORDER — MONTELUKAST SODIUM 10 MG/1
TABLET ORAL
Qty: 30 TABLET | Refills: 0 | Status: SHIPPED | OUTPATIENT
Start: 2022-12-01

## 2022-12-02 RX ORDER — METOPROLOL SUCCINATE 25 MG/1
25 TABLET, EXTENDED RELEASE ORAL DAILY
Qty: 30 TABLET | Refills: 11 | Status: SHIPPED | OUTPATIENT
Start: 2022-12-02 | End: 2023-01-01

## 2022-12-05 ENCOUNTER — OFFICE VISIT (OUTPATIENT)
Dept: NEUROLOGY | Age: 57
End: 2022-12-05
Payer: COMMERCIAL

## 2022-12-05 VITALS
DIASTOLIC BLOOD PRESSURE: 70 MMHG | SYSTOLIC BLOOD PRESSURE: 125 MMHG | BODY MASS INDEX: 40.05 KG/M2 | OXYGEN SATURATION: 97 % | HEIGHT: 60 IN | HEART RATE: 73 BPM | WEIGHT: 204 LBS

## 2022-12-05 DIAGNOSIS — R29.6 FREQUENT FALLS: Primary | ICD-10-CM

## 2022-12-05 DIAGNOSIS — R26.89 BALANCE PROBLEM: ICD-10-CM

## 2022-12-05 PROCEDURE — G8484 FLU IMMUNIZE NO ADMIN: HCPCS | Performed by: NURSE PRACTITIONER

## 2022-12-05 PROCEDURE — 3078F DIAST BP <80 MM HG: CPT | Performed by: NURSE PRACTITIONER

## 2022-12-05 PROCEDURE — 99213 OFFICE O/P EST LOW 20 MIN: CPT | Performed by: NURSE PRACTITIONER

## 2022-12-05 PROCEDURE — G8427 DOCREV CUR MEDS BY ELIG CLIN: HCPCS | Performed by: NURSE PRACTITIONER

## 2022-12-05 PROCEDURE — 1036F TOBACCO NON-USER: CPT | Performed by: NURSE PRACTITIONER

## 2022-12-05 PROCEDURE — 3017F COLORECTAL CA SCREEN DOC REV: CPT | Performed by: NURSE PRACTITIONER

## 2022-12-05 PROCEDURE — G8417 CALC BMI ABV UP PARAM F/U: HCPCS | Performed by: NURSE PRACTITIONER

## 2022-12-05 PROCEDURE — 3074F SYST BP LT 130 MM HG: CPT | Performed by: NURSE PRACTITIONER

## 2022-12-05 NOTE — PATIENT INSTRUCTIONS
Change Keppra to 250 mg two times a day for 1 week, then change to 250 mg daily for 1 week, then stop  Recommend patient be evaluated by neurology at higher level of care, patient will think about it, and get back with us when she is ready to pursue this. Follow up as  needed. Call if any questions or concerns.

## 2022-12-05 NOTE — PROGRESS NOTES
NEUROLOGY OUT PATIENT FOLLOW UP NOTE:  12/5/20222:37 PM    Alexander Funes is here for follow up for balance trouble, dizziness. Patient Active Problem List   Diagnosis    Anxiety    Depression    OCD (obsessive compulsive disorder)    Essential hypertension    Other constipation    Adnexal mass    S/P exploratory laparotomy           ROS:  Respiratory : no cough, no shortness of breath  Cardiac: no chest pain. No palpitations.   Renal : no flank pain, no hematuria    Skin: no rash      Allergies   Allergen Reactions    Hydrocodone-Acetaminophen Nausea And Vomiting    Tricyclic Antidepressants Other (See Comments)       Current Outpatient Medications:     metoprolol succinate (TOPROL XL) 25 MG extended release tablet, Take 1 tablet by mouth daily, Disp: 30 tablet, Rfl: 11    montelukast (SINGULAIR) 10 MG tablet, take 1 tablet by mouth every evening, Disp: 30 tablet, Rfl: 0    cyclobenzaprine (FLEXERIL) 10 MG tablet, take 1/2 TO 1 tablet by mouth at bedtime if needed for muscle spasm, Disp: 30 tablet, Rfl: 1    levETIRAcetam (KEPPRA) 500 MG tablet, Take 1 tablet by mouth 2 times daily, Disp: 60 tablet, Rfl: 3    busPIRone (BUSPAR) 15 MG tablet, Take 15 mg by mouth 3 times daily, Disp: , Rfl:     hydroCHLOROthiazide (HYDRODIURIL) 25 MG tablet, Take 1 tablet by mouth daily, Disp: 30 tablet, Rfl: 11    ASPIRIN LOW DOSE 81 MG EC tablet, Take 81 mg by mouth daily, Disp: , Rfl:     metFORMIN (GLUCOPHAGE) 500 MG tablet, Take 500 mg by mouth in the morning and at bedtime, Disp: , Rfl:     amLODIPine (NORVASC) 10 MG tablet, Take 10 mg by mouth daily, Disp: , Rfl:     atorvastatin (LIPITOR) 20 MG tablet, Take 20 mg by mouth daily Takes 1.5 tablet, Disp: , Rfl:     escitalopram (LEXAPRO) 20 MG tablet, Take 20 mg by mouth daily, Disp: , Rfl:     omeprazole (PRILOSEC) 40 MG delayed release capsule, Take 40 mg by mouth daily, Disp: , Rfl:     I reviewed the past medical history, allergies, medications, social history and family history. PE:   Vitals:    12/05/22 1418   BP: 125/70   Site: Left Upper Arm   Position: Sitting   Cuff Size: Large Adult   Pulse: 73   SpO2: 97%   Weight: 204 lb (92.5 kg)   Height: 5' (1.524 m)     General Appearance:  awake, alert, oriented, in no acute distress  Gen: NAD, Language is Intact. Skin: no rash, lesion, dry  to touch. warm  Head: no rash, no icterus  Neck: There is no carotid bruits. The Neck is supple. There is no neck lymphadenopathy. Neuro: CN 2-12 grossly intact with no focal deficits. Power 5/5 Throughout symmetric, Reflexes are  symmetric. Long tracts are intact. Cerebellar exam is Intact. Sensory exam is intact to light touch. Gait is intact. Musculoskeletal:  Has no hand arthritis, no limitation of ROM in any of the four extremities. Lower extremities no edema          DATA:   9/26/22 1428     Vitamin B-12 211 - 911 pg/mL 1927 High     Folate 4.8 - 24.2 ng/mL > 20.0      9/26/22 1428      Vitamin B6, Plasma 20.0 - 125.0 nmol/L 41.6        Results for orders placed or performed in visit on 11/01/22   POCT Influenza A/B DNA (Alere i)   Result Value Ref Range    Influenza virus A RNA positive     Influenza virus B RNA negative    POCT COVID-19 Rapid, NAAT   Result Value Ref Range    SARS-COV-2, RdRp gene Negative Negative    Lot Number [id number]     QC Pass/Fail pass           Results for orders placed during the hospital encounter of 07/15/14    MRI Cervical Spine [address]          Patient Name: Bert Redmond  Patient Type: [facility]  OP           MRN:  [id number]  Gender:  Female                   Account Number:  [de-identified]  [facility] Phys:  Robert Carvalho       YOB: 1965  M.D.                               Pt Loc:  [facility] Outpt      R a d i o l o g y   R e p o r t    Accession Number:  Procedure Name:             Exam Date/Time:  [id number]      MRI Spine Cervical Canal WO 7/15/2014 4:18:00 PM  Contr    CPT4 code  23244      Reason For Exam  neck pain;neck pain      REPORT  MRI CERVICAL SPINE WITHOUT CONTRAST:    CLINICAL INFORMATION:  Neck surgery 2004, neck pain with bilateral arm  tingling and numbness. TECHNIQUE:  Using a 1.5 Vane GE scanner, sagittal T2 and T1 weighted FAST  spin echo and T2 weighted short TAU inversion recovery images of the  cervical spine were obtained. Axial T2 weighted FAST spin echo and T2  weighted gradient recall images were obtained in block fashion from the  base of C2 inferiorly through superior T2, oriented grossly parallel to the  C5-6 disc space. Axial T2 weighted high resolution gradient recall images  were also provided, extending from the odontoid base inferiorly through  superior T2, oriented grossly parallel to the C5-6 disc space as well. No IV contrast administered. COMPARISON:  No prior cervical spine imaging for comparison. FINDINGS:  There are surgical changes with anterior plate and screw system  present, extending from C5 and C6. Susceptibility artifact slightly limits  examination. Marrow signals show no concerning findings. Alignment is satisfactory. The disc at C5-6 shows osseous fusion features,. Remaining intervertebral  discs are desiccated diffusely. Spinal cord parenchyma imaged shows normal volume and signal, to the limits  imaged. Imaged portions of the posterior fossa parenchyma as well appear  acceptable. The foramen magnum is widely patent.     Minimal end plate osteophytes and bulging disc material at C2-3 results in  mild stenosis of the canal, producing near complete effacement of the  subarachnoid spaces ventrally but with no significant contour deformity of      Page 1 of 3              Print date/time:7/17/2014 5:23 PM        [facility]          Patient Name: Víctor Singh  Patient Type: [facility]  OP           MRN:  [id number]  Gender:  Female                   Account Number:  [de-identified]  [facility] Phys:  Louis Ann       Date of Birth: 1965  M.D. Pt Loc:  [de-identified] Outpt      R a d i o l o g y   R e p o r t    Accession Number:  Procedure Name:             Exam Date/Time:  [id number]      MRI Spine Cervical Canal WO 7/15/2014 4:18:00 PM  Contr    the spinal cord. Foramina are widely patent. Facets are minimally  degenerated. There is also end plate osteophyte centrally at the C3-4 level. Minimal  bulging disc material as well. Effacement of the subarachnoid spaces  anteriorly results, with minimal remodeling of the spinal cord contour  consistent with mild central canal stenosis. Very minimal foraminal  narrowing, right more than left. Right more than left facet degenerative  change is evident. C4-5 canal is mildly narrowed due to end plate osteophytes. Very minimal  bulging disc material.  Minimal if any foraminal stenosis. There is  incomplete effacement of the subarachnoid spaces anteriorly with minimal  flattening of the spinal cord contour ventrally. At C5-6, there is evidence of minimal canal stenosis related to end plate  osteophytic ridging. Very minimal left foraminal narrowing also. The  right foramen is widely patent. The facets show mild degenerative changes. C6-7 canal shows evidence of stenosis, with near complete effacement of the  subarachnoid spaces. Susceptibility artifact limits evaluation but this  appears as a result of both bulging disc material and end plate  osteophytes. Incomplete effacement of the subarachnoid spaces results. There is very minimal spinal cord remodeling. The foramina are very  minimally narrowed bilaterally. C7-T1 canal and foramina are grossly patent. Surrounding tissues show no concerning findings. Impression  Surgical fusion of C5-6. Susceptibility artifact limits  evaluation.   However, multi level degenerative disc changes are present  that result in mild stenosis of the canal at C3-4 and C4-5, more so at C6-      Page 2 of 3 Print date/time:7/17/2014 5:23 PM        [address] [facility] [facility]          Patient Name: Pita Gamboa  Patient Type: [facility]  OP           MRN:  [id number]  Gender:  Female                   Account Number:  [de-identified]  [facility] Phys:  Fei Rodriguez       YOB: 1965  M.D. Pt Loc:  [facility] Outpt      R a d i o l o g y   R e p o r t    Accession Number:  Procedure Name:             Exam Date/Time:  [id number]      MRI Spine Cervical Canal WO 7/15/2014 4:18:00 PM  Contr    7. Less impressive foraminal stenosis, compared to the canal stenosis. No  strong evidence of myelopathic change. **FINAL REPORT**  Dictated By :  Deloris Silva M.D. Certified Electronic Signature  [facility].  Dictated Date and Time:  15-JUL-2014 17:13  Transcribed By:  Hammad Momin  Transcribed Date and Time:  17-JUL-2014 13:24  Approved By:  Deloris Silva M.D. Approve Date and Time:  17-JUL-2014 17:23    Technologist:  Grazyna Hewitt  RT(R)(MR)      Page 3 of 3              Print date/time:7/17/2014 5:23 PM        Results for orders placed during the hospital encounter of 03/31/22    MRI BRAIN W WO CONTRAST    Narrative  PROCEDURE: [address]IN [address] INFORMATIONAbnormality of gait, Facial numbness. COMPARISON: No prior study. TECHNIQUE: Multiplanar and multiple spin echo T1 and T2-weighted images were obtained through the brain before and after the administration of intravenous contrast.    FINDINGS:      The diffusion-weighted images are normal. The brain volume is normal.There are no intra-or extra-axial collections. There is no hydrocephalus, midline shift or mass effect. On the FLAIR and T2-weighted sequences, there is a punctate areas of increased signal intensity in the white matter most likely representing ischemic changes. On the gradient echo T2-weighted images, there aren't no areas of susceptibility artifact noted.     There is no abnormal enhancement in the brain. The major intracranial vascular flow voids are present. The midline craniocervical junction structures are normal.  The brainstem and pituitary gland are normal.    There is diminished signal intensity in the bony calvarium and skull base. Total body bone scan may be helpful for better evaluation. Impression  1. Diminished signal intensity in the bone marrow within the calvarium and skull base. Total body bone scan may be helpful for better evaluation. 2. Punctate areas of increased signal intensity in the white matter most likely representing ischemic changes. 3. No evidence of an acute infarct. 4. Otherwise negative MRI scan of the brain with and without intravenous contrast..          **This report has been created using voice recognition software. It may contain minor errors which are inherent in voice recognition technology. **      Final report electronically signed by DR Dre Benz on 3/31/2022 1:59 PM    No results found for this or any previous visit. Results for orders placed during the hospital encounter of 05/06/22    CT HEAD WO CONTRAST    Narrative  CT head without contrast    Comparison: MR,SR - MRI BRAIN W WO CONTRAST - 03/31/2022 01:05 PM EDT    Findings:  No intra-axial mass, midline shift, hydrocephalus, or acute hemorrhage. Mild periventricular hypodensity may relate to chronic small vessel  ischemic changes. No significant atrophy-like change. There is no sinus or mastoid fluid. The orbits are unremarkable. There is no acute fracture. Impression  No acute intracranial findings. This document has been electronically signed by: Jami Malodnado MD on  05/06/2022 07:14 PM    All CTs at this facility use dose modulation techniques and iterative  reconstructions, and/or weight-based dosing  when appropriate to reduce radiation to a low as reasonably achievable.     Tilt table test done 9/13/22:  SUMMARY:  Negative tilt table examination to explain the etiology of the  fall. RECOMMENDATIONS:  Followup with the ordering provider for further  evaluation. Xiang Simental MD     30 day cardiac event monitor   SUMMARY:  Unremarkable event monitor with no symptoms recorded. Xiang Simental MD     EEG done 9/9/22:     IMPRESSION:  This is an abnormal EEG due to presence of occasional  bifrontal sharp waves that are of questionable significance though they  maybe epileptogenic in nature or indicate seizure tendency. No clinical  seizures were observed. Clinical correlation is recommended. DENISE Serrato MD        Assessment:     Diagnosis Orders   1. Frequent falls        2. Balance problem             Follow up for balance trouble. She is doing the same. She denies any new symptoms. No falls. She had tilt table test and 30 day cardiac event monitor that showed no concerning findings. Cervical spine x-ray done 9/25/22 showed no concerning findings. EEG done 9/9/22 was abnormal due to presence of occasional  bifrontal sharp waves that are of questionable significance though they maybe epileptogenic in nature or indicate seizure tendency. No clinical seizures were observed. Clinical correlation is recommended. Given lack of other explanation regarding her symptoms she was asked to start on keppra 500 mg two times a day. She denies any previous history of staring or seizure. No passing out. She is still experiencing symptoms despite taking the keppra. We recommended she follow up with neurology at higher level of care regarding her symptoms as testing done locally has been inconclusive. She will think about it and get back with us as to where she would like to go. After detailed discussion with patient we agreed on the following plan.          Plan:  Change Keppra to 250 mg two times a day for 1 week, then change to 250 mg daily for 1 week, then stop  Recommend patient be evaluated by neurology at higher level of care, patient will think about it, and get back with us when she is ready to pursue this. Follow up as  needed. Call if any questions or concerns.     Total time 26 min    TORO Tobar - CNP

## 2022-12-14 ENCOUNTER — TELEPHONE (OUTPATIENT)
Dept: NEUROLOGY | Age: 57
End: 2022-12-14

## 2022-12-14 NOTE — TELEPHONE ENCOUNTER
Continue with keppra at current dose, recommend evaluation with neurology at higher level of care  Teena Gamble, TL

## 2022-12-14 NOTE — TELEPHONE ENCOUNTER
Patient called stating she had episode last night of body shaking, arms went up in air, hands twisting, fingers stiff that woke her from sleep. She experienced this once in the past. She is taking Keppra 500 mg twice a day. She did not titrate Keppra down as discussed at last visit. She can forget to take the [address]. She has missed her Keppra the past 3 nights. Last note stated recommend patient be evaluated by neurology at higher level of care. Patient stated she is not interested in referral at this time. Patient is still driving. Please advise. Thank you.

## 2022-12-21 RX ORDER — BUSPIRONE HYDROCHLORIDE 15 MG/1
TABLET ORAL
Qty: 90 TABLET | Refills: 11 | Status: SHIPPED | OUTPATIENT
Start: 2022-12-21

## 2022-12-28 RX ORDER — MONTELUKAST SODIUM 10 MG/1
TABLET ORAL
Qty: 90 TABLET | Refills: 3 | Status: SHIPPED | OUTPATIENT
Start: 2022-12-28

## 2023-01-12 RX ORDER — LEVETIRACETAM 500 MG/1
TABLET ORAL
Qty: 240 TABLET | OUTPATIENT
Start: 2023-01-12

## 2023-01-17 ENCOUNTER — TELEPHONE (OUTPATIENT)
Dept: NEUROLOGY | Age: 58
End: 2023-01-17

## 2023-01-17 DIAGNOSIS — R29.6 FREQUENT FALLS: Primary | ICD-10-CM

## 2023-01-17 DIAGNOSIS — R26.89 BALANCE PROBLEM: ICD-10-CM

## 2023-01-17 DIAGNOSIS — R42 DIZZINESS: ICD-10-CM

## 2023-01-17 RX ORDER — OMEPRAZOLE 40 MG/1
40 CAPSULE, DELAYED RELEASE ORAL DAILY
Qty: 90 CAPSULE | Refills: 3 | Status: SHIPPED | OUTPATIENT
Start: 2023-01-17 | End: 2023-04-17

## 2023-01-17 NOTE — TELEPHONE ENCOUNTER
Patient called back requesting to proceed with referral to higher level of care as previously discussed. Patient is requesting referral to [facility] neurology. Please advise. Thank you.

## 2023-02-10 ENCOUNTER — TELEPHONE (OUTPATIENT)
Dept: FAMILY MEDICINE CLINIC | Age: 58
End: 2023-02-10

## 2023-02-10 RX ORDER — PERMETHRIN 50 MG/G
CREAM TOPICAL
Qty: 60 G | Refills: 0 | Status: SHIPPED | OUTPATIENT
Start: 2023-02-10

## 2023-02-10 NOTE — TELEPHONE ENCOUNTER
----- Message from Debi Chatterjee sent at 2/10/2023  9:58 AM EST -----  Subject: Message to Provider    QUESTIONS  Information for Provider? Patient stated that she got a new puppy and he   has been sleeping with her. She said he has fleas and she is all bit up   and itchy. Patient is asking that we call her in a cream or something for   these bites to [address] [place] [address], [address]   [phone] - f [phone]. Please contact the patient and advise or   let her know once the meds are called in.  ---------------------------------------------------------------------------  --------------  Stewart ARCE  [phone]; OK to leave message on voicemail  ---------------------------------------------------------------------------  --------------  SCRIPT ANSWERS  Relationship to Patient?  Self

## 2023-03-21 ENCOUNTER — OFFICE VISIT (OUTPATIENT)
Dept: FAMILY MEDICINE CLINIC | Age: 58
End: 2023-03-21
Payer: COMMERCIAL

## 2023-03-21 VITALS
SYSTOLIC BLOOD PRESSURE: 128 MMHG | WEIGHT: 201.2 LBS | DIASTOLIC BLOOD PRESSURE: 78 MMHG | HEART RATE: 78 BPM | BODY MASS INDEX: 39.5 KG/M2 | OXYGEN SATURATION: 96 % | HEIGHT: 60 IN

## 2023-03-21 DIAGNOSIS — F34.1 PERSISTENT DEPRESSIVE DISORDER: Primary | ICD-10-CM

## 2023-03-21 PROCEDURE — G8484 FLU IMMUNIZE NO ADMIN: HCPCS | Performed by: FAMILY MEDICINE

## 2023-03-21 PROCEDURE — 3074F SYST BP LT 130 MM HG: CPT | Performed by: FAMILY MEDICINE

## 2023-03-21 PROCEDURE — G8417 CALC BMI ABV UP PARAM F/U: HCPCS | Performed by: FAMILY MEDICINE

## 2023-03-21 PROCEDURE — 99213 OFFICE O/P EST LOW 20 MIN: CPT | Performed by: FAMILY MEDICINE

## 2023-03-21 PROCEDURE — 1036F TOBACCO NON-USER: CPT | Performed by: FAMILY MEDICINE

## 2023-03-21 PROCEDURE — 3078F DIAST BP <80 MM HG: CPT | Performed by: FAMILY MEDICINE

## 2023-03-21 PROCEDURE — 3017F COLORECTAL CA SCREEN DOC REV: CPT | Performed by: FAMILY MEDICINE

## 2023-03-21 PROCEDURE — G8427 DOCREV CUR MEDS BY ELIG CLIN: HCPCS | Performed by: FAMILY MEDICINE

## 2023-03-21 RX ORDER — ARIPIPRAZOLE 2 MG/1
2 TABLET ORAL DAILY
Qty: 30 TABLET | Refills: 3 | Status: SHIPPED | OUTPATIENT
Start: 2023-03-21

## 2023-03-21 SDOH — ECONOMIC STABILITY: FOOD INSECURITY: WITHIN THE PAST 12 MONTHS, THE FOOD YOU BOUGHT JUST DIDN'T LAST AND YOU DIDN'T HAVE MONEY TO GET MORE.: NEVER TRUE

## 2023-03-21 SDOH — ECONOMIC STABILITY: HOUSING INSECURITY
IN THE LAST 12 MONTHS, WAS THERE A TIME WHEN YOU DID NOT HAVE A STEADY PLACE TO SLEEP OR SLEPT IN A SHELTER (INCLUDING NOW)?: NO

## 2023-03-21 SDOH — ECONOMIC STABILITY: FOOD INSECURITY: WITHIN THE PAST 12 MONTHS, YOU WORRIED THAT YOUR FOOD WOULD RUN OUT BEFORE YOU GOT MONEY TO BUY MORE.: NEVER TRUE

## 2023-03-21 SDOH — ECONOMIC STABILITY: INCOME INSECURITY: HOW HARD IS IT FOR YOU TO PAY FOR THE VERY BASICS LIKE FOOD, HOUSING, MEDICAL CARE, AND HEATING?: NOT HARD AT ALL

## 2023-03-21 ASSESSMENT — ANXIETY QUESTIONNAIRES
5. BEING SO RESTLESS THAT IT IS HARD TO SIT STILL: 0
GAD7 TOTAL SCORE: 17
2. NOT BEING ABLE TO STOP OR CONTROL WORRYING: 3
3. WORRYING TOO MUCH ABOUT DIFFERENT THINGS: 3
6. BECOMING EASILY ANNOYED OR IRRITABLE: 3
IF YOU CHECKED OFF ANY PROBLEMS ON THIS QUESTIONNAIRE, HOW DIFFICULT HAVE THESE PROBLEMS MADE IT FOR YOU TO DO YOUR WORK, TAKE CARE OF THINGS AT HOME, OR GET ALONG WITH OTHER PEOPLE: VERY DIFFICULT
4. TROUBLE RELAXING: 2
7. FEELING AFRAID AS IF SOMETHING AWFUL MIGHT HAPPEN: 3
1. FEELING NERVOUS, ANXIOUS, OR ON EDGE: 3

## 2023-03-21 ASSESSMENT — PATIENT HEALTH QUESTIONNAIRE - PHQ9
8. MOVING OR SPEAKING SO SLOWLY THAT OTHER PEOPLE COULD HAVE NOTICED. OR THE OPPOSITE, BEING SO FIGETY OR RESTLESS THAT YOU HAVE BEEN MOVING AROUND A LOT MORE THAN USUAL: 0
4. FEELING TIRED OR HAVING LITTLE ENERGY: 3
5. POOR APPETITE OR OVEREATING: 0
9. THOUGHTS THAT YOU WOULD BE BETTER OFF DEAD, OR OF HURTING YOURSELF: 0
SUM OF ALL RESPONSES TO PHQ QUESTIONS 1-9: 13
SUM OF ALL RESPONSES TO PHQ QUESTIONS 1-9: 13
7. TROUBLE CONCENTRATING ON THINGS, SUCH AS READING THE NEWSPAPER OR WATCHING TELEVISION: 3
3. TROUBLE FALLING OR STAYING ASLEEP: 3
10. IF YOU CHECKED OFF ANY PROBLEMS, HOW DIFFICULT HAVE THESE PROBLEMS MADE IT FOR YOU TO DO YOUR WORK, TAKE CARE OF THINGS AT HOME, OR GET ALONG WITH OTHER PEOPLE: 2
SUM OF ALL RESPONSES TO PHQ QUESTIONS 1-9: 13
2. FEELING DOWN, DEPRESSED OR HOPELESS: 3
SUM OF ALL RESPONSES TO PHQ QUESTIONS 1-9: 13
6. FEELING BAD ABOUT YOURSELF - OR THAT YOU ARE A FAILURE OR HAVE LET YOURSELF OR YOUR FAMILY DOWN: 1

## 2023-03-21 NOTE — PROGRESS NOTES
[address] [place] [address]  Dept: [phone]     SUBJECTIVE     Karina Braun is a 59 y. o.female    Pt presents for follow up depression and anxiety. Pt feeling ok since last visit- interval history and any new issues noted below:     Progressively worsening depression, anhedonia, fatigue over last several months. Sleeping most of the time when not at work. Not taking care of household chores. Isolating from family. Stopped her meds several days ago. No SI reported during visit.     Patient Active Problem List   Diagnosis    Anxiety    Depression    OCD (obsessive compulsive disorder)    Essential hypertension    Other constipation    Adnexal mass    S/P exploratory laparotomy       Current Outpatient Medications   Medication Sig Dispense Refill    ARIPiprazole (ABILIFY) 2 MG tablet Take 1 tablet by mouth daily 30 tablet 3    omeprazole (PRILOSEC) 40 MG delayed release capsule Take 1 capsule by mouth daily 90 capsule 3    montelukast (SINGULAIR) 10 MG tablet take 1 tablet by mouth every evening 90 tablet 3    busPIRone (BUSPAR) 15 MG tablet take 1 tablet by mouth three times a day 90 tablet 11    levETIRAcetam (KEPPRA) 500 MG tablet Take 1 tablet by mouth 2 times daily 60 tablet 3    ASPIRIN LOW DOSE 81 MG EC tablet Take 81 mg by mouth daily      metFORMIN (GLUCOPHAGE) 500 MG tablet Take 500 mg by mouth in the morning and at bedtime      amLODIPine (NORVASC) 10 MG tablet Take 10 mg by mouth daily      atorvastatin (LIPITOR) 20 MG tablet Take 20 mg by mouth daily Takes 1.5 tablet      escitalopram (LEXAPRO) 20 MG tablet Take 20 mg by mouth daily      metoprolol succinate (TOPROL XL) 25 MG extended release tablet Take 1 tablet by mouth daily 30 tablet 11    cyclobenzaprine (FLEXERIL) 10 MG tablet take 1/2 TO 1 tablet by mouth at bedtime if needed for muscle spasm (Patient not taking: Reported on 3/21/2023) 30 tablet 1    hydroCHLOROthiazide
06/24/2022    CO2 26 06/24/2022    TSH 0.963 03/31/2022    INR 1.33 (H) 11/01/2019    LABA1C 6.4 03/31/2022       /Plan:   1. Persistent depressive disorder  At this time, given the increase in depression symptoms, will start on Ability 2 mg daily. Spoke to patient extensively about considering going to therapy and patient states she will think about it. Patient is to follow up in 2 weeks.   - ARIPiprazole (ABILIFY) 2 MG tablet; Take 1 tablet by mouth daily  Dispense: 30 tablet; Refill: 3      Return in about 2 weeks (around 4/4/2023).    Orders Placed   No orders of the defined types were placed in this encounter.      Prescriptions given/sent  Orders Placed This Encounter   Medications    ARIPiprazole (ABILIFY) 2 MG tablet     Sig: Take 1 tablet by mouth daily     Dispense:  30 tablet     Refill:  3         Patient given educational materials - see patient instructions.  Discussed use, benefit, and side effects of recommended medications.  All patient questions answered.  Pt voiced understanding.  Reviewed health maintenance.            On this date 3/21/2023 I have spent 30 minutes reviewing previous notes, test results and face to face with the patient discussing the diagnosis and importance of compliance with the treatment plan as well as documenting on the day of the visit.  Electronically signed by Sonia Conroy DO on 3/21/2023 at 10:29 AM

## 2023-04-14 PROBLEM — G62.9 PERIPHERAL POLYNEUROPATHY: Status: ACTIVE | Noted: 2023-04-14

## 2023-04-25 RX ORDER — ASPIRIN 81 MG/1
TABLET, COATED ORAL
Qty: 90 TABLET | Refills: 3 | Status: SHIPPED | OUTPATIENT
Start: 2023-04-25

## 2023-04-25 RX ORDER — AMLODIPINE BESYLATE 10 MG/1
TABLET ORAL
Qty: 90 TABLET | Refills: 3 | Status: SHIPPED | OUTPATIENT
Start: 2023-04-25

## 2023-04-25 RX ORDER — ATORVASTATIN CALCIUM 20 MG/1
TABLET, FILM COATED ORAL
Qty: 90 TABLET | Refills: 3 | Status: SHIPPED | OUTPATIENT
Start: 2023-04-25

## 2023-04-25 NOTE — TELEPHONE ENCOUNTER
Patient's last appointment was : 4/14/2023  Patient's next appointment is : 5/19/2023  Last sent in: not found

## 2023-05-09 ENCOUNTER — PROCEDURE VISIT (OUTPATIENT)
Dept: NEUROLOGY | Age: 58
End: 2023-05-09
Payer: COMMERCIAL

## 2023-05-09 DIAGNOSIS — M54.16 LUMBAR RADICULAR PAIN: ICD-10-CM

## 2023-05-09 DIAGNOSIS — M79.604 RIGHT LEG PAIN: ICD-10-CM

## 2023-05-09 DIAGNOSIS — M54.16 LUMBAR RADICULOPATHY: Primary | ICD-10-CM

## 2023-05-09 PROCEDURE — 95886 MUSC TEST DONE W/N TEST COMP: CPT | Performed by: PSYCHIATRY & NEUROLOGY

## 2023-05-09 PROCEDURE — 95910 NRV CNDJ TEST 7-8 STUDIES: CPT | Performed by: PSYCHIATRY & NEUROLOGY

## 2023-05-23 ENCOUNTER — OFFICE VISIT (OUTPATIENT)
Dept: FAMILY MEDICINE CLINIC | Age: 58
End: 2023-05-23
Payer: COMMERCIAL

## 2023-05-23 VITALS
RESPIRATION RATE: 16 BRPM | BODY MASS INDEX: 40.25 KG/M2 | HEART RATE: 75 BPM | SYSTOLIC BLOOD PRESSURE: 122 MMHG | OXYGEN SATURATION: 96 % | DIASTOLIC BLOOD PRESSURE: 62 MMHG | HEIGHT: 60 IN | WEIGHT: 205 LBS

## 2023-05-23 DIAGNOSIS — I10 ESSENTIAL HYPERTENSION: ICD-10-CM

## 2023-05-23 DIAGNOSIS — Z00.00 WELL ADULT EXAM: ICD-10-CM

## 2023-05-23 DIAGNOSIS — F34.1 PERSISTENT DEPRESSIVE DISORDER: ICD-10-CM

## 2023-05-23 DIAGNOSIS — R45.1 AGITATION: ICD-10-CM

## 2023-05-23 DIAGNOSIS — F41.9 ANXIETY: ICD-10-CM

## 2023-05-23 DIAGNOSIS — M54.16 RIGHT LUMBAR RADICULITIS: Primary | ICD-10-CM

## 2023-05-23 PROCEDURE — 1036F TOBACCO NON-USER: CPT | Performed by: FAMILY MEDICINE

## 2023-05-23 PROCEDURE — 3074F SYST BP LT 130 MM HG: CPT | Performed by: FAMILY MEDICINE

## 2023-05-23 PROCEDURE — 3078F DIAST BP <80 MM HG: CPT | Performed by: FAMILY MEDICINE

## 2023-05-23 PROCEDURE — 99214 OFFICE O/P EST MOD 30 MIN: CPT | Performed by: FAMILY MEDICINE

## 2023-05-23 PROCEDURE — G8427 DOCREV CUR MEDS BY ELIG CLIN: HCPCS | Performed by: FAMILY MEDICINE

## 2023-05-23 PROCEDURE — 3017F COLORECTAL CA SCREEN DOC REV: CPT | Performed by: FAMILY MEDICINE

## 2023-05-23 PROCEDURE — G8417 CALC BMI ABV UP PARAM F/U: HCPCS | Performed by: FAMILY MEDICINE

## 2023-05-23 RX ORDER — ARIPIPRAZOLE 5 MG/1
2.5 TABLET ORAL DAILY
Qty: 30 TABLET | Refills: 3
Start: 2023-05-23

## 2023-05-23 RX ORDER — OLOPATADINE HYDROCHLORIDE 2 MG/ML
1 SOLUTION/ DROPS OPHTHALMIC DAILY
Qty: 2.5 ML | Refills: 3 | Status: SHIPPED | OUTPATIENT
Start: 2023-05-23

## 2023-05-23 ASSESSMENT — PATIENT HEALTH QUESTIONNAIRE - PHQ9
2. FEELING DOWN, DEPRESSED OR HOPELESS: 0
6. FEELING BAD ABOUT YOURSELF - OR THAT YOU ARE A FAILURE OR HAVE LET YOURSELF OR YOUR FAMILY DOWN: 0
SUM OF ALL RESPONSES TO PHQ QUESTIONS 1-9: 9
SUM OF ALL RESPONSES TO PHQ QUESTIONS 1-9: 9
7. TROUBLE CONCENTRATING ON THINGS, SUCH AS READING THE NEWSPAPER OR WATCHING TELEVISION: 3
9. THOUGHTS THAT YOU WOULD BE BETTER OFF DEAD, OR OF HURTING YOURSELF: 0
10. IF YOU CHECKED OFF ANY PROBLEMS, HOW DIFFICULT HAVE THESE PROBLEMS MADE IT FOR YOU TO DO YOUR WORK, TAKE CARE OF THINGS AT HOME, OR GET ALONG WITH OTHER PEOPLE: 1
SUM OF ALL RESPONSES TO PHQ QUESTIONS 1-9: 9
1. LITTLE INTEREST OR PLEASURE IN DOING THINGS: 1
SUM OF ALL RESPONSES TO PHQ QUESTIONS 1-9: 9
8. MOVING OR SPEAKING SO SLOWLY THAT OTHER PEOPLE COULD HAVE NOTICED. OR THE OPPOSITE, BEING SO FIGETY OR RESTLESS THAT YOU HAVE BEEN MOVING AROUND A LOT MORE THAN USUAL: 3
SUM OF ALL RESPONSES TO PHQ9 QUESTIONS 1 & 2: 1
4. FEELING TIRED OR HAVING LITTLE ENERGY: 1
3. TROUBLE FALLING OR STAYING ASLEEP: 1
5. POOR APPETITE OR OVEREATING: 0

## 2023-05-30 ASSESSMENT — ENCOUNTER SYMPTOMS
TROUBLE SWALLOWING: 0
SHORTNESS OF BREATH: 0
DIARRHEA: 0
COUGH: 0
CONSTIPATION: 0
ABDOMINAL PAIN: 0
VOMITING: 0
SORE THROAT: 0

## 2023-06-20 ENCOUNTER — OFFICE VISIT (OUTPATIENT)
Dept: FAMILY MEDICINE CLINIC | Age: 58
End: 2023-06-20
Payer: COMMERCIAL

## 2023-06-20 VITALS
HEART RATE: 74 BPM | DIASTOLIC BLOOD PRESSURE: 66 MMHG | WEIGHT: 205.2 LBS | SYSTOLIC BLOOD PRESSURE: 126 MMHG | HEIGHT: 60 IN | BODY MASS INDEX: 40.29 KG/M2 | OXYGEN SATURATION: 97 %

## 2023-06-20 DIAGNOSIS — I10 ESSENTIAL HYPERTENSION: ICD-10-CM

## 2023-06-20 DIAGNOSIS — F34.1 PERSISTENT DEPRESSIVE DISORDER: ICD-10-CM

## 2023-06-20 DIAGNOSIS — M79.89 BILATERAL SWELLING OF FEET: ICD-10-CM

## 2023-06-20 DIAGNOSIS — F41.9 ANXIETY: Primary | ICD-10-CM

## 2023-06-20 PROCEDURE — G8417 CALC BMI ABV UP PARAM F/U: HCPCS | Performed by: FAMILY MEDICINE

## 2023-06-20 PROCEDURE — G8427 DOCREV CUR MEDS BY ELIG CLIN: HCPCS | Performed by: FAMILY MEDICINE

## 2023-06-20 PROCEDURE — 3074F SYST BP LT 130 MM HG: CPT | Performed by: FAMILY MEDICINE

## 2023-06-20 PROCEDURE — 3017F COLORECTAL CA SCREEN DOC REV: CPT | Performed by: FAMILY MEDICINE

## 2023-06-20 PROCEDURE — 3078F DIAST BP <80 MM HG: CPT | Performed by: FAMILY MEDICINE

## 2023-06-20 PROCEDURE — 1036F TOBACCO NON-USER: CPT | Performed by: FAMILY MEDICINE

## 2023-06-20 PROCEDURE — 99214 OFFICE O/P EST MOD 30 MIN: CPT | Performed by: FAMILY MEDICINE

## 2023-06-20 RX ORDER — HYDROCHLOROTHIAZIDE 25 MG/1
TABLET ORAL
Qty: 30 TABLET | Refills: 11 | Status: SHIPPED | OUTPATIENT
Start: 2023-06-20

## 2023-06-20 RX ORDER — ESCITALOPRAM OXALATE 20 MG/1
30 TABLET ORAL DAILY
Qty: 135 TABLET | Refills: 3 | Status: SHIPPED | OUTPATIENT
Start: 2023-06-20 | End: 2024-06-19

## 2023-06-20 ASSESSMENT — PATIENT HEALTH QUESTIONNAIRE - PHQ9
SUM OF ALL RESPONSES TO PHQ9 QUESTIONS 1 & 2: 0
2. FEELING DOWN, DEPRESSED OR HOPELESS: 0
1. LITTLE INTEREST OR PLEASURE IN DOING THINGS: 0
SUM OF ALL RESPONSES TO PHQ QUESTIONS 1-9: 0

## 2023-06-20 ASSESSMENT — ANXIETY QUESTIONNAIRES
6. BECOMING EASILY ANNOYED OR IRRITABLE: 3
GAD7 TOTAL SCORE: 8
IF YOU CHECKED OFF ANY PROBLEMS ON THIS QUESTIONNAIRE, HOW DIFFICULT HAVE THESE PROBLEMS MADE IT FOR YOU TO DO YOUR WORK, TAKE CARE OF THINGS AT HOME, OR GET ALONG WITH OTHER PEOPLE: NOT DIFFICULT AT ALL
7. FEELING AFRAID AS IF SOMETHING AWFUL MIGHT HAPPEN: 0
3. WORRYING TOO MUCH ABOUT DIFFERENT THINGS: 1
4. TROUBLE RELAXING: 1
5. BEING SO RESTLESS THAT IT IS HARD TO SIT STILL: 1
2. NOT BEING ABLE TO STOP OR CONTROL WORRYING: 1
1. FEELING NERVOUS, ANXIOUS, OR ON EDGE: 1

## 2023-06-20 ASSESSMENT — ENCOUNTER SYMPTOMS
ABDOMINAL PAIN: 0
SHORTNESS OF BREATH: 0
CONSTIPATION: 0
DIARRHEA: 0
TROUBLE SWALLOWING: 0
COUGH: 0
SORE THROAT: 0
VOMITING: 0

## 2023-06-20 NOTE — TELEPHONE ENCOUNTER
Patient's last appointment was : 5/23/2023  Patient's next appointment is : 6/20/2023  Last sent in: 6/24/22 30 tabs with 11 refills

## 2023-06-20 NOTE — PROGRESS NOTES
PFIZER PURPLE top, DILUTE for use, (age 15 y+), 30mcg/0.3mL 03/17/2021, 04/07/2021, 12/23/2021    Influenza Virus Vaccine 10/27/2010, 10/19/2011, 10/29/2012, 10/22/2013, 10/14/2014    Influenza Whole 09/16/2009    Influenza, FLUARIX, FLULAVAL, Estle Hazard (age 10 mo+) AND AFLURIA, (age 1 y+), PF, 0.5mL 10/20/2015, 10/11/2016, 10/25/2017, 10/16/2018, 10/23/2019, 10/28/2020    TDaP, ADACEL (age 10y-63y), BOOSTRIX (age 10y+), IM, 0.5mL 08/04/2016       Health Maintenance   Topic Date Due    Lipids  Never done    HIV screen  Never done    Hepatitis C screen  Never done    Colorectal Cancer Screen  Never done    Breast cancer screen  Never done    Shingles vaccine (1 of 2) Never done    COVID-19 Vaccine (4 - Booster for Pfizer series) 02/17/2022    A1C test (Diabetic or Prediabetic)  03/31/2023    Flu vaccine (Season Ended) 08/01/2023    Depression Monitoring  06/20/2024    DTaP/Tdap/Td vaccine (2 - Td or Tdap) 08/04/2026    Hepatitis A vaccine  Aged Out    Hib vaccine  Aged Out    Meningococcal (ACWY) vaccine  Aged Out    Pneumococcal 0-64 years Vaccine  Aged Out    Diabetes screen  Discontinued       Future Appointments   Date Time Provider Aamir Owens   7/10/2023 10:00 AM TORO Mckenzie - CNP N [facility] [place] [place]   12/22/2023 11:00 AM Serjio Samayoa MD [address] [facility] [place]         ASSESSMENT       Diagnosis Orders   1. Anxiety        2. Persistent depressive disorder        3. Essential hypertension            PLAN      1. Anxiety  Doing well presently with abilify 2.5 mg daily and lexapro 20 mg daily, buspirone 15mg TID    2. Persistent depressive disorder  Stable on meds     3.  Essential hypertension  At goal with HCTZ 25 mg daily and Toprol XL 25 mg daily         Follow up 6 months      Serjio Samayoa MD  3:37 PM  6/21/23

## 2023-06-23 ENCOUNTER — HOSPITAL ENCOUNTER (OUTPATIENT)
Age: 58
End: 2023-06-23

## 2023-06-23 ENCOUNTER — NURSE ONLY (OUTPATIENT)
Dept: LAB | Age: 58
End: 2023-06-23

## 2023-06-23 DIAGNOSIS — R45.1 AGITATION: ICD-10-CM

## 2023-06-23 DIAGNOSIS — I10 ESSENTIAL HYPERTENSION: ICD-10-CM

## 2023-06-23 DIAGNOSIS — Z00.00 WELL ADULT EXAM: ICD-10-CM

## 2023-06-23 LAB
ALBUMIN SERPL BCG-MCNC: 3.9 G/DL (ref 3.5–5.1)
ALP SERPL-CCNC: 187 U/L (ref 38–126)
ALT SERPL W/O P-5'-P-CCNC: 58 U/L (ref 11–66)
ANION GAP SERPL CALC-SCNC: 14 MEQ/L (ref 8–16)
AST SERPL-CCNC: 67 U/L (ref 5–40)
BASOPHILS ABSOLUTE: 0.1 THOU/MM3 (ref 0–0.1)
BASOPHILS NFR BLD AUTO: 1 %
BILIRUB SERPL-MCNC: 0.6 MG/DL (ref 0.3–1.2)
BUN SERPL-MCNC: 11 MG/DL (ref 7–22)
CALCIUM SERPL-MCNC: 9.3 MG/DL (ref 8.5–10.5)
CHLORIDE SERPL-SCNC: 96 MEQ/L (ref 98–111)
CHOLEST SERPL-MCNC: 149 MG/DL (ref 100–199)
CO2 SERPL-SCNC: 29 MEQ/L (ref 23–33)
CREAT SERPL-MCNC: 0.7 MG/DL (ref 0.4–1.2)
DEPRECATED RDW RBC AUTO: 47.6 FL (ref 35–45)
EOSINOPHIL NFR BLD AUTO: 1.4 %
EOSINOPHILS ABSOLUTE: 0.1 THOU/MM3 (ref 0–0.4)
ERYTHROCYTE [DISTWIDTH] IN BLOOD BY AUTOMATED COUNT: 14 % (ref 11.5–14.5)
GFR SERPL CREATININE-BSD FRML MDRD: > 60 ML/MIN/1.73M2
GLUCOSE SERPL-MCNC: 118 MG/DL (ref 70–108)
HCT VFR BLD AUTO: 42.8 % (ref 37–47)
HDLC SERPL-MCNC: 44 MG/DL
HGB BLD-MCNC: 14 GM/DL (ref 12–16)
IMM GRANULOCYTES # BLD AUTO: 0.02 THOU/MM3 (ref 0–0.07)
IMM GRANULOCYTES NFR BLD AUTO: 0.3 %
LDLC SERPL CALC-MCNC: 83 MG/DL
LYMPHOCYTES ABSOLUTE: 2.2 THOU/MM3 (ref 1–4.8)
LYMPHOCYTES NFR BLD AUTO: 30.3 %
MCH RBC QN AUTO: 30.4 PG (ref 26–33)
MCHC RBC AUTO-ENTMCNC: 32.7 GM/DL (ref 32.2–35.5)
MCV RBC AUTO: 93 FL (ref 81–99)
MONOCYTES ABSOLUTE: 0.5 THOU/MM3 (ref 0.4–1.3)
MONOCYTES NFR BLD AUTO: 6.3 %
NEUTROPHILS NFR BLD AUTO: 60.7 %
NRBC BLD AUTO-RTO: 0 /100 WBC
PLATELET # BLD AUTO: 174 THOU/MM3 (ref 130–400)
PMV BLD AUTO: 11.2 FL (ref 9.4–12.4)
POTASSIUM SERPL-SCNC: 3.9 MEQ/L (ref 3.5–5.2)
PROT SERPL-MCNC: 7.5 G/DL (ref 6.1–8)
RBC # BLD AUTO: 4.6 MILL/MM3 (ref 4.2–5.4)
SEGMENTED NEUTROPHILS ABSOLUTE COUNT: 4.4 THOU/MM3 (ref 1.8–7.7)
SODIUM SERPL-SCNC: 139 MEQ/L (ref 135–145)
T4 FREE SERPL-MCNC: 1.35 NG/DL (ref 0.93–1.76)
TRIGL SERPL-MCNC: 111 MG/DL (ref 0–199)
TSH SERPL DL<=0.005 MIU/L-ACNC: 0.99 UIU/ML (ref 0.4–4.2)
WBC # BLD AUTO: 7.3 THOU/MM3 (ref 4.8–10.8)

## 2023-06-24 LAB
DEPRECATED MEAN GLUCOSE BLD GHB EST-ACNC: 138 MG/DL (ref 70–126)
HBA1C MFR BLD HPLC: 6.6 % (ref 4.4–6.4)

## 2023-06-27 ENCOUNTER — TELEPHONE (OUTPATIENT)
Dept: FAMILY MEDICINE CLINIC | Age: 58
End: 2023-06-27

## 2023-06-27 DIAGNOSIS — R74.8 ELEVATED ALKALINE PHOSPHATASE MEASUREMENT: ICD-10-CM

## 2023-06-27 DIAGNOSIS — R74.01 TRANSAMINITIS: Primary | ICD-10-CM

## 2023-07-03 ENCOUNTER — NURSE ONLY (OUTPATIENT)
Dept: LAB | Age: 58
End: 2023-07-03

## 2023-07-03 DIAGNOSIS — R74.01 TRANSAMINITIS: ICD-10-CM

## 2023-07-03 DIAGNOSIS — R74.8 ELEVATED ALKALINE PHOSPHATASE MEASUREMENT: ICD-10-CM

## 2023-07-03 LAB
ALT SERPL W/O P-5'-P-CCNC: 64 U/L (ref 11–66)
AST SERPL-CCNC: 91 U/L (ref 5–40)

## 2023-07-04 LAB — GGT SERPL-CCNC: 150 U/L (ref 8–69)

## 2023-07-05 DIAGNOSIS — R74.01 TRANSAMINITIS: ICD-10-CM

## 2023-07-05 DIAGNOSIS — K75.81 NASH (NONALCOHOLIC STEATOHEPATITIS): Primary | ICD-10-CM

## 2023-07-06 ENCOUNTER — TELEPHONE (OUTPATIENT)
Dept: FAMILY MEDICINE CLINIC | Age: 58
End: 2023-07-06

## 2023-07-06 DIAGNOSIS — R74.8 ELEVATED LIVER ENZYMES: Primary | ICD-10-CM

## 2023-07-06 NOTE — TELEPHONE ENCOUNTER
Called patient- no answer- left message for patient to return call. .     Was able to add on Fibroscan to the appointment she already had on 7/14/23  Nothing to eat for drink 8 hours prior to appointment.

## 2023-07-06 NOTE — TELEPHONE ENCOUNTER
----- Message from Howard Singh MD sent at 7/5/2023  3:37 PM EDT -----  Repeat labs indicate liver continues to have elevation in some of the enzymes- US was ordered but wish to add fibroscan to evaluate for progression to cirrhosis. She also may need to follow up with GI- has she [facility] the [facility] [place]- if no will get her set up with Dr. Jaimie Pham office.   Also need to check status of Hepatitis B and C vaccination

## 2023-07-06 NOTE — TELEPHONE ENCOUNTER
Spoke with patient- she states that she is unsure if she has ever had Hepatitis vaccines. She has not seen GI in past 3 years and would like to go to [facility]. . do you want that referral placed now or after scans return ?

## 2023-07-06 NOTE — TELEPHONE ENCOUNTER
This is Dr. Lizett Sepulveda answering for Dr. Georgia Beard as she is not in the office today. Recommend Hepatitis A and Hepatitis B vaccine series - she can get at health dept or pharmacy. Go ahead with the referral to Dr. Burnett/GI - dx elevated liver enzymes. Thanks! no

## 2023-07-08 LAB — ALKALINE PHOSPHATASE ISOENZYMES: NORMAL

## 2023-07-10 ENCOUNTER — OFFICE VISIT (OUTPATIENT)
Dept: PHYSICAL MEDICINE AND REHAB | Age: 58
End: 2023-07-10
Payer: COMMERCIAL

## 2023-07-10 ENCOUNTER — TELEPHONE (OUTPATIENT)
Dept: FAMILY MEDICINE CLINIC | Age: 58
End: 2023-07-10

## 2023-07-10 VITALS
HEART RATE: 72 BPM | SYSTOLIC BLOOD PRESSURE: 122 MMHG | DIASTOLIC BLOOD PRESSURE: 76 MMHG | WEIGHT: 205 LBS | OXYGEN SATURATION: 97 % | BODY MASS INDEX: 40.04 KG/M2

## 2023-07-10 DIAGNOSIS — G89.4 CHRONIC PAIN SYNDROME: ICD-10-CM

## 2023-07-10 DIAGNOSIS — M46.1 SI (SACROILIAC) JOINT INFLAMMATION (HCC): ICD-10-CM

## 2023-07-10 DIAGNOSIS — R74.8 ELEVATED ALKALINE PHOSPHATASE LEVEL: Primary | ICD-10-CM

## 2023-07-10 DIAGNOSIS — M54.16 LUMBAR RADICULITIS: Primary | ICD-10-CM

## 2023-07-10 DIAGNOSIS — M48.061 SPINAL STENOSIS OF LUMBAR REGION, UNSPECIFIED WHETHER NEUROGENIC CLAUDICATION PRESENT: ICD-10-CM

## 2023-07-10 DIAGNOSIS — M47.816 LUMBAR SPONDYLOSIS: ICD-10-CM

## 2023-07-10 PROCEDURE — G8417 CALC BMI ABV UP PARAM F/U: HCPCS | Performed by: NURSE PRACTITIONER

## 2023-07-10 PROCEDURE — 3078F DIAST BP <80 MM HG: CPT | Performed by: NURSE PRACTITIONER

## 2023-07-10 PROCEDURE — 99204 OFFICE O/P NEW MOD 45 MIN: CPT | Performed by: NURSE PRACTITIONER

## 2023-07-10 PROCEDURE — G8427 DOCREV CUR MEDS BY ELIG CLIN: HCPCS | Performed by: NURSE PRACTITIONER

## 2023-07-10 PROCEDURE — 3074F SYST BP LT 130 MM HG: CPT | Performed by: NURSE PRACTITIONER

## 2023-07-10 ASSESSMENT — ENCOUNTER SYMPTOMS: BACK PAIN: 1

## 2023-07-10 NOTE — PROGRESS NOTES
HPI:     ChiefComplaint: Lumbar back pain, Right leg pain, Left leg pain, and SI pain    New pt here for low back pain. She denies any prior lumbar surgery or injections. She has not seen a spine surgeon for this lumbar pain and RLE radicular pain. 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 No falls trauma or MVC in past.  She has had some PT within last 6 months  no relief per pt. She had EMG BLE and Lumbar CT and MRI 2022. NSAIDS cause her stomach issues. She  uses Tylenol, rest, massage and Chiropractor. To try to manage her pain. She c/o low back pain for many years 10 plus. The pain is mostly aching axial bilateral but most;ly  right sided has become worse over past 6-12 months. She has been  having some balance issues. She follows local Neurology and most recently was referred to [facility]  for higher level of care workup. She does have h/o Cervical  Fusion and a prior cervical surgery at [facility] 10 years ago with Dr Yen Ramos. She has constant low back  axial pain mostly aching 40%  that becomes sharp stabbing  at times then radiates into right SI right hip down leg to ankle 60% . She also c/o she has RLS. It is hard for her to sleep. She had RLE weakness with numbness tingling down into ankle. No s/s into foot or toes. She does not use assistive devices for ambulation. She does walk with a slight limp. She has some foot drag at times. She c/o  her RLE  is her main pain complaint at this time, affects her day to day life activities and she is unable to sleep or sit still. Patient pain increases with lifting, stairs, sitting, getting up and down, laying, and housework or working at job.   Treatments tried Chiropractor, muscle relaxer, and massage or TPI  Pain description stabbing/jabbing, aching, numbness/tingling, and pins and needles      Alleviating Factors:Tylenol rest   Any leg weakness yes RLE,  any saddle paresthesia, bowel or bladder incontinence yes

## 2023-07-10 NOTE — TELEPHONE ENCOUNTER
----- Message from Serena Johnson MD sent at 7/8/2023 11:47 AM EDT -----  Notify pt-   Results reviewed in Dr. Agustín campos. Alk phos isoenzymes reveal both elevated liver and bone component. It appears patient has liver ultrasound, FibroScan, and referral to Dr. Narendra Baumann pending. Has patient had any recent fractures or bony trauma? May need to consider whole-body bone scan for further evaluation (not DEXA scan). Please let me know.   ES

## 2023-07-11 NOTE — TELEPHONE ENCOUNTER
Patient notified of results. Patient has US and Scan scheduled for this Friday.    Patient stated no recent fractures or bone trauma  Patient agrees to whole body scan, stated \"whatever Dr.Leopold recommends\"

## 2023-07-11 NOTE — TELEPHONE ENCOUNTER
Discussion noted. Would recommend whole-body bone scan as mentioned previously. Test ordered. Please let patient know. Thanks.   SUJATHA

## 2023-07-14 ENCOUNTER — HOSPITAL ENCOUNTER (OUTPATIENT)
Dept: ULTRASOUND IMAGING | Age: 58
Discharge: HOME | End: 2023-07-14
Payer: COMMERCIAL

## 2023-07-14 DIAGNOSIS — R74.01 TRANSAMINITIS: ICD-10-CM

## 2023-07-14 DIAGNOSIS — R74.8 ELEVATED ALKALINE PHOSPHATASE MEASUREMENT: ICD-10-CM

## 2023-07-14 DIAGNOSIS — K75.81 NASH (NONALCOHOLIC STEATOHEPATITIS): ICD-10-CM

## 2023-07-14 PROCEDURE — 76981 USE PARENCHYMA: CPT

## 2023-07-14 PROCEDURE — 76705 ECHO EXAM OF ABDOMEN: CPT

## 2023-07-24 ENCOUNTER — HOSPITAL ENCOUNTER (OUTPATIENT)
Dept: NUCLEAR MEDICINE | Age: 58
Discharge: HOME | End: 2023-07-24
Attending: FAMILY MEDICINE
Payer: COMMERCIAL

## 2023-07-24 DIAGNOSIS — R74.8 ELEVATED ALKALINE PHOSPHATASE LEVEL: ICD-10-CM

## 2023-07-24 PROCEDURE — 78306 BONE IMAGING WHOLE BODY: CPT | Performed by: FAMILY MEDICINE

## 2023-07-24 PROCEDURE — A9503 TC99M MEDRONATE: HCPCS | Performed by: FAMILY MEDICINE

## 2023-07-24 PROCEDURE — 3430000000 HC RX DIAGNOSTIC RADIOPHARMACEUTICAL: Performed by: FAMILY MEDICINE

## 2023-07-24 RX ORDER — TC 99M MEDRONATE 20 MG/10ML
26.2 INJECTION, POWDER, LYOPHILIZED, FOR SOLUTION INTRAVENOUS
Status: COMPLETED | OUTPATIENT
Start: 2023-07-24 | End: 2023-07-24

## 2023-07-24 RX ADMIN — TC 99M MEDRONATE 26.2 MILLICURIE: 20 INJECTION, POWDER, LYOPHILIZED, FOR SOLUTION INTRAVENOUS at 08:55

## 2023-08-04 ENCOUNTER — TELEPHONE (OUTPATIENT)
Dept: FAMILY MEDICINE CLINIC | Age: 58
End: 2023-08-04

## 2023-08-04 ENCOUNTER — OFFICE VISIT (OUTPATIENT)
Dept: FAMILY MEDICINE CLINIC | Age: 58
End: 2023-08-04
Payer: COMMERCIAL

## 2023-08-04 VITALS
HEART RATE: 81 BPM | WEIGHT: 205.8 LBS | SYSTOLIC BLOOD PRESSURE: 118 MMHG | HEIGHT: 60 IN | BODY MASS INDEX: 40.4 KG/M2 | OXYGEN SATURATION: 98 % | DIASTOLIC BLOOD PRESSURE: 68 MMHG

## 2023-08-04 DIAGNOSIS — K75.81 NASH (NONALCOHOLIC STEATOHEPATITIS): Primary | ICD-10-CM

## 2023-08-04 DIAGNOSIS — I10 ESSENTIAL HYPERTENSION: ICD-10-CM

## 2023-08-04 DIAGNOSIS — E11.69 TYPE 2 DIABETES MELLITUS WITH OTHER SPECIFIED COMPLICATION, WITH LONG-TERM CURRENT USE OF INSULIN (HCC): ICD-10-CM

## 2023-08-04 DIAGNOSIS — T14.8XXA SKIN ABRASION: ICD-10-CM

## 2023-08-04 DIAGNOSIS — Z79.4 TYPE 2 DIABETES MELLITUS WITH OTHER SPECIFIED COMPLICATION, WITH LONG-TERM CURRENT USE OF INSULIN (HCC): ICD-10-CM

## 2023-08-04 PROBLEM — E11.9 DIABETES MELLITUS (HCC): Status: ACTIVE | Noted: 2023-08-04

## 2023-08-04 PROCEDURE — 99214 OFFICE O/P EST MOD 30 MIN: CPT | Performed by: FAMILY MEDICINE

## 2023-08-04 PROCEDURE — 3017F COLORECTAL CA SCREEN DOC REV: CPT | Performed by: FAMILY MEDICINE

## 2023-08-04 PROCEDURE — G8427 DOCREV CUR MEDS BY ELIG CLIN: HCPCS | Performed by: FAMILY MEDICINE

## 2023-08-04 PROCEDURE — 1036F TOBACCO NON-USER: CPT | Performed by: FAMILY MEDICINE

## 2023-08-04 PROCEDURE — 3074F SYST BP LT 130 MM HG: CPT | Performed by: FAMILY MEDICINE

## 2023-08-04 PROCEDURE — G8417 CALC BMI ABV UP PARAM F/U: HCPCS | Performed by: FAMILY MEDICINE

## 2023-08-04 PROCEDURE — 3044F HG A1C LEVEL LT 7.0%: CPT | Performed by: FAMILY MEDICINE

## 2023-08-04 PROCEDURE — 2022F DILAT RTA XM EVC RTNOPTHY: CPT | Performed by: FAMILY MEDICINE

## 2023-08-04 PROCEDURE — 3078F DIAST BP <80 MM HG: CPT | Performed by: FAMILY MEDICINE

## 2023-08-04 RX ORDER — SEMAGLUTIDE 1.34 MG/ML
0.25 INJECTION, SOLUTION SUBCUTANEOUS WEEKLY
Qty: 3 ML | Refills: 3 | Status: SHIPPED | OUTPATIENT
Start: 2023-08-04

## 2023-08-04 ASSESSMENT — ENCOUNTER SYMPTOMS
ABDOMINAL PAIN: 0
SHORTNESS OF BREATH: 0
TROUBLE SWALLOWING: 0
DIARRHEA: 0
SORE THROAT: 0
VOMITING: 0
CONSTIPATION: 0
COUGH: 0

## 2023-08-04 NOTE — TELEPHONE ENCOUNTER
----- Message from Ayala Carranza MD sent at 8/4/2023 10:17 AM EDT -----  Regarding: GI referral  Please check on status of Referral to Dr. Gail Adames (GI) for colonoscopy, BOWERS  Order placed 7//6/23 and pt reports has  not yet been scheduled  Thanks

## 2023-08-04 NOTE — PROGRESS NOTES
Rhythm: Normal rate and regular rhythm. Pulses: Normal pulses. Heart sounds: No murmur heard. Pulmonary:      Effort: Pulmonary effort is normal. No respiratory distress. Breath sounds: Normal breath sounds. No wheezing. Abdominal:      General: Abdomen is flat. Bowel sounds are normal. There is no distension. Palpations: Abdomen is soft. There is no mass. Tenderness: There is no abdominal tenderness. There is no guarding or rebound. Musculoskeletal:         General: No swelling. Normal range of motion. Cervical back: Normal range of motion. No tenderness. Right lower leg: No edema. Left lower leg: No edema. Lymphadenopathy:      Cervical: No cervical adenopathy. Skin:     General: Skin is warm. Comments: Shallow skin tear to R lower abd without fluctuance or active bleeding   Neurological:      General: No focal deficit present. Mental Status: She is alert. Psychiatric:         Mood and Affect: Mood normal.       No results found for this visit on 08/04/23.     Lab Results   Component Value Date    LABA1C 6.6 (H) 06/23/2023       Lab Results   Component Value Date    CHOL 149 06/23/2023    TRIG 111 06/23/2023    HDL 44 06/23/2023    LDLCALC 83 06/23/2023       The 10-year ASCVD risk score (Ghislaine DK, et al., 2019) is: 6.6%    Values used to calculate the score:      Age: 59 years      Sex: Female      Is Non- : No      Diabetic: Yes      Tobacco smoker: No      Systolic Blood Pressure: 941 mmHg      Is BP treated: Yes      HDL Cholesterol: 44 mg/dL      Total Cholesterol: 149 mg/dL    Lab Results   Component Value Date     06/23/2023    K 3.9 06/23/2023    CL 96 (L) 06/23/2023    CO2 29 06/23/2023    BUN 11 06/23/2023    CREATININE 0.7 06/23/2023    GLUCOSE 118 (H) 06/23/2023    CALCIUM 9.3 06/23/2023    PROT 7.5 06/23/2023    LABALBU 3.9 06/23/2023    BILITOT 0.6 06/23/2023    ALKPHOS 187 (H) 06/23/2023    AST 91 (H)

## 2023-08-07 DIAGNOSIS — F34.1 PERSISTENT DEPRESSIVE DISORDER: ICD-10-CM

## 2023-08-07 RX ORDER — ARIPIPRAZOLE 5 MG/1
TABLET ORAL
Qty: 30 TABLET | Refills: 3 | Status: SHIPPED | OUTPATIENT
Start: 2023-08-07

## 2023-08-07 NOTE — TELEPHONE ENCOUNTER
Patient's last appointment was : 8/4/2023  Patient's next appointment is : 9/5/2023  Last sent in: 5/23/23 30 tabs with 3 refills

## 2023-08-07 NOTE — TELEPHONE ENCOUNTER
Called and spoke with patient. Patient had misunderstanding and called [facility] and they stated she had no appointment. Instructed patient it is at [facility], patient aware where this is and took number to call and reschedule colonoscopy.

## 2023-08-11 ENCOUNTER — TELEPHONE (OUTPATIENT)
Dept: FAMILY MEDICINE CLINIC | Age: 58
End: 2023-08-11

## 2023-08-11 NOTE — TELEPHONE ENCOUNTER
----- Message from Arlette Phi sent at 8/11/2023  9:51 AM EDT -----  Subject: Message to Provider    QUESTIONS  Information for Provider? patient switched her follow up til 9/15 from9/5. Plus she said the pharmacy said wait for prior author on ozempic and she   is still waiting on getting that meds . call her back on this.   ---------------------------------------------------------------------------  --------------  Mercedez ARCE  [phone]; OK to leave message on voicemail  ---------------------------------------------------------------------------  --------------  SCRIPT ANSWERS  Relationship to Patient?  Self

## 2023-08-11 NOTE — TELEPHONE ENCOUNTER
I called and let patient know her Ozempic was approved through her insurance and she can contact [facility] to get her script filled.

## 2023-09-11 ENCOUNTER — COMMUNITY OUTREACH (OUTPATIENT)
Dept: FAMILY MEDICINE CLINIC | Age: 58
End: 2023-09-11

## 2023-09-11 NOTE — PROGRESS NOTES
Patient's HM shows they are overdue for Mammogram, Colorectal Screening and Cervical Cancer Screening. Care Everywhere and  files searched. Results attached to order and HM updated, with occult stool from 2019.

## 2023-09-15 ENCOUNTER — OFFICE VISIT (OUTPATIENT)
Dept: FAMILY MEDICINE CLINIC | Age: 58
End: 2023-09-15
Payer: COMMERCIAL

## 2023-09-15 VITALS
HEART RATE: 70 BPM | DIASTOLIC BLOOD PRESSURE: 70 MMHG | WEIGHT: 196.2 LBS | BODY MASS INDEX: 38.52 KG/M2 | OXYGEN SATURATION: 97 % | HEIGHT: 60 IN | SYSTOLIC BLOOD PRESSURE: 118 MMHG

## 2023-09-15 DIAGNOSIS — Z79.4 TYPE 2 DIABETES MELLITUS WITH OTHER SPECIFIED COMPLICATION, WITH LONG-TERM CURRENT USE OF INSULIN (HCC): Primary | ICD-10-CM

## 2023-09-15 DIAGNOSIS — E11.69 TYPE 2 DIABETES MELLITUS WITH OTHER SPECIFIED COMPLICATION, WITH LONG-TERM CURRENT USE OF INSULIN (HCC): Primary | ICD-10-CM

## 2023-09-15 DIAGNOSIS — Z23 ENCOUNTER FOR IMMUNIZATION: ICD-10-CM

## 2023-09-15 LAB — HBA1C MFR BLD: 6 %

## 2023-09-15 PROCEDURE — 83036 HEMOGLOBIN GLYCOSYLATED A1C: CPT | Performed by: FAMILY MEDICINE

## 2023-09-15 PROCEDURE — 3044F HG A1C LEVEL LT 7.0%: CPT | Performed by: FAMILY MEDICINE

## 2023-09-15 PROCEDURE — 99214 OFFICE O/P EST MOD 30 MIN: CPT | Performed by: FAMILY MEDICINE

## 2023-09-15 PROCEDURE — 3017F COLORECTAL CA SCREEN DOC REV: CPT | Performed by: FAMILY MEDICINE

## 2023-09-15 PROCEDURE — 2022F DILAT RTA XM EVC RTNOPTHY: CPT | Performed by: FAMILY MEDICINE

## 2023-09-15 PROCEDURE — 90471 IMMUNIZATION ADMIN: CPT | Performed by: FAMILY MEDICINE

## 2023-09-15 PROCEDURE — 3078F DIAST BP <80 MM HG: CPT | Performed by: FAMILY MEDICINE

## 2023-09-15 PROCEDURE — G8427 DOCREV CUR MEDS BY ELIG CLIN: HCPCS | Performed by: FAMILY MEDICINE

## 2023-09-15 PROCEDURE — G8417 CALC BMI ABV UP PARAM F/U: HCPCS | Performed by: FAMILY MEDICINE

## 2023-09-15 PROCEDURE — 90674 CCIIV4 VAC NO PRSV 0.5 ML IM: CPT | Performed by: FAMILY MEDICINE

## 2023-09-15 PROCEDURE — 90472 IMMUNIZATION ADMIN EACH ADD: CPT | Performed by: FAMILY MEDICINE

## 2023-09-15 PROCEDURE — 1036F TOBACCO NON-USER: CPT | Performed by: FAMILY MEDICINE

## 2023-09-15 PROCEDURE — 3074F SYST BP LT 130 MM HG: CPT | Performed by: FAMILY MEDICINE

## 2023-09-15 PROCEDURE — 90677 PCV20 VACCINE IM: CPT | Performed by: FAMILY MEDICINE

## 2023-09-15 RX ORDER — SEMAGLUTIDE 1.34 MG/ML
0.5 INJECTION, SOLUTION SUBCUTANEOUS WEEKLY
Qty: 3 ML | Refills: 3 | Status: SHIPPED | OUTPATIENT
Start: 2023-09-15

## 2023-09-15 ASSESSMENT — ENCOUNTER SYMPTOMS
ABDOMINAL PAIN: 0
VOMITING: 0
TROUBLE SWALLOWING: 0
DIARRHEA: 0
COUGH: 0
SORE THROAT: 0
SHORTNESS OF BREATH: 0
CONSTIPATION: 0

## 2023-09-15 NOTE — PROGRESS NOTES
Immunization(s) given during visit:    Immunizations Administered       Name Date Dose Route    Influenza, FLUCELVAX, (age 10 mo+), MDCK, PF, 0.5mL 9/15/2023 0.5 mL Intramuscular    Site: Deltoid- Left    Lot: [id number]    NDC: 60569-743-51    Pneumococcal, PCV20, PREVNAR 20, (age 18y+), IM, 0.5mL 9/15/2023 0.5 mL Intramuscular    Site: Deltoid- Left    Lot: [id number]    NDC: 4844-6397-89            Most recent Vaccine Information Sheets given to pt, questions answered. Pt tolerated vaccines well. [facility] [place] [address]  Dept: [phone]     SUBJECTIVE     Jordin Gonzalez is a 59 y. o.female    Pt presents for follow up of medications. Pt feeling ok since last visit- interval history and any new issues noted below:     Pt reports feeling well with use of metformin and ozempic. No GI side effects.   Down 10 lbs since last visit      Patient Active Problem List   Diagnosis    Anxiety    Depression    OCD (obsessive compulsive disorder)    Essential hypertension    Other constipation    Adnexal mass    S/P exploratory laparotomy    Peripheral polyneuropathy    Right lumbar radiculitis    BOWERS (nonalcoholic steatohepatitis)    Diabetes mellitus (HCC)       Current Outpatient Medications   Medication Sig Dispense Refill    metFORMIN (GLUCOPHAGE) 500 MG tablet Take 1 tablet by mouth 2 times daily      Semaglutide,0.25 or 0.5MG/DOS, (OZEMPIC, 0.25 OR 0.5 MG/DOSE,) 2 MG/1.5ML SOPN Inject 0.5 mg into the skin once a week 3 mL 3    ARIPiprazole (ABILIFY) 5 MG tablet take 1 tablet by mouth once daily 30 tablet 3    hydroCHLOROthiazide (HYDRODIURIL) 25 MG tablet take 1 tablet by mouth once daily 30 tablet 11    escitalopram (LEXAPRO) 20 MG tablet Take 1.5 tablets by mouth daily 135 tablet 3    olopatadine (PATADAY) 0.2 % SOLN ophthalmic solution Place 1 drop into both eyes daily 2.5 mL 3    ASPIRIN LOW DOSE 81 MG EC tablet take 1 tablet by mouth once daily 90 tablet 3

## 2023-09-25 ENCOUNTER — TELEPHONE (OUTPATIENT)
Dept: PHYSICAL MEDICINE AND REHAB | Age: 58
End: 2023-09-25

## 2023-10-02 ENCOUNTER — OFFICE VISIT (OUTPATIENT)
Dept: INTERNAL MEDICINE CLINIC | Age: 58
End: 2023-10-02
Payer: COMMERCIAL

## 2023-10-02 VITALS — BODY MASS INDEX: 37.3 KG/M2 | WEIGHT: 191 LBS

## 2023-10-02 DIAGNOSIS — Z79.4 TYPE 2 DIABETES MELLITUS WITH OTHER SPECIFIED COMPLICATION, WITH LONG-TERM CURRENT USE OF INSULIN (HCC): Primary | ICD-10-CM

## 2023-10-02 DIAGNOSIS — K75.81 NASH (NONALCOHOLIC STEATOHEPATITIS): ICD-10-CM

## 2023-10-02 DIAGNOSIS — E11.69 TYPE 2 DIABETES MELLITUS WITH OTHER SPECIFIED COMPLICATION, WITH LONG-TERM CURRENT USE OF INSULIN (HCC): Primary | ICD-10-CM

## 2023-10-02 PROCEDURE — 99999 PR OFFICE/OUTPT VISIT,PROCEDURE ONLY: CPT | Performed by: DIETITIAN, REGISTERED

## 2023-10-02 PROCEDURE — 97802 MEDICAL NUTRITION INDIV IN: CPT | Performed by: DIETITIAN, REGISTERED

## 2023-10-02 NOTE — PROGRESS NOTES
[facility]  [address] [facility] [facility], [address] [place], [place] [address]  [phone] (phone)  [phone] (fax)    Patient Name: Curtis Mccrary. Date of Birth: [birth year]. MRN: [id number]      Assessment: Patient is a 59 y.o. female seen for Initial MNT visit for Diabetes and BOWERS. Newly diagnosed. -Nutritionally relevant labs:   Lab Results   Component Value Date/Time    LABA1C 6.0 09/15/2023 12:01 PM    LABA1C 6.6 (H) 2023 10:53 AM    LABA1C 6.4 2022 01:02 PM    GLUCOSE 118 (H) 2023 10:53 AM    GLUCOSE 185 (H) 2022 11:34 AM    CHOL 149 2023 10:53 AM    HDL 44 2023 10:53 AM    LDLCALC 83 2023 10:53 AM    TRIG 111 2023 10:53 AM     -Blood sugar trends: not instructed per MD.  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 - Exercise: just joined gym 2 weeks ago    -Food recall:   Breakfast: cheerios (switched from sugary cereal).    Am snack: alfredito crackers with pb  Lunch: salads (recent switch)  Afternoon snack: none   Dinner: skip or McDonalds fish sandwich, pepsi zero  Snacks: cereal/ ice cream bar    -Main Beverages: flavored water (aspartame)     -  Current Outpatient Medications on File Prior to Visit   Medication Sig Dispense Refill    metFORMIN (GLUCOPHAGE) 500 MG tablet Take 1 tablet by mouth 2 times daily      Semaglutide,0.25 or 0.5MG/DOS, (OZEMPIC, 0.25 OR 0.5 MG/DOSE,) 2 MG/1.5ML SOPN Inject 0.5 mg into the skin once a week 3 mL 3    ARIPiprazole (ABILIFY) 5 MG tablet take 1 tablet by mouth once daily 30 tablet 3    hydroCHLOROthiazide (HYDRODIURIL) 25 MG tablet take 1 tablet by mouth once daily 30 tablet 11    escitalopram (LEXAPRO) 20 MG tablet Take 1.5 tablets by mouth daily 135 tablet 3    olopatadine (PATADAY) 0.2 % SOLN ophthalmic solution Place 1 drop into both eyes daily 2.5 mL 3    ASPIRIN LOW DOSE 81 MG EC tablet take 1 tablet by mouth once daily 90 tablet 3    atorvastatin

## 2023-10-02 NOTE — PATIENT INSTRUCTIONS
1.) Karlos camejo can be a great alternative to soda's that are artificially sweetened. Brands to try Snatanaly , Aha, [employer] - look for just [employer] and natural flavors\"    2.) SaludFÃCIL    Go to magnifying glass, type in 1200, push enter and choose any meal plan but the \"Diabetes Meal Plan for beginners\" is great.

## 2023-10-19 NOTE — DISCHARGE INSTRUCTIONS
ANESTHESIA INSTRUCTIONS FOLLOWING SURGERY        Since you may experience some intermittent light-headedness for the next several hours, we suggest you plan on bed rest or quiet relaxation this evening. You must have a friend or relative stay with you tonight. Because of the sedation you have received, it is recommended that you do not drive a motor vehicle, operate any kind of machinery, or sign any contractual agreement for 24 hours following the procedure. You should not take alcoholic beverages tonight and only take sleeping medication that has been specifically prescribed for you by your physician. Call office [phone] if you have:  Temperature greater than 100.4  Persistent nausea and vomiting  Severe uncontrolled pain  Redness, tenderness, or signs of infection (pain, swelling, redness, odor or green/yellow discharge around the site)  Difficulty breathing, headache or visual disturbances  Hives  Persistent dizziness or light-headedness  Extreme fatigue  Any other questions or concerns you may have after discharge    In an emergency, call 911 or go to an Emergency Department at a nearby hospital    It is important to bring a complete, current list of your medications to any medical appointments or hospitalizations. REMINDER:   Carry a list of your medications and allergies with you at all times  Call your pharmacy at least 1 week in advance to refill prescriptions    Diet: Resume your usual diet. Good nutrition promotes healing. Increase fluid intake. Activity: Rest for 24 hrs then resume normal activity. HOME MEDICATIONS:      If on blood thinning products such as; Aspirin, NSAIDS, Plavix, Coumadin, Xarelto, Fish Oil, Multi-Vitamins or Herbal Supplements restart in 24 hours      Restart Metformin in 48 hours if you had procedure with dye. Restart Metformin in 24 hours if no dye used during procedure.         Education Materials Received: {yes/no:160056}  Belongings Returned:

## 2023-10-24 ENCOUNTER — APPOINTMENT (OUTPATIENT)
Dept: GENERAL RADIOLOGY | Age: 58
End: 2023-10-24
Attending: PAIN MEDICINE
Payer: COMMERCIAL

## 2023-10-24 ENCOUNTER — ANESTHESIA (OUTPATIENT)
Dept: OPERATING ROOM | Age: 58
End: 2023-10-24
Payer: COMMERCIAL

## 2023-10-24 ENCOUNTER — HOSPITAL ENCOUNTER (OUTPATIENT)
Age: 58
Setting detail: OUTPATIENT SURGERY
Discharge: HOME | End: 2023-10-24
Attending: PAIN MEDICINE | Admitting: PAIN MEDICINE
Payer: COMMERCIAL

## 2023-10-24 ENCOUNTER — ANESTHESIA EVENT (OUTPATIENT)
Dept: OPERATING ROOM | Age: 58
End: 2023-10-24
Payer: COMMERCIAL

## 2023-10-24 VITALS
SYSTOLIC BLOOD PRESSURE: 123 MMHG | WEIGHT: 186 LBS | HEART RATE: 70 BPM | HEIGHT: 60 IN | TEMPERATURE: 96.5 F | OXYGEN SATURATION: 92 % | RESPIRATION RATE: 16 BRPM | BODY MASS INDEX: 36.52 KG/M2 | DIASTOLIC BLOOD PRESSURE: 57 MMHG

## 2023-10-24 PROBLEM — M48.061 SPINAL STENOSIS OF LUMBAR REGION WITHOUT NEUROGENIC CLAUDICATION: Status: ACTIVE | Noted: 2023-10-24

## 2023-10-24 LAB — GLUCOSE BLD STRIP.AUTO-MCNC: 97 MG/DL (ref 70–108)

## 2023-10-24 PROCEDURE — 82948 REAGENT STRIP/BLOOD GLUCOSE: CPT

## 2023-10-24 PROCEDURE — 2500000003 HC RX 250 WO HCPCS: Performed by: PAIN MEDICINE

## 2023-10-24 PROCEDURE — 6360000002 HC RX W HCPCS

## 2023-10-24 PROCEDURE — 6360000002 HC RX W HCPCS: Performed by: PAIN MEDICINE

## 2023-10-24 PROCEDURE — 64483 NJX AA&/STRD TFRM EPI L/S 1: CPT | Performed by: PAIN MEDICINE

## 2023-10-24 PROCEDURE — 2709999900 HC NON-CHARGEABLE SUPPLY: Performed by: PAIN MEDICINE

## 2023-10-24 PROCEDURE — 7100000011 HC PHASE II RECOVERY - ADDTL 15 MIN: Performed by: PAIN MEDICINE

## 2023-10-24 PROCEDURE — 3600000054 HC PAIN LEVEL 3 BASE: Performed by: PAIN MEDICINE

## 2023-10-24 PROCEDURE — 3700000000 HC ANESTHESIA ATTENDED CARE: Performed by: PAIN MEDICINE

## 2023-10-24 PROCEDURE — 2500000003 HC RX 250 WO HCPCS

## 2023-10-24 PROCEDURE — 7100000010 HC PHASE II RECOVERY - FIRST 15 MIN: Performed by: PAIN MEDICINE

## 2023-10-24 PROCEDURE — 6360000004 HC RX CONTRAST MEDICATION: Performed by: PAIN MEDICINE

## 2023-10-24 PROCEDURE — 2580000003 HC RX 258

## 2023-10-24 RX ORDER — DEXAMETHASONE SODIUM PHOSPHATE 4 MG/ML
INJECTION, SOLUTION INTRA-ARTICULAR; INTRALESIONAL; INTRAMUSCULAR; INTRAVENOUS; SOFT TISSUE PRN
Status: DISCONTINUED | OUTPATIENT
Start: 2023-10-24 | End: 2023-10-24 | Stop reason: ALTCHOICE

## 2023-10-24 RX ORDER — LIDOCAINE HYDROCHLORIDE 20 MG/ML
INJECTION, SOLUTION EPIDURAL; INFILTRATION; INTRACAUDAL; PERINEURAL PRN
Status: DISCONTINUED | OUTPATIENT
Start: 2023-10-24 | End: 2023-10-24 | Stop reason: SDUPTHER

## 2023-10-24 RX ORDER — BUPIVACAINE HYDROCHLORIDE 2.5 MG/ML
INJECTION, SOLUTION EPIDURAL; INFILTRATION; INTRACAUDAL PRN
Status: DISCONTINUED | OUTPATIENT
Start: 2023-10-24 | End: 2023-10-24 | Stop reason: ALTCHOICE

## 2023-10-24 RX ORDER — LIDOCAINE HYDROCHLORIDE 10 MG/ML
INJECTION, SOLUTION INFILTRATION; PERINEURAL PRN
Status: DISCONTINUED | OUTPATIENT
Start: 2023-10-24 | End: 2023-10-24 | Stop reason: ALTCHOICE

## 2023-10-24 RX ORDER — FENTANYL CITRATE 50 UG/ML
INJECTION, SOLUTION INTRAMUSCULAR; INTRAVENOUS PRN
Status: DISCONTINUED | OUTPATIENT
Start: 2023-10-24 | End: 2023-10-24 | Stop reason: SDUPTHER

## 2023-10-24 RX ORDER — SODIUM CHLORIDE 9 MG/ML
INJECTION, SOLUTION INTRAVENOUS CONTINUOUS PRN
Status: DISCONTINUED | OUTPATIENT
Start: 2023-10-24 | End: 2023-10-24 | Stop reason: SDUPTHER

## 2023-10-24 RX ADMIN — FENTANYL CITRATE 50 MCG: 50 INJECTION, SOLUTION INTRAMUSCULAR; INTRAVENOUS at 09:06

## 2023-10-24 RX ADMIN — LIDOCAINE HYDROCHLORIDE 50 MG: 20 INJECTION, SOLUTION EPIDURAL; INFILTRATION; INTRACAUDAL; PERINEURAL at 09:02

## 2023-10-24 RX ADMIN — FENTANYL CITRATE 50 MCG: 50 INJECTION, SOLUTION INTRAMUSCULAR; INTRAVENOUS at 09:02

## 2023-10-24 RX ADMIN — SODIUM CHLORIDE: 9 INJECTION, SOLUTION INTRAVENOUS at 09:02

## 2023-10-24 ASSESSMENT — PAIN - FUNCTIONAL ASSESSMENT: PAIN_FUNCTIONAL_ASSESSMENT: 0-10

## 2023-10-24 NOTE — PROGRESS NOTES
7725 Patient arrives to recovery room via cart. Spontaneous respirations, vss, report received from surgical RN. Patient denies pain, numbness, tingling , nausea. Injection site clean, dry, intact. HOB elevated. IV capped. Snack and drink given. Call light within reach. 0930 Denies numbness or tingling. Stands at bedside with RN.  To dress self  0940 Discharge to home in stable condition per wheelchair with family

## 2023-10-24 NOTE — ANESTHESIA PRE PROCEDURE
Department of Anesthesiology  Preprocedure Note       Name:  Carlitos Casey   Age:  59 y.o.  :  1965                                          MRN:  [id number]         Date:  10/24/2023      Surgeon: Eagle Dee):  Terrell James MD    Procedure: Procedure(s):  Transforaminal  lumbar 5 Epidural Steroid Injection  Right    Medications prior to admission:   Prior to Admission medications    Medication Sig Start Date End Date Taking?  Authorizing Provider   metFORMIN (GLUCOPHAGE) 500 MG tablet Take 1 tablet by mouth 2 times daily 23   Provider, MD Debra   Semaglutide,0.25 or 0.5MG/DOS, (OZEMPIC, 0.25 OR 0.5 MG/DOSE,) 2 MG/1.5ML SOPN Inject 0.5 mg into the skin once a week 9/15/23   Vahe Gardner MD   ARIPiprazole (ABILIFY) 5 MG tablet take 1 tablet by mouth once daily 23   Vahe Gardner MD   hydroCHLOROthiazide (HYDRODIURIL) 25 MG tablet take 1 tablet by mouth once daily 23   Vahe Gardner MD   escitalopram (LEXAPRO) 20 MG tablet Take 1.5 tablets by mouth daily 23  Vahe Gardner MD   olopatadine (PATADAY) 0.2 % SOLN ophthalmic solution Place 1 drop into both eyes daily 23   Vahe Gardner MD   ASPIRIN LOW DOSE 81 MG EC tablet take 1 tablet by mouth once daily 23   Vahe Gardner MD   atorvastatin (LIPITOR) 20 MG tablet take 1 tablet by mouth once daily 23   Vahe Gardner MD   amLODIPine [facility]) 10 MG tablet take 1 tablet by mouth once daily 23   Vahe Gardner MD   omeprazole (PRILOSEC) 40 MG delayed release capsule Take 1 capsule by mouth daily 1/17/23 7/10/23  Vahe Gardner MD   montelukast (SINGULAIR) 10 MG tablet take 1 tablet by mouth every evening 22   Vahe Gardner MD   busPIRone (BUSPAR) 15 MG tablet take 1 tablet by mouth three times a day 22   Vahe Gardner MD   metoprolol succinate (TOPROL XL) 25 MG extended release tablet Take 1 tablet by mouth

## 2023-10-24 NOTE — H&P
Neurology was placed    Psychiatric/Behavioral:  The patient is nervous/anxious. Anxiety depression OCD      Objective:      Vitals       Vitals:     10/24/23  0800   BP: 130/62   Site: Left Upper Arm   Position: Sitting   Cuff Size: Medium Adult   Pulse: 70   SpO2: 96%   Weight: 205 lb (93 kg)            Physical Exam  Vitals and nursing note reviewed. Constitutional:       General: She is not in acute distress. Appearance: She is well-developed. She is not diaphoretic. HENT:      Head: Normocephalic and atraumatic. Right Ear: External ear normal.      Left Ear: External ear normal.      Nose: Nose normal.      Mouth/Throat:      Pharynx: No oropharyngeal exudate. Eyes:      General: No scleral icterus. Right eye: No discharge. Left eye: No discharge. Conjunctiva/sclera: Conjunctivae normal.      Pupils: Pupils are equal, round, and reactive to light. Neck:      Thyroid: No thyromegaly. Cardiovascular:      Rate and Rhythm: Normal rate and regular rhythm. Heart sounds: Normal heart sounds. No murmur heard. No friction rub. No gallop. Pulmonary:      Effort: Pulmonary effort is normal. No respiratory distress. Breath sounds: Normal breath sounds. No wheezing or rales. Chest:      Chest wall: No tenderness. Abdominal:      General: Bowel sounds are normal. There is no distension. Palpations: Abdomen is soft. Tenderness: There is no abdominal tenderness. There is no guarding or rebound. Musculoskeletal:         General: Tenderness present. Right shoulder: Normal.      Left shoulder: Normal.      Cervical back: Full passive range of motion without pain, normal range of motion and neck supple. Bony tenderness present. No edema, erythema or rigidity. No muscular tenderness. Normal range of motion. Thoracic back: Tenderness and bony tenderness present. Lumbar back: Spasms, tenderness and bony tenderness present.  Decreased range of

## 2023-10-24 NOTE — ANESTHESIA POSTPROCEDURE EVALUATION
Department of Anesthesiology  Postprocedure Note    Patient: Comfort Brooks  MRN: [id number]  YOB: 1965  Date of evaluation: 10/24/2023      Procedure Summary     Date: 10/24/23 Room / Location: [facility] 03 / [facility]    Anesthesia Start: 8009 Anesthesia Stop: 7256    Procedure: Transforaminal  lumbar 5 Epidural Steroid Injection  Right (Right) Diagnosis:       Lumbar radiculitis      (Lumbar radiculitis [M54.16])    Surgeons: Catrachito Carey MD Responsible Provider: Ivis Rocha MD    Anesthesia Type: MAC ASA Status: 2          Anesthesia Type: No value filed.     Dhaval Phase I: Dhaval Score: 10    Dhaval Phase II: Dhaval Score: 10      Anesthesia Post Evaluation    Complications: no

## 2023-11-22 ENCOUNTER — TELEPHONE (OUTPATIENT)
Dept: FAMILY MEDICINE CLINIC | Age: 58
End: 2023-11-22

## 2023-11-22 DIAGNOSIS — G25.81 RESTLESS LEGS: Primary | ICD-10-CM

## 2023-11-22 NOTE — TELEPHONE ENCOUNTER
----- Message from [facility] sent at 11/22/2023  2:14 PM EST -----  Subject: Message to Provider    QUESTIONS  Information for Provider? Patient is requesting something for her restless   leg syndrome. She is having trouble sleeping because of it. Please let her   know if Dr. Karin Mckeon will prescribe something for her.  ---------------------------------------------------------------------------  --------------  Loreta [place] Doug  [phone]; OK to leave message on voicemail  ---------------------------------------------------------------------------  --------------  SCRIPT ANSWERS  Relationship to Patient?  Self

## 2023-11-22 NOTE — TELEPHONE ENCOUNTER
Pt called back into the office she would like order placed and will have BW done.  Pt made an apt to review BW on 12-1-23

## 2023-11-22 NOTE — TELEPHONE ENCOUNTER
Usually first line treatment for RLS is iron replacement if ferritin is low  Recc evaluation with labs and can see in office to discuss treatment with either iron or other medication if indicated

## 2023-11-27 ENCOUNTER — NURSE ONLY (OUTPATIENT)
Dept: LAB | Age: 58
End: 2023-11-27

## 2023-11-27 DIAGNOSIS — G25.81 RESTLESS LEGS: ICD-10-CM

## 2023-11-27 LAB
BASOPHILS ABSOLUTE: 0.1 THOU/MM3 (ref 0–0.1)
BASOPHILS NFR BLD AUTO: 0.9 %
DEPRECATED RDW RBC AUTO: 46 FL (ref 35–45)
EOSINOPHIL NFR BLD AUTO: 1.2 %
EOSINOPHILS ABSOLUTE: 0.1 THOU/MM3 (ref 0–0.4)
ERYTHROCYTE [DISTWIDTH] IN BLOOD BY AUTOMATED COUNT: 14.4 % (ref 11.5–14.5)
HCT VFR BLD AUTO: 44.1 % (ref 37–47)
HGB BLD-MCNC: 15.2 GM/DL (ref 12–16)
IMM GRANULOCYTES # BLD AUTO: 0.02 THOU/MM3 (ref 0–0.07)
IMM GRANULOCYTES NFR BLD AUTO: 0.2 %
LYMPHOCYTES ABSOLUTE: 2.8 THOU/MM3 (ref 1–4.8)
LYMPHOCYTES NFR BLD AUTO: 29.4 %
MCH RBC QN AUTO: 30.5 PG (ref 26–33)
MCHC RBC AUTO-ENTMCNC: 34.5 GM/DL (ref 32.2–35.5)
MCV RBC AUTO: 88.4 FL (ref 81–99)
MONOCYTES ABSOLUTE: 0.6 THOU/MM3 (ref 0.4–1.3)
MONOCYTES NFR BLD AUTO: 6.2 %
NEUTROPHILS NFR BLD AUTO: 62.1 %
NRBC BLD AUTO-RTO: 0 /100 WBC
PLATELET # BLD AUTO: 219 THOU/MM3 (ref 130–400)
PMV BLD AUTO: 10.7 FL (ref 9.4–12.4)
RBC # BLD AUTO: 4.99 MILL/MM3 (ref 4.2–5.4)
SEGMENTED NEUTROPHILS ABSOLUTE COUNT: 5.9 THOU/MM3 (ref 1.8–7.7)
WBC # BLD AUTO: 9.5 THOU/MM3 (ref 4.8–10.8)

## 2023-11-27 RX ORDER — METOPROLOL SUCCINATE 25 MG/1
25 TABLET, EXTENDED RELEASE ORAL DAILY
Qty: 30 TABLET | Refills: 11 | Status: SHIPPED | OUTPATIENT
Start: 2023-11-27

## 2023-11-28 LAB — FERRITIN SERPL IA-MCNC: 80 NG/ML (ref 10–291)

## 2023-12-12 DIAGNOSIS — F34.1 PERSISTENT DEPRESSIVE DISORDER: ICD-10-CM

## 2023-12-12 RX ORDER — ARIPIPRAZOLE 5 MG/1
TABLET ORAL
Qty: 30 TABLET | Refills: 3 | Status: SHIPPED | OUTPATIENT
Start: 2023-12-12

## 2023-12-14 ENCOUNTER — OFFICE VISIT (OUTPATIENT)
Dept: PAIN MANAGEMENT | Age: 58
End: 2023-12-14
Payer: COMMERCIAL

## 2023-12-14 VITALS
WEIGHT: 185 LBS | DIASTOLIC BLOOD PRESSURE: 68 MMHG | BODY MASS INDEX: 36.32 KG/M2 | HEIGHT: 60 IN | HEART RATE: 72 BPM | SYSTOLIC BLOOD PRESSURE: 128 MMHG

## 2023-12-14 DIAGNOSIS — M53.3 SACROILIAC JOINT DYSFUNCTION: ICD-10-CM

## 2023-12-14 DIAGNOSIS — M47.816 LUMBAR SPONDYLOSIS: ICD-10-CM

## 2023-12-14 DIAGNOSIS — M54.17 LUMBOSACRAL RADICULITIS: ICD-10-CM

## 2023-12-14 DIAGNOSIS — M46.1 SI (SACROILIAC) JOINT INFLAMMATION (HCC): Primary | ICD-10-CM

## 2023-12-14 PROCEDURE — 99214 OFFICE O/P EST MOD 30 MIN: CPT | Performed by: NURSE PRACTITIONER

## 2023-12-14 PROCEDURE — 1036F TOBACCO NON-USER: CPT | Performed by: NURSE PRACTITIONER

## 2023-12-14 PROCEDURE — G8482 FLU IMMUNIZE ORDER/ADMIN: HCPCS | Performed by: NURSE PRACTITIONER

## 2023-12-14 PROCEDURE — G8427 DOCREV CUR MEDS BY ELIG CLIN: HCPCS | Performed by: NURSE PRACTITIONER

## 2023-12-14 PROCEDURE — G8417 CALC BMI ABV UP PARAM F/U: HCPCS | Performed by: NURSE PRACTITIONER

## 2023-12-14 PROCEDURE — 3074F SYST BP LT 130 MM HG: CPT | Performed by: NURSE PRACTITIONER

## 2023-12-14 PROCEDURE — 3017F COLORECTAL CA SCREEN DOC REV: CPT | Performed by: NURSE PRACTITIONER

## 2023-12-14 PROCEDURE — 3078F DIAST BP <80 MM HG: CPT | Performed by: NURSE PRACTITIONER

## 2023-12-14 NOTE — PROGRESS NOTES
Functionality Assessment/Goals Worksheet     On a scale of 0 (Does not Interfere) to 10 (Completely Interferes)     1. Which number describes how during the past week pain has interfered with           the following:  A. General Activity:  5  B. Mood: 3  C. Walking Ability:  5  D. Normal Work (Includes both work outside the home and housework):  5  E. Relations with Other People:   1  F. Sleep:   2  G. Enjoyment of Life:   3    2. Patient Prefers to Take their Pain Medications:     []  On a regular basis   []  Only when necessary    [x]  Does not take pain medications    3. What are the Patient's Goals/Expectations for Visiting Pain Management? []  Learn about my pain    []  Receive Medication   []  Physical Therapy     []  Treat Depression   [x]  Receive Injections    []  Treat Sleep   []  Deal with Anxiety and Stress   []  Treat Opoid Dependence/Addiction   []  Other:       HPI:     ChiefComplaint: Lumbar back pain, Right leg pain, Left leg pain, and SI pain      F/U TFLESI @L5 Right 10/24/2023  states no relief at all in her leg pain. Her main pain complaint is her Bilateral SI greater on right side constant  low lumbar pain. She has increased pain with sitting  getting up and down walking standing. 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 She has increased low lumbar sacral  pain stiffness. Sharp stabbing pains in Right SI at times travels down right thigh stops at knee. HPI  New pt here for low back pain. She denies any prior lumbar surgery or injections. She has not seen a spine surgeon for this lumbar pain and RLE radicular pain. 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 No falls trauma or MVC in past.  She has had some PT within last 6 months  no relief per pt. She had EMG BLE and Lumbar CT and MRI 2022. NSAIDS cause her stomach issues. She  uses Tylenol, rest, massage and Chiropractor. To try to manage her pain.        She c/o low back pain for many years

## 2024-01-03 ENCOUNTER — TELEPHONE (OUTPATIENT)
Dept: PHYSICAL MEDICINE AND REHAB | Age: 59
End: 2024-01-03

## 2024-01-04 ENCOUNTER — TELEPHONE (OUTPATIENT)
Dept: PHYSICAL MEDICINE AND REHAB | Age: 59
End: 2024-01-04

## 2024-01-04 NOTE — TELEPHONE ENCOUNTER
Called pt to get updated insurance info as hers is termed. Got her procedure canceled and she will call back tomorrow to get rescheduled once we get new insurance.

## 2024-01-08 DIAGNOSIS — M46.1 SACROILIAC INFLAMMATION (HCC): Primary | ICD-10-CM

## 2024-01-17 NOTE — DISCHARGE INSTRUCTIONS
ANESTHESIA INSTRUCTIONS FOLLOWING SURGERY        Since you may experience some intermittent light-headedness for the next several hours, we suggest you plan on bed rest or quiet relaxation this evening. You must have a friend or relative stay with you tonight.    Because of the sedation you have received, it is recommended that you do not drive a motor vehicle, operate any kind of machinery, or sign any contractual agreement for 24 hours following the procedure.    You should not take alcoholic beverages tonight and only take sleeping medication that has been specifically prescribed for you by your physician.  Call office [phone] if you have:  Temperature greater than 100.4  Persistent nausea and vomiting  Severe uncontrolled pain  Redness, tenderness, or signs of infection (pain, swelling, redness, odor or green/yellow discharge around the site)  Difficulty breathing, headache or visual disturbances  Hives  Persistent dizziness or light-headedness  Extreme fatigue  Any other questions or concerns you may have after discharge    In an emergency, call 911 or go to an Emergency Department at a nearby hospital    It is important to bring a complete, current list of your medications to any medical appointments or hospitalizations.    REMINDER:   Carry a list of your medications and allergies with you at all times  Call your pharmacy at least 1 week in advance to refill prescriptions    Diet: Resume your usual diet. Good nutrition promotes healing. Increase fluid intake.     Activity: Rest for 24 hrs then resume normal activity.    HOME MEDICATIONS:      If on blood thinning products such as; Aspirin, NSAIDS, Plavix, Coumadin, Xarelto, Fish Oil, Multi-Vitamins or Herbal Supplements restart in 24 hours      Restart Metformin in 48 hours if you had procedure with dye.      Restart Metformin in 24 hours if no dye used during procedure.        Education Materials Received: yes  Belongings Returned: yes          I

## 2024-01-25 ENCOUNTER — APPOINTMENT (OUTPATIENT)
Dept: GENERAL RADIOLOGY | Age: 59
End: 2024-01-25
Attending: PAIN MEDICINE
Payer: COMMERCIAL

## 2024-01-25 ENCOUNTER — HOSPITAL ENCOUNTER (OUTPATIENT)
Age: 59
Setting detail: OUTPATIENT SURGERY
Discharge: HOME | End: 2024-01-25
Attending: PAIN MEDICINE | Admitting: PAIN MEDICINE
Payer: COMMERCIAL

## 2024-01-25 VITALS
DIASTOLIC BLOOD PRESSURE: 58 MMHG | RESPIRATION RATE: 16 BRPM | HEIGHT: 60 IN | OXYGEN SATURATION: 97 % | BODY MASS INDEX: 35.61 KG/M2 | TEMPERATURE: 97.1 F | HEART RATE: 73 BPM | WEIGHT: 181.4 LBS | SYSTOLIC BLOOD PRESSURE: 119 MMHG

## 2024-01-25 PROBLEM — M46.1 SACROILIAC INFLAMMATION (HCC): Status: ACTIVE | Noted: 2024-01-25

## 2024-01-25 LAB — GLUCOSE BLD STRIP.AUTO-MCNC: 99 MG/DL (ref 70–108)

## 2024-01-25 PROCEDURE — 6360000004 HC RX CONTRAST MEDICATION: Performed by: PAIN MEDICINE

## 2024-01-25 PROCEDURE — 7100000010 HC PHASE II RECOVERY - FIRST 15 MIN: Performed by: PAIN MEDICINE

## 2024-01-25 PROCEDURE — 2500000003 HC RX 250 WO HCPCS: Performed by: PAIN MEDICINE

## 2024-01-25 PROCEDURE — 7100000011 HC PHASE II RECOVERY - ADDTL 15 MIN: Performed by: PAIN MEDICINE

## 2024-01-25 PROCEDURE — 3600000054 HC PAIN LEVEL 3 BASE: Performed by: PAIN MEDICINE

## 2024-01-25 PROCEDURE — 82948 REAGENT STRIP/BLOOD GLUCOSE: CPT

## 2024-01-25 PROCEDURE — 6360000002 HC RX W HCPCS: Performed by: PAIN MEDICINE

## 2024-01-25 PROCEDURE — 27096 INJECT SACROILIAC JOINT: CPT | Performed by: PAIN MEDICINE

## 2024-01-25 PROCEDURE — 2709999900 HC NON-CHARGEABLE SUPPLY: Performed by: PAIN MEDICINE

## 2024-01-25 RX ORDER — LIDOCAINE HYDROCHLORIDE 20 MG/ML
INJECTION, SOLUTION INFILTRATION; PERINEURAL PRN
Status: DISCONTINUED | OUTPATIENT
Start: 2024-01-25 | End: 2024-01-25 | Stop reason: ALTCHOICE

## 2024-01-25 RX ORDER — BUPIVACAINE HYDROCHLORIDE 5 MG/ML
INJECTION, SOLUTION PERINEURAL PRN
Status: DISCONTINUED | OUTPATIENT
Start: 2024-01-25 | End: 2024-01-25 | Stop reason: ALTCHOICE

## 2024-01-25 RX ORDER — METHYLPREDNISOLONE ACETATE 80 MG/ML
INJECTION, SUSPENSION INTRA-ARTICULAR; INTRALESIONAL; INTRAMUSCULAR; SOFT TISSUE PRN
Status: DISCONTINUED | OUTPATIENT
Start: 2024-01-25 | End: 2024-01-25 | Stop reason: ALTCHOICE

## 2024-01-25 ASSESSMENT — PAIN - FUNCTIONAL ASSESSMENT: PAIN_FUNCTIONAL_ASSESSMENT: NONE - DENIES PAIN

## 2024-01-25 NOTE — H&P
H&P      Patient  main pain complaint is her Bilateral SI greater on right side constant  low lumbar pain.  She has increased pain with sitting  getting up and down walking standing.  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  She has increased low lumbar sacral  pain stiffness. Sharp stabbing pains in Right SI at times travels down right thigh stops at knee.         HPI  New pt here for low back pain. She denies any prior lumbar surgery or injections. She has not seen a spine surgeon for this lumbar pain and RLE radicular pain.  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 No falls trauma or MVC in past.  She has had some PT within last 6 months  no relief per pt. She had EMG BLE and Lumbar CT and MRI 2022. NSAIDS cause her stomach issues.  She  uses Tylenol, rest, massage and Chiropractor. To try to manage her pain.         She c/o low back pain for many years 10 plus. The pain is mostly aching axial bilateral but most;ly  right sided has become worse over past 6-12 months. She has been  having some balance issues.  She follows local Neurology and most recently was referred to [facility]  for higher level of care workup.  She does have h/o Cervical  Fusion and a prior cervical surgery at [facility] 10 years ago with Dr Hawley.      She has constant low back  axial pain mostly aching 40%  that becomes sharp stabbing  at times then radiates into right SI right hip down leg to ankle 60% . She also c/o she has RLS. It is hard for her to sleep. She had RLE weakness with numbness tingling down into ankle. No s/s into foot or toes. She does not use assistive devices for ambulation. She does walk with a slight limp. She has some foot drag at times.  She c/o  her RLE  is her main pain complaint at this time, affects her day to day life activities and she is unable to sleep or sit still.               Patient pain increases with lifting, stairs, sitting, getting up and down, laying, and housework or working

## 2024-01-25 NOTE — PROGRESS NOTES
0924: Patient arrived to Phase II recovery via cart. Awake and alert. Report received from JOSEPH Sapp.   0925: VSS. Patient denies pain. HOB elevated. Snack and drink provided. Patient denies numbness/tingling to BLE. Call light in reach.  0935: Patient sat edge of bed and stood without issues. Dressed independently in room.  0940: Patient ambulatory to vehicle and discharged home in stable condition with daughter.

## 2024-01-25 NOTE — OP NOTE
Pre-Procedure Note    Patient Name: Suze Atkins   YOB: 1965  Medical Record Number: [id number]  Date: 1/25/24     Indication: SI pain    Consent: On file.    Vital Signs:   Vitals:    01/25/24 0837   BP: 134/62   Pulse: 77   Resp: 17   Temp: 98.3 °F (36.8 °C)   SpO2: 96%       Past Medical History:   has a past medical history of Anxiety, Depression, Diabetes mellitus (HCC), Diverticulitis, Hypertension, Interstitial cystitis, OCD (obsessive compulsive disorder), Restless leg syndrome, and Sleep apnea.    Past Surgical History:   has a past surgical history that includes Bladder surgery (10-13-09); Abdominal adhesion surgery (10-13-09); Hysterectomy (10-13-09); Endometrial biopsy (9-21-09); Cervical disc surgery (2015); Colonoscopy; eye surgery; Foot surgery (Left, 02/23/2017); colectomy (2016); LAPAROTOMY EXPLORATORY (N/A, 11/1/2019); and Pain management procedure (Right, 10/24/2023).    Pre-Sedation Documentation and Exam:   Vital signs have been reviewed (see flow sheet for vitals).     Sedation/ Anesthesia Plan: LOCAL    Patient is an appropriate candidate for plan of sedation: yes    Preoperative Diagnosis:  Bilateral sacroiliitis.    Postoperative Diagnosis: Bilateral  Sacroiliitis.    Procedure Performed:  Bilateral sacroiliac joint injection under fluoroscopy guidance .      Indication for the Procedure:  The patient failed conservative management  for pain in low back.  The patient is tender over the Bilateral SI joint.  Abran's test is positive on the Bilateral side.  As patient is not responding to conservative management and pain is interfering with activities of daily living we decided to proceed with SI joint injection. The procedure and risks were discussed with the patient and an informed consent was obtained.    Procedure:     A meaningful communication was kept up with the patient throughout the procedure.  The patient is placed in prone position.  Skin over the back was

## 2024-02-02 ENCOUNTER — OFFICE VISIT (OUTPATIENT)
Dept: FAMILY MEDICINE CLINIC | Age: 59
End: 2024-02-02
Payer: COMMERCIAL

## 2024-02-02 VITALS
BODY MASS INDEX: 36.08 KG/M2 | SYSTOLIC BLOOD PRESSURE: 128 MMHG | DIASTOLIC BLOOD PRESSURE: 68 MMHG | OXYGEN SATURATION: 97 % | HEIGHT: 60 IN | WEIGHT: 183.8 LBS | HEART RATE: 68 BPM

## 2024-02-02 DIAGNOSIS — I10 ESSENTIAL HYPERTENSION: ICD-10-CM

## 2024-02-02 DIAGNOSIS — G25.81 RESTLESS LEGS SYNDROME (RLS): ICD-10-CM

## 2024-02-02 DIAGNOSIS — E11.69 TYPE 2 DIABETES MELLITUS WITH OTHER SPECIFIED COMPLICATION, WITHOUT LONG-TERM CURRENT USE OF INSULIN (HCC): Primary | ICD-10-CM

## 2024-02-02 DIAGNOSIS — F34.1 PERSISTENT DEPRESSIVE DISORDER: ICD-10-CM

## 2024-02-02 DIAGNOSIS — E66.01 SEVERE OBESITY (BMI 35.0-35.9 WITH COMORBIDITY) (HCC): ICD-10-CM

## 2024-02-02 DIAGNOSIS — M46.1 SACROILIAC INFLAMMATION (HCC): ICD-10-CM

## 2024-02-02 PROCEDURE — 3074F SYST BP LT 130 MM HG: CPT | Performed by: FAMILY MEDICINE

## 2024-02-02 PROCEDURE — 99214 OFFICE O/P EST MOD 30 MIN: CPT | Performed by: FAMILY MEDICINE

## 2024-02-02 PROCEDURE — 2022F DILAT RTA XM EVC RTNOPTHY: CPT | Performed by: FAMILY MEDICINE

## 2024-02-02 PROCEDURE — 3017F COLORECTAL CA SCREEN DOC REV: CPT | Performed by: FAMILY MEDICINE

## 2024-02-02 PROCEDURE — G8417 CALC BMI ABV UP PARAM F/U: HCPCS | Performed by: FAMILY MEDICINE

## 2024-02-02 PROCEDURE — 3046F HEMOGLOBIN A1C LEVEL >9.0%: CPT | Performed by: FAMILY MEDICINE

## 2024-02-02 PROCEDURE — 1036F TOBACCO NON-USER: CPT | Performed by: FAMILY MEDICINE

## 2024-02-02 PROCEDURE — G8427 DOCREV CUR MEDS BY ELIG CLIN: HCPCS | Performed by: FAMILY MEDICINE

## 2024-02-02 PROCEDURE — G8482 FLU IMMUNIZE ORDER/ADMIN: HCPCS | Performed by: FAMILY MEDICINE

## 2024-02-02 PROCEDURE — 3078F DIAST BP <80 MM HG: CPT | Performed by: FAMILY MEDICINE

## 2024-02-02 RX ORDER — GABAPENTIN 100 MG/1
CAPSULE ORAL
Qty: 120 CAPSULE | Refills: 5 | Status: SHIPPED | OUTPATIENT
Start: 2024-02-02 | End: 2024-07-31

## 2024-02-02 ASSESSMENT — ENCOUNTER SYMPTOMS
CONSTIPATION: 0
DIARRHEA: 0
COUGH: 0
TROUBLE SWALLOWING: 0
SORE THROAT: 0
SHORTNESS OF BREATH: 0
ABDOMINAL PAIN: 0
VOMITING: 0

## 2024-02-02 ASSESSMENT — PATIENT HEALTH QUESTIONNAIRE - PHQ9
SUM OF ALL RESPONSES TO PHQ QUESTIONS 1-9: 1
SUM OF ALL RESPONSES TO PHQ QUESTIONS 1-9: 1
8. MOVING OR SPEAKING SO SLOWLY THAT OTHER PEOPLE COULD HAVE NOTICED. OR THE OPPOSITE, BEING SO FIGETY OR RESTLESS THAT YOU HAVE BEEN MOVING AROUND A LOT MORE THAN USUAL: 0
3. TROUBLE FALLING OR STAYING ASLEEP: 0
SUM OF ALL RESPONSES TO PHQ QUESTIONS 1-9: 1
10. IF YOU CHECKED OFF ANY PROBLEMS, HOW DIFFICULT HAVE THESE PROBLEMS MADE IT FOR YOU TO DO YOUR WORK, TAKE CARE OF THINGS AT HOME, OR GET ALONG WITH OTHER PEOPLE: 0
1. LITTLE INTEREST OR PLEASURE IN DOING THINGS: 0
6. FEELING BAD ABOUT YOURSELF - OR THAT YOU ARE A FAILURE OR HAVE LET YOURSELF OR YOUR FAMILY DOWN: 0
SUM OF ALL RESPONSES TO PHQ QUESTIONS 1-9: 1
5. POOR APPETITE OR OVEREATING: 0
7. TROUBLE CONCENTRATING ON THINGS, SUCH AS READING THE NEWSPAPER OR WATCHING TELEVISION: 1
4. FEELING TIRED OR HAVING LITTLE ENERGY: 0
SUM OF ALL RESPONSES TO PHQ9 QUESTIONS 1 & 2: 0
9. THOUGHTS THAT YOU WOULD BE BETTER OFF DEAD, OR OF HURTING YOURSELF: 0
2. FEELING DOWN, DEPRESSED OR HOPELESS: 0

## 2024-02-02 NOTE — PROGRESS NOTES
(PATADAY) 0.2 % SOLN ophthalmic solution Place 1 drop into both eyes daily 2.5 mL 3    ASPIRIN LOW DOSE 81 MG EC tablet take 1 tablet by mouth once daily 90 tablet 3    atorvastatin (LIPITOR) 20 MG tablet take 1 tablet by mouth once daily 90 tablet 3    amLODIPine (NORVASC) 10 MG tablet take 1 tablet by mouth once daily 90 tablet 3    montelukast (SINGULAIR) 10 MG tablet take 1 tablet by mouth every evening 90 tablet 3    busPIRone (BUSPAR) 15 MG tablet take 1 tablet by mouth three times a day 90 tablet 11    omeprazole (PRILOSEC) 40 MG delayed release capsule Take 1 capsule by mouth daily (Patient not taking: Reported on 1/25/2024) 90 capsule 3     No current facility-administered medications for this visit.       Review of Systems   Constitutional:  Negative for appetite change, fatigue, fever and unexpected weight change.   HENT:  Negative for congestion, ear pain, sore throat and trouble swallowing.    Eyes:  Negative for visual disturbance.   Respiratory:  Negative for cough and shortness of breath.    Cardiovascular:  Negative for chest pain, palpitations and leg swelling.   Gastrointestinal:  Negative for abdominal pain, constipation, diarrhea and vomiting.   Genitourinary:  Negative for dysuria and frequency.   Musculoskeletal:  Negative for arthralgias and myalgias.   Skin:  Negative for rash.   Neurological:  Negative for dizziness.       OBJECTIVE     /68 (Site: Left Upper Arm, Position: Sitting, Cuff Size: Medium Adult)   Pulse 68   Ht 1.524 m (5')   Wt 83.4 kg (183 lb 12.8 oz)   SpO2 97%   BMI 35.90 kg/m²   Body mass index is 35.9 kg/m².  BP Readings from Last 3 Encounters:   02/02/24 128/68   01/25/24 (!) 119/58   12/22/23 128/80         Physical Exam  Vitals and nursing note reviewed.   Constitutional:       General: She is not in acute distress.     Appearance: Normal appearance. She is normal weight. She is not ill-appearing.   HENT:      Head: Normocephalic and atraumatic.      Nose:

## 2024-02-07 RX ORDER — OMEPRAZOLE 40 MG/1
40 CAPSULE, DELAYED RELEASE ORAL DAILY
Qty: 90 CAPSULE | Refills: 3 | Status: SHIPPED | OUTPATIENT
Start: 2024-02-07 | End: 2025-02-01

## 2024-02-07 NOTE — TELEPHONE ENCOUNTER
Left message to call office back. Patient stated that she was not taking this medication at her visit on 02/02/2024. Want to clarify if she needs the refill.

## 2024-02-08 ENCOUNTER — OFFICE VISIT (OUTPATIENT)
Dept: PAIN MANAGEMENT | Age: 59
End: 2024-02-08
Payer: COMMERCIAL

## 2024-02-08 VITALS
WEIGHT: 183 LBS | BODY MASS INDEX: 35.93 KG/M2 | HEIGHT: 60 IN | DIASTOLIC BLOOD PRESSURE: 70 MMHG | SYSTOLIC BLOOD PRESSURE: 126 MMHG | HEART RATE: 62 BPM

## 2024-02-08 DIAGNOSIS — M46.1 SI (SACROILIAC) JOINT INFLAMMATION (HCC): Primary | ICD-10-CM

## 2024-02-08 DIAGNOSIS — M53.3 SACROILIAC JOINT DYSFUNCTION: ICD-10-CM

## 2024-02-08 DIAGNOSIS — M47.816 LUMBAR SPONDYLOSIS: ICD-10-CM

## 2024-02-08 DIAGNOSIS — M54.17 LUMBOSACRAL RADICULITIS: ICD-10-CM

## 2024-02-08 PROCEDURE — G8417 CALC BMI ABV UP PARAM F/U: HCPCS | Performed by: NURSE PRACTITIONER

## 2024-02-08 PROCEDURE — 3074F SYST BP LT 130 MM HG: CPT | Performed by: NURSE PRACTITIONER

## 2024-02-08 PROCEDURE — 99214 OFFICE O/P EST MOD 30 MIN: CPT | Performed by: NURSE PRACTITIONER

## 2024-02-08 PROCEDURE — G8427 DOCREV CUR MEDS BY ELIG CLIN: HCPCS | Performed by: NURSE PRACTITIONER

## 2024-02-08 PROCEDURE — 1036F TOBACCO NON-USER: CPT | Performed by: NURSE PRACTITIONER

## 2024-02-08 PROCEDURE — G8482 FLU IMMUNIZE ORDER/ADMIN: HCPCS | Performed by: NURSE PRACTITIONER

## 2024-02-08 PROCEDURE — 3078F DIAST BP <80 MM HG: CPT | Performed by: NURSE PRACTITIONER

## 2024-02-08 PROCEDURE — 3017F COLORECTAL CA SCREEN DOC REV: CPT | Performed by: NURSE PRACTITIONER

## 2024-02-08 ASSESSMENT — ENCOUNTER SYMPTOMS: BACK PAIN: 1

## 2024-02-08 NOTE — PROGRESS NOTES
Normal.   Skin:     General: Skin is warm.      Coloration: Skin is not pale.      Findings: No erythema or rash.   Neurological:      Mental Status: She is alert and oriented to person, place, and time. She is not disoriented.      Cranial Nerves: Cranial nerves 2-12 are intact. No cranial nerve deficit.      Sensory: No sensory deficit.      Motor: Weakness present. No atrophy or abnormal muscle tone.      Coordination: Coordination normal.      Gait: Gait abnormal.      Deep Tendon Reflexes: Babinski sign absent on the right side.      Reflex Scores:       Patellar reflexes are 1+ on the right side and 2+ on the left side.       Achilles reflexes are 1+ on the right side and 2+ on the left side.     Comments: Motor 4/5 RLE Positive RLE SLR   Psychiatric:         Attention and Perception: Attention and perception normal. She is attentive.         Mood and Affect: Mood and affect normal. Mood is not anxious or depressed. Affect is not labile, blunt, angry or inappropriate.         Speech: Speech normal. She is communicative. Speech is not rapid and pressured, delayed, slurred or tangential.         Behavior: Behavior normal. Behavior is not agitated, slowed, aggressive, withdrawn, hyperactive or combative. Behavior is cooperative.         Thought Content: Thought content normal. Thought content is not paranoid or delusional. Thought content does not include homicidal or suicidal ideation. Thought content does not include homicidal or suicidal plan.         Cognition and Memory: Cognition and memory normal. Memory is not impaired. She does not exhibit impaired recent memory or impaired remote memory.         Judgment: Judgment normal. Judgment is not impulsive or inappropriate.       VITO  Patricks test  positive  Yeoman's or Gaenslen's  positive  Kemps  positive       Assessment:     1. SI (sacroiliac) joint inflammation (HCC)    2. Sacroiliac joint dysfunction    3. Lumbosacral radiculitis    4. Lumbar

## 2024-02-09 ENCOUNTER — TELEPHONE (OUTPATIENT)
Dept: PHYSICAL MEDICINE AND REHAB | Age: 59
End: 2024-02-09

## 2024-02-09 NOTE — TELEPHONE ENCOUNTER
Pt denies taking any blood thinners except ASA 81 mg. (no cardiac events in the last 6 months) prior to scheduling procedure.

## 2024-02-28 NOTE — TELEPHONE ENCOUNTER
Patient called. She stated Dr. Leopold increased her Ozempic to 2mg. RX was sent for the 1mg. The dial does not go pass 1mg to give her self 2mg in one shot. She would prefer not have to give herself two shots in one day.     She is requesting a new RX for the 2mg pen. RX pended.

## 2024-03-19 ENCOUNTER — APPOINTMENT (OUTPATIENT)
Dept: GENERAL RADIOLOGY | Age: 59
End: 2024-03-19
Attending: PAIN MEDICINE
Payer: COMMERCIAL

## 2024-03-19 ENCOUNTER — HOSPITAL ENCOUNTER (OUTPATIENT)
Age: 59
Setting detail: OUTPATIENT SURGERY
Discharge: HOME | End: 2024-03-19
Attending: PAIN MEDICINE | Admitting: PAIN MEDICINE
Payer: COMMERCIAL

## 2024-03-19 VITALS
OXYGEN SATURATION: 94 % | DIASTOLIC BLOOD PRESSURE: 67 MMHG | TEMPERATURE: 97.1 F | HEIGHT: 60 IN | SYSTOLIC BLOOD PRESSURE: 131 MMHG | BODY MASS INDEX: 35.46 KG/M2 | WEIGHT: 180.6 LBS | HEART RATE: 73 BPM | RESPIRATION RATE: 18 BRPM

## 2024-03-19 LAB — GLUCOSE BLD STRIP.AUTO-MCNC: 100 MG/DL (ref 70–108)

## 2024-03-19 PROCEDURE — 7100000011 HC PHASE II RECOVERY - ADDTL 15 MIN: Performed by: PAIN MEDICINE

## 2024-03-19 PROCEDURE — 6360000002 HC RX W HCPCS: Performed by: PAIN MEDICINE

## 2024-03-19 PROCEDURE — 6360000004 HC RX CONTRAST MEDICATION: Performed by: PAIN MEDICINE

## 2024-03-19 PROCEDURE — 99152 MOD SED SAME PHYS/QHP 5/>YRS: CPT | Performed by: PAIN MEDICINE

## 2024-03-19 PROCEDURE — 82948 REAGENT STRIP/BLOOD GLUCOSE: CPT

## 2024-03-19 PROCEDURE — 2709999900 HC NON-CHARGEABLE SUPPLY: Performed by: PAIN MEDICINE

## 2024-03-19 PROCEDURE — 2500000003 HC RX 250 WO HCPCS: Performed by: PAIN MEDICINE

## 2024-03-19 PROCEDURE — 64451 NJX AA&/STRD NRV NRVTG SI JT: CPT | Performed by: PAIN MEDICINE

## 2024-03-19 PROCEDURE — 3600000054 HC PAIN LEVEL 3 BASE: Performed by: PAIN MEDICINE

## 2024-03-19 PROCEDURE — 7100000010 HC PHASE II RECOVERY - FIRST 15 MIN: Performed by: PAIN MEDICINE

## 2024-03-19 RX ORDER — LIDOCAINE HYDROCHLORIDE 20 MG/ML
INJECTION, SOLUTION INFILTRATION; PERINEURAL PRN
Status: DISCONTINUED | OUTPATIENT
Start: 2024-03-19 | End: 2024-03-19 | Stop reason: ALTCHOICE

## 2024-03-19 RX ORDER — BUPIVACAINE HYDROCHLORIDE 5 MG/ML
INJECTION, SOLUTION PERINEURAL PRN
Status: DISCONTINUED | OUTPATIENT
Start: 2024-03-19 | End: 2024-03-19 | Stop reason: ALTCHOICE

## 2024-03-19 RX ORDER — MIDAZOLAM HYDROCHLORIDE 1 MG/ML
INJECTION INTRAMUSCULAR; INTRAVENOUS PRN
Status: DISCONTINUED | OUTPATIENT
Start: 2024-03-19 | End: 2024-03-19 | Stop reason: ALTCHOICE

## 2024-03-19 RX ORDER — FENTANYL CITRATE 50 UG/ML
INJECTION, SOLUTION INTRAMUSCULAR; INTRAVENOUS PRN
Status: DISCONTINUED | OUTPATIENT
Start: 2024-03-19 | End: 2024-03-19 | Stop reason: ALTCHOICE

## 2024-03-19 RX ORDER — METHYLPREDNISOLONE ACETATE 80 MG/ML
INJECTION, SUSPENSION INTRA-ARTICULAR; INTRALESIONAL; INTRAMUSCULAR; SOFT TISSUE PRN
Status: DISCONTINUED | OUTPATIENT
Start: 2024-03-19 | End: 2024-03-19 | Stop reason: ALTCHOICE

## 2024-03-19 ASSESSMENT — PAIN - FUNCTIONAL ASSESSMENT
PAIN_FUNCTIONAL_ASSESSMENT: NONE - DENIES PAIN
PAIN_FUNCTIONAL_ASSESSMENT: NONE - DENIES PAIN

## 2024-03-19 NOTE — PRE SEDATION
documentation record)  4. Formulation and discussion of sedation/procedure plan, risks, and expectations with patient and/or responsible adult completed.  5. Patient examined immediately prior to the procedure. (Refer to nursing sedation/analgesia documentation record)    Hung Nicholas MD  Electronically signed 3/19/2024 at 8:35 AM

## 2024-03-19 NOTE — OP NOTE
Nas Seda discharged to home accompanied by family member.   Patient provided with the following educational materials upon discharge: discharge summary, discharge instructions, medications and follow up appointments reviewed with patient. Patient verbalized understanding.    Operative Note      Patient: Suze Atkins  YOB: 1965  MRN: [id number]    Date of Procedure: 3/19/2024      Pre-operative Diagnosis:  SI joint inflammation     Post-operative Diagnosis:  SI joint inflammation     Procedure:   Bilateral SI joint block under fluoroscopy    Anesthesia: IV sedation     Procedure Description:  After having signed the informed consent, the patient was placed in the prone position.  The patient's low back and buttocks was prepped with chloraprep solution, and draped in a sterile fashion. A total of 2-3  ml of 1% lidocaine was used to anesthetize the skin and underlying tissues at each level. Next, 3.5 inch 22 g spinal needles were advanced to the anatomic location of the L5 primary dorsal ramus at the junction of the superior articular process and sacral ala utilizing intermittent fluoroscopy, as well as the S1, S2, and S3 lateral branch nerves at the lateral border of the S1, S2, and S3 posterior/dorsal foramen. Then, a 1 cc mixture of 80 mg depo-medrol and 0.5% bupivacaine was injected at each site. The needles were removed without any complications. The patient tolerated the procedure well, was transported to the recovery room and observed for 15 minutes and discharged in an ambulatory fashion. No immediate reported complications.     Procedural Complications: None  Estimated Blood Loss: 0 mL        Hung Nicholas MD

## 2024-03-19 NOTE — PROGRESS NOTES
916-Patient to Phase II via cart. Report received from Tash RUIZ. Patient drowsy but responsive.Vitals obtained and stable. Respirations even and unlabored on room air. Patient denies pain, nausea, numbness and tingling. Patient able to move all extremities. No drainage noted at injection sites. Patient instructed to stay in bed. Instructed on call light use.     920- Pt eating and drinking at this time.    934- Pt's IV removed at this time.    939- Pt getting changed at this time.    950- Patient meets discharge criteria.  Discharged in stable condition with responsible . All belongings given to patient. Patient ambulated to car with assistance from RN. Patient tolerated well.

## 2024-03-19 NOTE — H&P
respiratory distress.      Breath sounds: Normal breath sounds. No wheezing or rales.   Chest:      Chest wall: No tenderness.   Abdominal:      General: Bowel sounds are normal. There is no distension.      Palpations: Abdomen is soft.      Tenderness: There is no abdominal tenderness. There is no guarding or rebound.   Musculoskeletal:         General: Tenderness present.      Right shoulder: Normal.      Left shoulder: Normal.      Cervical back: Full passive range of motion without pain, normal range of motion and neck supple. Bony tenderness present. No edema, erythema or rigidity. No muscular tenderness. Normal range of motion.      Thoracic back: Tenderness and bony tenderness present.      Lumbar back: Spasms, tenderness and bony tenderness present. Decreased range of motion. Positive right straight leg raise test.        Back:       Right hip: Tenderness and bony tenderness present. Decreased range of motion. Decreased strength.      Left hip: Bony tenderness present.      Right knee: Normal.      Left knee: Normal.   Skin:     General: Skin is warm.      Coloration: Skin is not pale.      Findings: No erythema or rash.   Neurological:      Mental Status: She is alert and oriented to person, place, and time. She is not disoriented.      Cranial Nerves: Cranial nerves 2-12 are intact. No cranial nerve deficit.      Sensory: No sensory deficit.      Motor: Weakness present. No atrophy or abnormal muscle tone.      Coordination: Coordination normal.      Gait: Gait abnormal.      Deep Tendon Reflexes: Babinski sign absent on the right side.      Reflex Scores:       Patellar reflexes are 1+ on the right side and 2+ on the left side.       Achilles reflexes are 1+ on the right side and 2+ on the left side.     Comments: Motor 4/5 RLE Positive RLE SLR   Psychiatric:         Attention and Perception: Attention and perception normal. She is attentive.         Mood and Affect: Mood and affect normal. Mood is not

## 2024-03-19 NOTE — POST SEDATION
IV sedation was used during the procedure:  - Moderate intravenous conscious sedation was supervised by Dr. Jade  - The patient was independently monitored by a Registered Nurse assigned to the procedure room  - Monitoring included automated blood pressure, continuous EKG, and continuous pulse oximetry  - The detailed conscious record is permanently stored in the Hospital Information System  - The following is the conscious sedation record:  Start Time: 0901  End Time : 0915  Duration: 15 minutes   Medications Administered: 1 mg Versed, 100 mcg Fentanyl  Complications: none  EBL-0

## 2024-03-29 ENCOUNTER — OFFICE VISIT (OUTPATIENT)
Dept: FAMILY MEDICINE CLINIC | Age: 59
End: 2024-03-29
Payer: COMMERCIAL

## 2024-03-29 VITALS
SYSTOLIC BLOOD PRESSURE: 122 MMHG | WEIGHT: 177.4 LBS | OXYGEN SATURATION: 98 % | DIASTOLIC BLOOD PRESSURE: 76 MMHG | HEART RATE: 70 BPM | HEIGHT: 60 IN | BODY MASS INDEX: 34.83 KG/M2

## 2024-03-29 DIAGNOSIS — E66.01 SEVERE OBESITY (BMI 35.0-35.9 WITH COMORBIDITY) (HCC): ICD-10-CM

## 2024-03-29 DIAGNOSIS — M46.1 SACROILIAC INFLAMMATION (HCC): ICD-10-CM

## 2024-03-29 DIAGNOSIS — E11.69 TYPE 2 DIABETES MELLITUS WITH OTHER SPECIFIED COMPLICATION, WITHOUT LONG-TERM CURRENT USE OF INSULIN (HCC): Primary | ICD-10-CM

## 2024-03-29 DIAGNOSIS — F34.1 PERSISTENT DEPRESSIVE DISORDER: ICD-10-CM

## 2024-03-29 DIAGNOSIS — I10 ESSENTIAL HYPERTENSION: ICD-10-CM

## 2024-03-29 LAB — HBA1C MFR BLD: 4.4 %

## 2024-03-29 PROCEDURE — 99214 OFFICE O/P EST MOD 30 MIN: CPT | Performed by: FAMILY MEDICINE

## 2024-03-29 PROCEDURE — 3074F SYST BP LT 130 MM HG: CPT | Performed by: FAMILY MEDICINE

## 2024-03-29 PROCEDURE — G8417 CALC BMI ABV UP PARAM F/U: HCPCS | Performed by: FAMILY MEDICINE

## 2024-03-29 PROCEDURE — 1036F TOBACCO NON-USER: CPT | Performed by: FAMILY MEDICINE

## 2024-03-29 PROCEDURE — 3017F COLORECTAL CA SCREEN DOC REV: CPT | Performed by: FAMILY MEDICINE

## 2024-03-29 PROCEDURE — G8482 FLU IMMUNIZE ORDER/ADMIN: HCPCS | Performed by: FAMILY MEDICINE

## 2024-03-29 PROCEDURE — 3078F DIAST BP <80 MM HG: CPT | Performed by: FAMILY MEDICINE

## 2024-03-29 PROCEDURE — 3044F HG A1C LEVEL LT 7.0%: CPT | Performed by: FAMILY MEDICINE

## 2024-03-29 PROCEDURE — 83036 HEMOGLOBIN GLYCOSYLATED A1C: CPT | Performed by: FAMILY MEDICINE

## 2024-03-29 PROCEDURE — G8427 DOCREV CUR MEDS BY ELIG CLIN: HCPCS | Performed by: FAMILY MEDICINE

## 2024-03-29 PROCEDURE — 2022F DILAT RTA XM EVC RTNOPTHY: CPT | Performed by: FAMILY MEDICINE

## 2024-03-29 RX ORDER — BUSPIRONE HYDROCHLORIDE 15 MG/1
15 TABLET ORAL 3 TIMES DAILY
Qty: 270 TABLET | Refills: 3 | Status: SHIPPED | OUTPATIENT
Start: 2024-03-29

## 2024-03-29 SDOH — ECONOMIC STABILITY: FOOD INSECURITY: WITHIN THE PAST 12 MONTHS, YOU WORRIED THAT YOUR FOOD WOULD RUN OUT BEFORE YOU GOT MONEY TO BUY MORE.: NEVER TRUE

## 2024-03-29 SDOH — ECONOMIC STABILITY: FOOD INSECURITY: WITHIN THE PAST 12 MONTHS, THE FOOD YOU BOUGHT JUST DIDN'T LAST AND YOU DIDN'T HAVE MONEY TO GET MORE.: NEVER TRUE

## 2024-03-29 SDOH — ECONOMIC STABILITY: INCOME INSECURITY: HOW HARD IS IT FOR YOU TO PAY FOR THE VERY BASICS LIKE FOOD, HOUSING, MEDICAL CARE, AND HEATING?: NOT HARD AT ALL

## 2024-03-29 ASSESSMENT — ENCOUNTER SYMPTOMS
DIARRHEA: 0
CONSTIPATION: 0
ABDOMINAL PAIN: 0
SORE THROAT: 0
TROUBLE SWALLOWING: 0
VOMITING: 0
COUGH: 0
SHORTNESS OF BREATH: 0

## 2024-03-29 NOTE — PROGRESS NOTES
[facility] [place] [facility]  [address]  [place] [address]  Dept: [phone]     SUBJECTIVE     Suze Atkins is a 59 y.o.female    Pt presents for follow up of medications.     Pt feeling ok since last visit- interval history and any new issues noted below:     Still on Ozempic 1 mg weekly- feeling well.  A1c improved to non diabetic range.  She plans to increase to 2 mg dose next week as previously discussed.  No GI upset reported    Patient Active Problem List   Diagnosis    Anxiety    Depression    OCD (obsessive compulsive disorder)    Essential hypertension    Other constipation    Adnexal mass    S/P exploratory laparotomy    Peripheral polyneuropathy    Lumbar radiculopathy    BOWERS (nonalcoholic steatohepatitis)    Diabetes mellitus (HCC)    Spinal stenosis of lumbar region without neurogenic claudication    Sacroiliac inflammation (HCC)    Severe obesity (BMI 35.0-35.9 with comorbidity) (HCC)    Restless legs syndrome (RLS)       Current Outpatient Medications   Medication Sig Dispense Refill    omeprazole (PRILOSEC) 40 MG delayed release capsule take 1 capsule by mouth daily 90 capsule 3    gabapentin (NEURONTIN) 100 MG capsule Take 1 capsule by mouth 2 times daily AND 2 capsules nightly. Do all this for 180 days. 120 capsule 5    Semaglutide, 1 MG/DOSE, 4 MG/3ML SOPN Inject 2 mg into the skin once a week 6 mL 3    ARIPiprazole (ABILIFY) 5 MG tablet take 1 tablet by mouth once daily 30 tablet 3    metoprolol succinate (TOPROL XL) 25 MG extended release tablet take 1 tablet by mouth once daily 30 tablet 11    hydroCHLOROthiazide (HYDRODIURIL) 25 MG tablet take 1 tablet by mouth once daily 30 tablet 11    escitalopram (LEXAPRO) 20 MG tablet Take 1.5 tablets by mouth daily 135 tablet 3    olopatadine (PATADAY) 0.2 % SOLN ophthalmic solution Place 1 drop into both eyes daily 2.5 mL 3    ASPIRIN LOW DOSE 81 MG EC tablet take 1 tablet by mouth once daily 90 tablet 3

## 2024-04-10 DIAGNOSIS — F34.1 PERSISTENT DEPRESSIVE DISORDER: ICD-10-CM

## 2024-04-10 RX ORDER — ARIPIPRAZOLE 5 MG/1
TABLET ORAL
Qty: 30 TABLET | Refills: 3 | Status: SHIPPED | OUTPATIENT
Start: 2024-04-10

## 2024-04-10 NOTE — TELEPHONE ENCOUNTER
Last appointment this department: 9/15/2023  Next appointment this department: Visit date not found       Parent(s)

## 2024-05-02 RX ORDER — AMLODIPINE BESYLATE 10 MG/1
TABLET ORAL
Qty: 90 TABLET | Refills: 0 | Status: SHIPPED | OUTPATIENT
Start: 2024-05-02

## 2024-05-02 RX ORDER — ATORVASTATIN CALCIUM 20 MG/1
TABLET, FILM COATED ORAL
Qty: 90 TABLET | Refills: 0 | Status: SHIPPED | OUTPATIENT
Start: 2024-05-02

## 2024-05-02 RX ORDER — ASPIRIN 81 MG/1
TABLET, COATED ORAL
Qty: 90 TABLET | Refills: 0 | Status: SHIPPED | OUTPATIENT
Start: 2024-05-02

## 2024-05-09 ENCOUNTER — HOSPITAL ENCOUNTER (OUTPATIENT)
Dept: GENERAL RADIOLOGY | Age: 59
Discharge: HOME | End: 2024-05-09
Payer: COMMERCIAL

## 2024-05-09 ENCOUNTER — OFFICE VISIT (OUTPATIENT)
Dept: PAIN MANAGEMENT | Age: 59
End: 2024-05-09
Payer: COMMERCIAL

## 2024-05-09 ENCOUNTER — HOSPITAL ENCOUNTER (OUTPATIENT)
Age: 59
Discharge: HOME | End: 2024-05-09
Payer: COMMERCIAL

## 2024-05-09 VITALS
HEART RATE: 62 BPM | DIASTOLIC BLOOD PRESSURE: 64 MMHG | SYSTOLIC BLOOD PRESSURE: 114 MMHG | HEIGHT: 60 IN | BODY MASS INDEX: 33.77 KG/M2 | WEIGHT: 172 LBS

## 2024-05-09 DIAGNOSIS — M46.1 SI (SACROILIAC) JOINT INFLAMMATION (HCC): ICD-10-CM

## 2024-05-09 DIAGNOSIS — M47.816 LUMBAR SPONDYLOSIS: ICD-10-CM

## 2024-05-09 DIAGNOSIS — M70.61 TROCHANTERIC BURSITIS, RIGHT HIP: ICD-10-CM

## 2024-05-09 DIAGNOSIS — M53.3 SACROILIAC JOINT DYSFUNCTION: ICD-10-CM

## 2024-05-09 DIAGNOSIS — M16.11 PRIMARY OSTEOARTHRITIS OF RIGHT HIP: ICD-10-CM

## 2024-05-09 DIAGNOSIS — M54.17 LUMBOSACRAL RADICULITIS: ICD-10-CM

## 2024-05-09 DIAGNOSIS — M16.11 PRIMARY OSTEOARTHRITIS OF RIGHT HIP: Primary | ICD-10-CM

## 2024-05-09 PROCEDURE — 3017F COLORECTAL CA SCREEN DOC REV: CPT | Performed by: NURSE PRACTITIONER

## 2024-05-09 PROCEDURE — 1036F TOBACCO NON-USER: CPT | Performed by: NURSE PRACTITIONER

## 2024-05-09 PROCEDURE — 3078F DIAST BP <80 MM HG: CPT | Performed by: NURSE PRACTITIONER

## 2024-05-09 PROCEDURE — G8427 DOCREV CUR MEDS BY ELIG CLIN: HCPCS | Performed by: NURSE PRACTITIONER

## 2024-05-09 PROCEDURE — 73502 X-RAY EXAM HIP UNI 2-3 VIEWS: CPT

## 2024-05-09 PROCEDURE — 3074F SYST BP LT 130 MM HG: CPT | Performed by: NURSE PRACTITIONER

## 2024-05-09 PROCEDURE — G8417 CALC BMI ABV UP PARAM F/U: HCPCS | Performed by: NURSE PRACTITIONER

## 2024-05-09 PROCEDURE — 99214 OFFICE O/P EST MOD 30 MIN: CPT | Performed by: NURSE PRACTITIONER

## 2024-05-09 ASSESSMENT — ENCOUNTER SYMPTOMS: BACK PAIN: 1

## 2024-05-09 NOTE — PATIENT INSTRUCTIONS
instructions on the label.  Avoid strenuous activities for several days. In particular, avoid ones that put stress on the area where you got the shot.  If you have dressings over the area, keep them clean and dry. You may remove them when your doctor tells you to.  When should you call for help?   Call your doctor now or seek immediate medical care if:    You have signs of infection, such as:  Increased pain, swelling, warmth, or redness.  Red streaks leading from the site.  Pus draining from the site.  A fever.   Watch closely for changes in your health, and be sure to contact your doctor if you have any problems.  Current as of: July 17, 2023               Content Version: 14.0  © 2006-2024 [employer].   Care instructions adapted under license by [employer]. If you have questions about a medical condition or this instruction, always ask your healthcare professional. [employer] disclaims any warranty or liability for your use of this information.

## 2024-05-09 NOTE — PROGRESS NOTES
Functionality Assessment/Goals Worksheet     On a scale of 0 (Does not Interfere) to 10 (Completely Interferes)     1.  Which number describes how during the past week pain has interfered with           the following:  A.  General Activity:  4  B.  Mood: 2  C.  Walking Ability:  3  D.  Normal Work (Includes both work outside the home and housework):  4  E.  Relations with Other People:   3  F.  Sleep:   2  G.  Enjoyment of Life:   4    2.  Patient Prefers to Take their Pain Medications:     []  On a regular basis   []  Only when necessary    [x]  Does not take pain medications    3.  What are the Patient's Goals/Expectations for Visiting Pain Management?     [x]  Learn about my pain    []  Receive Medication   []  Physical Therapy     []  Treat Depression   []  Receive Injections    []  Treat Sleep   []  Deal with Anxiety and Stress   []  Treat Opoid Dependence/Addiction   []  Other:     HPI:     ChiefComplaint: Lumbar back pain, Right leg pain, Left leg pain, and SI pain      F/U Bilateral SI  ADVANCED injections under Fluoroscopy IV SEDATION  3/19/2024  states she doesn't fell she got much relief form the injection.  Her main pain complaint is her right hip region mostly when she is laying flat in bed,       Her main pain complaint is her Bilateral SI greater on right side constant  low lumbar pain.  She has increased pain with sitting  getting up and down walking standing.  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  She has increased low lumbar sacral  pain stiffness. Sharp stabbing pains in Right SI at times travels down right thigh stops at knee.       HPI  New pt here for low back pain. She denies any prior lumbar surgery or injections. She has not seen a spine surgeon for this lumbar pain and RLE radicular pain.  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 No falls trauma or MVC in past.  She has had some PT within last 6 months  no relief per pt. She had EMG BLE and Lumbar CT and MRI

## 2024-05-09 NOTE — PROGRESS NOTES
Functionality Assessment/Goals Worksheet     On a scale of 0 (Does not Interfere) to 10 (Completely Interferes)     1.  Which number describes how during the past week pain has interfered with           the following:  A.  General Activity:  5  B.  Mood: 5  C.  Walking Ability:  4  D.  Normal Work (Includes both work outside the home and housework):  4  E.  Relations with Other People:   4  F.  Sleep:   6  G.  Enjoyment of Life:   5    2.  Patient Prefers to Take their Pain Medications:     []  On a regular basis   []  Only when necessary    [x]  Does not take pain medications    3.  What are the Patient's Goals/Expectations for Visiting Pain Management?     []  Learn about my pain    []  Receive Medication   []  Physical Therapy     []  Treat Depression   [x]  Receive Injections    []  Treat Sleep   []  Deal with Anxiety and Stress   []  Treat Opoid Dependence/Addiction   []  Other:

## 2024-05-17 DIAGNOSIS — M79.89 BILATERAL SWELLING OF FEET: ICD-10-CM

## 2024-05-17 RX ORDER — HYDROCHLOROTHIAZIDE 25 MG/1
TABLET ORAL
Qty: 30 TABLET | Refills: 11 | Status: SHIPPED | OUTPATIENT
Start: 2024-05-17

## 2024-06-13 ENCOUNTER — OFFICE VISIT (OUTPATIENT)
Dept: PAIN MANAGEMENT | Age: 59
End: 2024-06-13
Payer: COMMERCIAL

## 2024-06-13 VITALS
DIASTOLIC BLOOD PRESSURE: 72 MMHG | WEIGHT: 171.96 LBS | BODY MASS INDEX: 33.76 KG/M2 | SYSTOLIC BLOOD PRESSURE: 128 MMHG | HEIGHT: 60 IN

## 2024-06-13 DIAGNOSIS — M46.1 SI (SACROILIAC) JOINT INFLAMMATION (HCC): ICD-10-CM

## 2024-06-13 DIAGNOSIS — M53.3 SACROILIAC JOINT DYSFUNCTION: ICD-10-CM

## 2024-06-13 DIAGNOSIS — M47.816 LUMBAR SPONDYLOSIS: ICD-10-CM

## 2024-06-13 DIAGNOSIS — M16.11 PRIMARY OSTEOARTHRITIS OF RIGHT HIP: ICD-10-CM

## 2024-06-13 DIAGNOSIS — M54.17 LUMBOSACRAL RADICULITIS: ICD-10-CM

## 2024-06-13 DIAGNOSIS — M70.61 TROCHANTERIC BURSITIS OF RIGHT HIP: Primary | ICD-10-CM

## 2024-06-13 DIAGNOSIS — M19.011 PRIMARY OSTEOARTHRITIS OF BOTH SHOULDERS: ICD-10-CM

## 2024-06-13 DIAGNOSIS — M19.012 PRIMARY OSTEOARTHRITIS OF BOTH SHOULDERS: ICD-10-CM

## 2024-06-13 PROCEDURE — G8417 CALC BMI ABV UP PARAM F/U: HCPCS | Performed by: NURSE PRACTITIONER

## 2024-06-13 PROCEDURE — 99214 OFFICE O/P EST MOD 30 MIN: CPT | Performed by: NURSE PRACTITIONER

## 2024-06-13 PROCEDURE — 3074F SYST BP LT 130 MM HG: CPT | Performed by: NURSE PRACTITIONER

## 2024-06-13 PROCEDURE — G8427 DOCREV CUR MEDS BY ELIG CLIN: HCPCS | Performed by: NURSE PRACTITIONER

## 2024-06-13 PROCEDURE — 3017F COLORECTAL CA SCREEN DOC REV: CPT | Performed by: NURSE PRACTITIONER

## 2024-06-13 PROCEDURE — 3078F DIAST BP <80 MM HG: CPT | Performed by: NURSE PRACTITIONER

## 2024-06-13 PROCEDURE — 1036F TOBACCO NON-USER: CPT | Performed by: NURSE PRACTITIONER

## 2024-06-13 ASSESSMENT — ENCOUNTER SYMPTOMS: BACK PAIN: 1

## 2024-06-13 NOTE — PROGRESS NOTES
[facility] [place] PROFESSIONAL SERVS  [facility] NEUROSCIENCE AND REHABILITATION [address]  [facility] [address]  Dept: [phone]  Dept Fax: [phone]  Loc: [phone]    Visit Date: 6/13/2024    Functionality Assessment/Goals Worksheet     On a scale of 0 (Does not Interfere) to 10 (Completely Interferes)     1.  Which number describes how during the past week pain has interfered with       the following:  A.  General Activity:  5  B.  Mood: 6  C.  Walking Ability:  1  D.  Normal Work (Includes both work outside the home and housework):  6  E.  Relations with Other People:   0  F.  Sleep:   8  G.  Enjoyment of Life:   7    2.  Patient Prefers to Take their Pain Medications:     []  On a regular basis   []  Only when necessary    [x]  Does not take pain medications    3.  What are the Patient's Goals/Expectations for Visiting Pain Management?     [x]  Learn about my pain    []  Receive Medication   []  Physical Therapy     []  Treat Depression   []  Receive Injections    []  Treat Sleep   []  Deal with Anxiety and Stress   []  Treat Opoid Dependence/Addiction   []  Other:  
Decreased strength.      Left shoulder: Tenderness, bony tenderness and crepitus present. Decreased range of motion. Decreased strength.      Cervical back: Full passive range of motion without pain, normal range of motion and neck supple. Bony tenderness present. No edema, erythema or rigidity. No muscular tenderness. Normal range of motion.      Thoracic back: Tenderness and bony tenderness present.      Lumbar back: Spasms, tenderness and bony tenderness present. Decreased range of motion. Positive right straight leg raise test.        Back:       Right hip: Tenderness and bony tenderness present. Decreased range of motion. Decreased strength.      Left hip: Bony tenderness present.      Right knee: Normal.      Left knee: Normal.   Skin:     General: Skin is warm.      Coloration: Skin is not pale.      Findings: No erythema or rash.   Neurological:      Mental Status: She is alert and oriented to person, place, and time. She is not disoriented.      Cranial Nerves: Cranial nerves 2-12 are intact. No cranial nerve deficit.      Sensory: No sensory deficit.      Motor: Weakness present. No atrophy or abnormal muscle tone.      Coordination: Coordination normal.      Gait: Gait abnormal.      Deep Tendon Reflexes: Babinski sign absent on the right side.      Reflex Scores:       Patellar reflexes are 1+ on the right side and 2+ on the left side.       Achilles reflexes are 1+ on the right side and 2+ on the left side.     Comments: Motor 4/5 RLE Positive RLE SLR   Psychiatric:         Attention and Perception: Attention and perception normal. She is attentive.         Mood and Affect: Mood and affect normal. Mood is not anxious or depressed. Affect is not labile, blunt, angry or inappropriate.         Speech: Speech normal. She is communicative. Speech is not rapid and pressured, delayed, slurred or tangential.         Behavior: Behavior normal. Behavior is not agitated, slowed, aggressive, withdrawn, hyperactive

## 2024-06-13 NOTE — PATIENT INSTRUCTIONS
Multidisciplinary Pain Management:   In the presence of complex, chronic, and multi-factorial pain, the importance of a multidisciplinary approach to pain management in the patient’s management regimen was emphasized and discussed in great detail.   PHYSICAL THERAPY: Patient is advised to see a physical therapist for gentle stretching exercises and conditioning exercises for management of pain.   PSYCHOLOGY: Patient is advised to see a clinical pain psychologist, for the psychosocial aspect of pain care through coping skills, relaxation strategies, cognitive group therapy etc.   OBESITY: Patient is advised to seek nutrition consult to help in managing their weight to decrease its impact on pain.   The patient was also advised to continue physical therapy and stretching exercises at home and cognitive behavioral and/or group therapy.  Patient Education     Patient Education        Learning About a Hip Bursa Injection  What is a hip bursa injection?     A bursa is a small sac of fluid that cushions an area between tendons and bones. The bursa on the outer side of the hip bone is called the trochanteric (say \"WDZH-gmd-UOHG-ik\") bursa. Sometimes it can become swollen and painful. This condition is called bursitis.  An injection into the bursa is a shot of medicine to reduce pain and swelling. The medicines may include pain relievers and steroid medicines. A steroid shot can sometimes help with short-term pain relief when other treatments haven't worked.  How is a hip bursa injection done?  First the area over the bursa will be cleaned. Your doctor may use a tiny needle to numb the skin over the area where you will get the injection.  If a tiny needle is used to numb the area, your doctor will use another needle to inject the medicine. Your doctor may use a pain reliever, a steroid, or both. You may feel some pressure or discomfort.  Your doctor may put ice on the area before you go home.  What can you expect after the

## 2024-06-25 ENCOUNTER — HOSPITAL ENCOUNTER (OUTPATIENT)
Dept: GENERAL RADIOLOGY | Age: 59
Discharge: HOME | End: 2024-06-25
Payer: COMMERCIAL

## 2024-06-25 ENCOUNTER — HOSPITAL ENCOUNTER (OUTPATIENT)
Age: 59
Discharge: HOME | End: 2024-06-25
Payer: COMMERCIAL

## 2024-06-25 ENCOUNTER — NURSE ONLY (OUTPATIENT)
Dept: LAB | Age: 59
End: 2024-06-25

## 2024-06-25 DIAGNOSIS — E11.69 TYPE 2 DIABETES MELLITUS WITH OTHER SPECIFIED COMPLICATION, WITHOUT LONG-TERM CURRENT USE OF INSULIN (HCC): ICD-10-CM

## 2024-06-25 DIAGNOSIS — M19.011 PRIMARY OSTEOARTHRITIS OF BOTH SHOULDERS: ICD-10-CM

## 2024-06-25 DIAGNOSIS — F34.1 PERSISTENT DEPRESSIVE DISORDER: ICD-10-CM

## 2024-06-25 DIAGNOSIS — M19.012 PRIMARY OSTEOARTHRITIS OF BOTH SHOULDERS: ICD-10-CM

## 2024-06-25 DIAGNOSIS — I10 ESSENTIAL HYPERTENSION: ICD-10-CM

## 2024-06-25 LAB
ALBUMIN SERPL BCG-MCNC: 4 G/DL (ref 3.5–5.1)
ALP SERPL-CCNC: 139 U/L (ref 38–126)
ALT SERPL W/O P-5'-P-CCNC: 36 U/L (ref 11–66)
ANION GAP SERPL CALC-SCNC: 11 MEQ/L (ref 8–16)
AST SERPL-CCNC: 45 U/L (ref 5–40)
BASOPHILS ABSOLUTE: 0.1 THOU/MM3 (ref 0–0.1)
BASOPHILS NFR BLD AUTO: 1.2 %
BILIRUB SERPL-MCNC: 0.9 MG/DL (ref 0.3–1.2)
BUN SERPL-MCNC: 8 MG/DL (ref 7–22)
CALCIUM SERPL-MCNC: 8.9 MG/DL (ref 8.5–10.5)
CHLORIDE SERPL-SCNC: 102 MEQ/L (ref 98–111)
CHOLEST SERPL-MCNC: 148 MG/DL (ref 100–199)
CO2 SERPL-SCNC: 27 MEQ/L (ref 23–33)
CREAT SERPL-MCNC: 0.6 MG/DL (ref 0.4–1.2)
CREAT UR-MCNC: 163.3 MG/DL
DEPRECATED RDW RBC AUTO: 47.2 FL (ref 35–45)
EOSINOPHIL NFR BLD AUTO: 1.4 %
EOSINOPHILS ABSOLUTE: 0.1 THOU/MM3 (ref 0–0.4)
ERYTHROCYTE [DISTWIDTH] IN BLOOD BY AUTOMATED COUNT: 14.1 % (ref 11.5–14.5)
GFR SERPL CREATININE-BSD FRML MDRD: > 90 ML/MIN/1.73M2
GLUCOSE SERPL-MCNC: 109 MG/DL (ref 70–108)
HCT VFR BLD AUTO: 43.4 % (ref 37–47)
HDLC SERPL-MCNC: 49 MG/DL
HGB BLD-MCNC: 15.2 GM/DL (ref 12–16)
IMM GRANULOCYTES # BLD AUTO: 0.02 THOU/MM3 (ref 0–0.07)
IMM GRANULOCYTES NFR BLD AUTO: 0.2 %
LDLC SERPL CALC-MCNC: 78 MG/DL
LYMPHOCYTES ABSOLUTE: 2.5 THOU/MM3 (ref 1–4.8)
LYMPHOCYTES NFR BLD AUTO: 29.1 %
MCH RBC QN AUTO: 32 PG (ref 26–33)
MCHC RBC AUTO-ENTMCNC: 35 GM/DL (ref 32.2–35.5)
MCV RBC AUTO: 91.4 FL (ref 81–99)
MICROALBUMIN UR-MCNC: < 1.2 MG/DL
MICROALBUMIN/CREAT RATIO PNL UR: 7 MG/G (ref 0–30)
MONOCYTES ABSOLUTE: 0.5 THOU/MM3 (ref 0.4–1.3)
MONOCYTES NFR BLD AUTO: 6.1 %
NEUTROPHILS ABSOLUTE: 5.3 THOU/MM3 (ref 1.8–7.7)
NEUTROPHILS NFR BLD AUTO: 62 %
NRBC BLD AUTO-RTO: 0 /100 WBC
PLATELET # BLD AUTO: 161 THOU/MM3 (ref 130–400)
PMV BLD AUTO: 10.5 FL (ref 9.4–12.4)
POTASSIUM SERPL-SCNC: 4 MEQ/L (ref 3.5–5.2)
PROT SERPL-MCNC: 7.2 G/DL (ref 6.1–8)
RBC # BLD AUTO: 4.75 MILL/MM3 (ref 4.2–5.4)
SODIUM SERPL-SCNC: 140 MEQ/L (ref 135–145)
TRIGL SERPL-MCNC: 106 MG/DL (ref 0–199)
TSH SERPL DL<=0.005 MIU/L-ACNC: 1.62 UIU/ML (ref 0.4–4.2)
WBC # BLD AUTO: 8.6 THOU/MM3 (ref 4.8–10.8)

## 2024-06-25 PROCEDURE — 73030 X-RAY EXAM OF SHOULDER: CPT

## 2024-06-26 LAB
DEPRECATED MEAN GLUCOSE BLD GHB EST-ACNC: 96 MG/DL (ref 70–126)
HBA1C MFR BLD HPLC: 5.2 % (ref 4.4–6.4)

## 2024-07-01 ENCOUNTER — OFFICE VISIT (OUTPATIENT)
Dept: FAMILY MEDICINE CLINIC | Age: 59
End: 2024-07-01
Payer: COMMERCIAL

## 2024-07-01 VITALS
BODY MASS INDEX: 37.22 KG/M2 | WEIGHT: 189.6 LBS | SYSTOLIC BLOOD PRESSURE: 118 MMHG | OXYGEN SATURATION: 99 % | DIASTOLIC BLOOD PRESSURE: 72 MMHG | HEART RATE: 70 BPM | HEIGHT: 60 IN

## 2024-07-01 DIAGNOSIS — F34.1 PERSISTENT DEPRESSIVE DISORDER: ICD-10-CM

## 2024-07-01 DIAGNOSIS — I10 ESSENTIAL HYPERTENSION: ICD-10-CM

## 2024-07-01 DIAGNOSIS — E66.01 SEVERE OBESITY (BMI 35.0-35.9 WITH COMORBIDITY) (HCC): ICD-10-CM

## 2024-07-01 DIAGNOSIS — M46.1 SACROILIAC INFLAMMATION (HCC): ICD-10-CM

## 2024-07-01 DIAGNOSIS — E11.69 TYPE 2 DIABETES MELLITUS WITH OTHER SPECIFIED COMPLICATION, WITHOUT LONG-TERM CURRENT USE OF INSULIN (HCC): Primary | ICD-10-CM

## 2024-07-01 DIAGNOSIS — K75.81 NASH (NONALCOHOLIC STEATOHEPATITIS): ICD-10-CM

## 2024-07-01 PROCEDURE — 3078F DIAST BP <80 MM HG: CPT | Performed by: FAMILY MEDICINE

## 2024-07-01 PROCEDURE — 3074F SYST BP LT 130 MM HG: CPT | Performed by: FAMILY MEDICINE

## 2024-07-01 PROCEDURE — 2022F DILAT RTA XM EVC RTNOPTHY: CPT | Performed by: FAMILY MEDICINE

## 2024-07-01 PROCEDURE — 3017F COLORECTAL CA SCREEN DOC REV: CPT | Performed by: FAMILY MEDICINE

## 2024-07-01 PROCEDURE — G8427 DOCREV CUR MEDS BY ELIG CLIN: HCPCS | Performed by: FAMILY MEDICINE

## 2024-07-01 PROCEDURE — 3044F HG A1C LEVEL LT 7.0%: CPT | Performed by: FAMILY MEDICINE

## 2024-07-01 PROCEDURE — 1036F TOBACCO NON-USER: CPT | Performed by: FAMILY MEDICINE

## 2024-07-01 PROCEDURE — G8417 CALC BMI ABV UP PARAM F/U: HCPCS | Performed by: FAMILY MEDICINE

## 2024-07-01 PROCEDURE — 99214 OFFICE O/P EST MOD 30 MIN: CPT | Performed by: FAMILY MEDICINE

## 2024-07-01 RX ORDER — TIRZEPATIDE 2.5 MG/.5ML
2.5 INJECTION, SOLUTION SUBCUTANEOUS WEEKLY
Qty: 2 ML | Refills: 2 | Status: SHIPPED | OUTPATIENT
Start: 2024-07-01

## 2024-07-01 RX ORDER — TRAZODONE HYDROCHLORIDE 50 MG/1
50-100 TABLET ORAL NIGHTLY
Qty: 60 TABLET | Refills: 3 | Status: SHIPPED | OUTPATIENT
Start: 2024-07-01

## 2024-07-01 RX ORDER — ARIPIPRAZOLE 10 MG/1
10 TABLET ORAL DAILY
Qty: 90 TABLET | Refills: 3 | Status: SHIPPED | OUTPATIENT
Start: 2024-07-01

## 2024-07-01 ASSESSMENT — PATIENT HEALTH QUESTIONNAIRE - PHQ9
3. TROUBLE FALLING OR STAYING ASLEEP: NEARLY EVERY DAY
6. FEELING BAD ABOUT YOURSELF - OR THAT YOU ARE A FAILURE OR HAVE LET YOURSELF OR YOUR FAMILY DOWN: MORE THAN HALF THE DAYS
5. POOR APPETITE OR OVEREATING: MORE THAN HALF THE DAYS
8. MOVING OR SPEAKING SO SLOWLY THAT OTHER PEOPLE COULD HAVE NOTICED. OR THE OPPOSITE, BEING SO FIGETY OR RESTLESS THAT YOU HAVE BEEN MOVING AROUND A LOT MORE THAN USUAL: NEARLY EVERY DAY
4. FEELING TIRED OR HAVING LITTLE ENERGY: NEARLY EVERY DAY
2. FEELING DOWN, DEPRESSED OR HOPELESS: MORE THAN HALF THE DAYS
SUM OF ALL RESPONSES TO PHQ QUESTIONS 1-9: 21
9. THOUGHTS THAT YOU WOULD BE BETTER OFF DEAD, OR OF HURTING YOURSELF: NOT AT ALL
7. TROUBLE CONCENTRATING ON THINGS, SUCH AS READING THE NEWSPAPER OR WATCHING TELEVISION: NEARLY EVERY DAY
10. IF YOU CHECKED OFF ANY PROBLEMS, HOW DIFFICULT HAVE THESE PROBLEMS MADE IT FOR YOU TO DO YOUR WORK, TAKE CARE OF THINGS AT HOME, OR GET ALONG WITH OTHER PEOPLE: EXTREMELY DIFFICULT
SUM OF ALL RESPONSES TO PHQ QUESTIONS 1-9: 21
SUM OF ALL RESPONSES TO PHQ QUESTIONS 1-9: 21
1. LITTLE INTEREST OR PLEASURE IN DOING THINGS: NEARLY EVERY DAY
SUM OF ALL RESPONSES TO PHQ9 QUESTIONS 1 & 2: 5
SUM OF ALL RESPONSES TO PHQ QUESTIONS 1-9: 21

## 2024-07-01 ASSESSMENT — ENCOUNTER SYMPTOMS
ABDOMINAL PAIN: 0
DIARRHEA: 0
SORE THROAT: 0
TROUBLE SWALLOWING: 0
COUGH: 0
VOMITING: 0
CONSTIPATION: 0
SHORTNESS OF BREATH: 0

## 2024-07-01 ASSESSMENT — COLUMBIA-SUICIDE SEVERITY RATING SCALE - C-SSRS
1. WITHIN THE PAST MONTH, HAVE YOU WISHED YOU WERE DEAD OR WISHED YOU COULD GO TO SLEEP AND NOT WAKE UP?: NO
2. HAVE YOU ACTUALLY HAD ANY THOUGHTS OF KILLING YOURSELF?: NO
6. HAVE YOU EVER DONE ANYTHING, STARTED TO DO ANYTHING, OR PREPARED TO DO ANYTHING TO END YOUR LIFE?: NO

## 2024-07-01 NOTE — PROGRESS NOTES
lexapro and buspirone dosing as is.  Close follow up over next 3-4 wks.     3. Sacroiliac inflammation (HCC)  Follows with PM, has injections scheduled mid July    4. Essential hypertension  At goal with amlodipine 10 mg daily and HCTZ 25 mg, Toprol XL 25 mg daily.  Renal fct reviewed and appropriate.     5. BOWERS (nonalcoholic steatohepatitis)  LFTs improving with weight loss.  Last Fibroscan 7/23 was Metavir F1     6. Severe obesity (BMI 35.0-35.9 with comorbidity) (HCC)  Started to rapidly regain weight since discontinuing ozempic.  Discussed diet/exercise and change to Mounjaro with concurrent DM2 as above.              Katelyn Ann Leopold, MD  7:38 AM  7/1/24

## 2024-07-03 ENCOUNTER — TELEPHONE (OUTPATIENT)
Dept: PHYSICAL MEDICINE AND REHAB | Age: 59
End: 2024-07-03

## 2024-07-08 ENCOUNTER — APPOINTMENT (OUTPATIENT)
Dept: GENERAL RADIOLOGY | Age: 59
End: 2024-07-08
Attending: PAIN MEDICINE
Payer: COMMERCIAL

## 2024-07-08 ENCOUNTER — HOSPITAL ENCOUNTER (OUTPATIENT)
Age: 59
Setting detail: OUTPATIENT SURGERY
Discharge: HOME | End: 2024-07-08
Attending: PAIN MEDICINE | Admitting: PAIN MEDICINE
Payer: COMMERCIAL

## 2024-07-08 VITALS
DIASTOLIC BLOOD PRESSURE: 60 MMHG | HEART RATE: 68 BPM | OXYGEN SATURATION: 98 % | BODY MASS INDEX: 37.07 KG/M2 | TEMPERATURE: 97.5 F | SYSTOLIC BLOOD PRESSURE: 117 MMHG | RESPIRATION RATE: 16 BRPM | WEIGHT: 188.8 LBS | HEIGHT: 60 IN

## 2024-07-08 PROBLEM — M16.11 PRIMARY OSTEOARTHRITIS OF RIGHT HIP: Status: ACTIVE | Noted: 2024-07-08

## 2024-07-08 PROBLEM — M70.61 TROCHANTERIC BURSITIS OF RIGHT HIP: Status: ACTIVE | Noted: 2024-07-08

## 2024-07-08 LAB — GLUCOSE BLD STRIP.AUTO-MCNC: 104 MG/DL (ref 70–108)

## 2024-07-08 PROCEDURE — 7100000010 HC PHASE II RECOVERY - FIRST 15 MIN: Performed by: PAIN MEDICINE

## 2024-07-08 PROCEDURE — 6360000002 HC RX W HCPCS: Performed by: PAIN MEDICINE

## 2024-07-08 PROCEDURE — 2709999900 HC NON-CHARGEABLE SUPPLY: Performed by: PAIN MEDICINE

## 2024-07-08 PROCEDURE — 2500000003 HC RX 250 WO HCPCS: Performed by: PAIN MEDICINE

## 2024-07-08 PROCEDURE — 3600000054 HC PAIN LEVEL 3 BASE: Performed by: PAIN MEDICINE

## 2024-07-08 PROCEDURE — 20610 DRAIN/INJ JOINT/BURSA W/O US: CPT | Performed by: PAIN MEDICINE

## 2024-07-08 PROCEDURE — 82948 REAGENT STRIP/BLOOD GLUCOSE: CPT

## 2024-07-08 RX ORDER — METHYLPREDNISOLONE ACETATE 80 MG/ML
INJECTION, SUSPENSION INTRA-ARTICULAR; INTRALESIONAL; INTRAMUSCULAR; SOFT TISSUE PRN
Status: DISCONTINUED | OUTPATIENT
Start: 2024-07-08 | End: 2024-07-08 | Stop reason: ALTCHOICE

## 2024-07-08 RX ORDER — LIDOCAINE HYDROCHLORIDE 20 MG/ML
INJECTION, SOLUTION INFILTRATION; PERINEURAL PRN
Status: DISCONTINUED | OUTPATIENT
Start: 2024-07-08 | End: 2024-07-08 | Stop reason: ALTCHOICE

## 2024-07-08 RX ORDER — BUPIVACAINE HYDROCHLORIDE 5 MG/ML
INJECTION, SOLUTION PERINEURAL PRN
Status: DISCONTINUED | OUTPATIENT
Start: 2024-07-08 | End: 2024-07-08 | Stop reason: ALTCHOICE

## 2024-07-08 ASSESSMENT — PAIN - FUNCTIONAL ASSESSMENT: PAIN_FUNCTIONAL_ASSESSMENT: NONE - DENIES PAIN

## 2024-07-08 NOTE — H&P
Cranial Nerves: Cranial nerves 2-12 are intact. No cranial nerve deficit.      Sensory: No sensory deficit.      Motor: Weakness present. No atrophy or abnormal muscle tone.      Coordination: Coordination normal.      Gait: Gait abnormal.      Deep Tendon Reflexes: Babinski sign absent on the right side.      Reflex Scores:       Patellar reflexes are 1+ on the right side and 2+ on the left side.       Achilles reflexes are 1+ on the right side and 2+ on the left side.     Comments: Motor 4/5 RLE Positive RLE SLR   Psychiatric:         Attention and Perception: Attention and perception normal. She is attentive.         Mood and Affect: Mood and affect normal. Mood is not anxious or depressed. Affect is not labile, blunt, angry or inappropriate.         Speech: Speech normal. She is communicative. Speech is not rapid and pressured, delayed, slurred or tangential.         Behavior: Behavior normal. Behavior is not agitated, slowed, aggressive, withdrawn, hyperactive or combative. Behavior is cooperative.         Thought Content: Thought content normal. Thought content is not paranoid or delusional. Thought content does not include homicidal or suicidal ideation. Thought content does not include homicidal or suicidal plan.         Cognition and Memory: Cognition and memory normal. Memory is not impaired. She does not exhibit impaired recent memory or impaired remote memory.         Judgment: Judgment normal. Judgment is not impulsive or inappropriate.         VITO  Patricks test  positive  Yeoman's or Gaenslen's  positive  Kemps  positive        Assessment  Assessment:      1. Trochanteric bursitis of right hip    2. Primary osteoarthritis of right hip    3. SI (sacroiliac) joint inflammation (HCC)    4. Sacroiliac joint dysfunction    5. Lumbosacral radiculitis    6. Lumbar spondylosis    7. Primary osteoarthritis of both shoulders                            Assessment & Plan  Plan:      Testing, Labs or Radiology

## 2024-07-08 NOTE — PROGRESS NOTES
916-Patient to Phase II via cart. Report received from Franchesca RUIZ. Patient local .Vitals obtained and stable. Respirations even and unlabored on room air. Patient denies pain, nausea, numbness and tingling. Patient able to move all extremities. No drainage noted at injection sites. Patient getting dressed at bedside.  0925-Patient meets discharge criteria.  Discharged in stable condition with responsible . All belongings given to patient. Patient ambulated to car with assistance from RN. Patient tolerated well.

## 2024-07-08 NOTE — OP NOTE
Pre-Procedure Note    Patient Name: Suze Atkins   YOB: 1965  Medical Record Number: [id number]  Date: 7/8/24       Indication:  Right hip pain    Consent: On file.    Vital Signs:   Vitals:    07/08/24 0824   BP: (!) 111/56   Pulse: 69   Resp: 16   Temp: 97.2 °F (36.2 °C)   SpO2: 98%       Past Medical History:   has a past medical history of Anxiety, Depression, Diabetes mellitus (HCC), Diverticulitis, Hypertension, Interstitial cystitis, OCD (obsessive compulsive disorder), Restless leg syndrome, Sacroiliac inflammation (HCC), and Sleep apnea.    Past Surgical History:   has a past surgical history that includes Bladder surgery (10-13-09); Abdominal adhesion surgery (10-13-09); Hysterectomy (10-13-09); Endometrial biopsy (9-21-09); Cervical disc surgery (2015); Colonoscopy; eye surgery; Foot surgery (Left, 02/23/2017); colectomy (2016); LAPAROTOMY EXPLORATORY (N/A, 11/1/2019); Pain management procedure (Right, 10/24/2023); Back Injection (Bilateral, 1/25/2024); and Nerve Block (Bilateral, 3/19/2024).    Pre-Sedation Documentation and Exam:   Vital signs have been reviewed (see flow sheet for vitals).     Sedation/ Anesthesia Plan:   LOCAL        Preoperative Diagnosis:   1.  right hip joint osteoarthritis.  2.  right greater trochantric bursitis.      Postoperative Diagnosis:   1. right hip joint osteoarthritis.  2. right greater trochantric bursitis.    Procedure Performed:  1.  right hip joint injection.  2.  right greater trochantric bursa injection under fluroscopic guidance.    Indication for the Procedure:  The patient is complaining of pain in the right hip area of longstanding duration.  Patient's pain is not responding to conservative measures and is interfering with activities of daily living.  Physical examination revealed tenderness over the joint and movements of the joint are painful.  Abran's test is positive with patient complaining of pain in the hip.  As patient is not

## 2024-07-11 RX ORDER — ESCITALOPRAM OXALATE 20 MG/1
30 TABLET ORAL DAILY
Qty: 135 TABLET | Refills: 3 | Status: SHIPPED | OUTPATIENT
Start: 2024-07-11

## 2024-07-29 RX ORDER — ASPIRIN 81 MG/1
TABLET, COATED ORAL
Qty: 90 TABLET | Refills: 0 | Status: SHIPPED | OUTPATIENT
Start: 2024-07-29

## 2024-07-29 NOTE — TELEPHONE ENCOUNTER
Requested Prescriptions     Pending Prescriptions Disp Refills    ASPIRIN LOW DOSE 81 MG EC tablet [Pharmacy Med Name: ASPIRIN EC 81 MG TABLET] 90 tablet 0     Sig: take 1 tablet by mouth once daily       Patient's last appointment was : 7/1/2024  Patient's next appointment is : 10/11/2024

## 2024-08-08 ENCOUNTER — OFFICE VISIT (OUTPATIENT)
Dept: PAIN MANAGEMENT | Age: 59
End: 2024-08-08

## 2024-08-08 VITALS
WEIGHT: 188 LBS | HEIGHT: 60 IN | DIASTOLIC BLOOD PRESSURE: 68 MMHG | SYSTOLIC BLOOD PRESSURE: 118 MMHG | BODY MASS INDEX: 36.91 KG/M2 | HEART RATE: 64 BPM

## 2024-08-08 DIAGNOSIS — M70.61 TROCHANTERIC BURSITIS, RIGHT HIP: ICD-10-CM

## 2024-08-08 DIAGNOSIS — M46.1 SI (SACROILIAC) JOINT INFLAMMATION (HCC): ICD-10-CM

## 2024-08-08 DIAGNOSIS — M53.3 SACROILIAC JOINT DYSFUNCTION: ICD-10-CM

## 2024-08-08 DIAGNOSIS — M47.816 LUMBAR SPONDYLOSIS: ICD-10-CM

## 2024-08-08 DIAGNOSIS — M19.011 PRIMARY OSTEOARTHRITIS OF BOTH SHOULDERS: ICD-10-CM

## 2024-08-08 DIAGNOSIS — M19.012 PRIMARY OSTEOARTHRITIS OF BOTH SHOULDERS: ICD-10-CM

## 2024-08-08 DIAGNOSIS — M54.17 LUMBOSACRAL RADICULITIS: ICD-10-CM

## 2024-08-08 DIAGNOSIS — M70.61 TROCHANTERIC BURSITIS OF RIGHT HIP: ICD-10-CM

## 2024-08-08 DIAGNOSIS — M16.11 PRIMARY OSTEOARTHRITIS OF RIGHT HIP: ICD-10-CM

## 2024-08-08 DIAGNOSIS — M19.012 PRIMARY OSTEOARTHRITIS OF LEFT SHOULDER: Primary | ICD-10-CM

## 2024-08-08 RX ORDER — TRIAMCINOLONE ACETONIDE 40 MG/ML
40 INJECTION, SUSPENSION INTRA-ARTICULAR; INTRAMUSCULAR ONCE
Status: COMPLETED | OUTPATIENT
Start: 2024-08-08 | End: 2024-08-08

## 2024-08-08 RX ADMIN — TRIAMCINOLONE ACETONIDE 40 MG: 40 INJECTION, SUSPENSION INTRA-ARTICULAR; INTRAMUSCULAR at 10:03

## 2024-08-08 ASSESSMENT — ENCOUNTER SYMPTOMS: BACK PAIN: 1

## 2024-08-08 NOTE — PROGRESS NOTES
discharge.         Left eye: No discharge.      Conjunctiva/sclera: Conjunctivae normal.      Pupils: Pupils are equal, round, and reactive to light.   Neck:      Thyroid: No thyromegaly.   Cardiovascular:      Rate and Rhythm: Normal rate and regular rhythm.      Heart sounds: Normal heart sounds. No murmur heard.     No friction rub. No gallop.   Pulmonary:      Effort: Pulmonary effort is normal. No respiratory distress.      Breath sounds: Normal breath sounds. No wheezing or rales.   Chest:      Chest wall: No tenderness.   Abdominal:      General: Bowel sounds are normal. There is no distension.      Palpations: Abdomen is soft.      Tenderness: There is no abdominal tenderness. There is no guarding or rebound.   Musculoskeletal:         General: Tenderness present.      Right shoulder: Tenderness, bony tenderness and crepitus present. Decreased range of motion. Decreased strength.      Left shoulder: Tenderness, bony tenderness and crepitus present. Decreased range of motion. Decreased strength.      Cervical back: Full passive range of motion without pain, normal range of motion and neck supple. Bony tenderness present. No edema, erythema or rigidity. No muscular tenderness. Normal range of motion.      Thoracic back: Tenderness and bony tenderness present.      Lumbar back: Spasms, tenderness and bony tenderness present. Decreased range of motion. Positive right straight leg raise test.        Back:       Right hip: Tenderness and bony tenderness present. Decreased range of motion. Decreased strength.      Left hip: Bony tenderness present.      Right knee: Normal.      Left knee: Normal.   Skin:     General: Skin is warm.      Coloration: Skin is not pale.      Findings: No erythema or rash.   Neurological:      Mental Status: She is alert and oriented to person, place, and time. She is not disoriented.      Cranial Nerves: Cranial nerves 2-12 are intact. No cranial nerve deficit.      Sensory: No sensory

## 2024-08-09 ENCOUNTER — TELEPHONE (OUTPATIENT)
Dept: FAMILY MEDICINE CLINIC | Age: 59
End: 2024-08-09

## 2024-08-09 NOTE — TELEPHONE ENCOUNTER
Patient left message stating she needed a refill of her mounjaro but wanted increase in medication. She would like it sent to [facility] in [place]. Left patient message to call the office back.

## 2024-08-12 ENCOUNTER — TELEPHONE (OUTPATIENT)
Dept: FAMILY MEDICINE CLINIC | Age: 59
End: 2024-08-12

## 2024-08-12 DIAGNOSIS — E11.69 TYPE 2 DIABETES MELLITUS WITH OTHER SPECIFIED COMPLICATION, WITHOUT LONG-TERM CURRENT USE OF INSULIN (HCC): Primary | ICD-10-CM

## 2024-08-16 DIAGNOSIS — E11.69 TYPE 2 DIABETES MELLITUS WITH OTHER SPECIFIED COMPLICATION, WITHOUT LONG-TERM CURRENT USE OF INSULIN (HCC): ICD-10-CM

## 2024-08-16 DIAGNOSIS — M79.89 BILATERAL SWELLING OF FEET: ICD-10-CM

## 2024-08-16 RX ORDER — TRAZODONE HYDROCHLORIDE 50 MG/1
50-100 TABLET ORAL NIGHTLY
Qty: 60 TABLET | Refills: 3 | Status: SHIPPED | OUTPATIENT
Start: 2024-08-16

## 2024-08-16 RX ORDER — HYDROCHLOROTHIAZIDE 25 MG/1
25 TABLET ORAL DAILY
Qty: 30 TABLET | Refills: 11 | Status: SHIPPED | OUTPATIENT
Start: 2024-08-16

## 2024-08-16 RX ORDER — GABAPENTIN 100 MG/1
CAPSULE ORAL
Qty: 120 CAPSULE | Refills: 5 | Status: SHIPPED | OUTPATIENT
Start: 2024-08-16 | End: 2025-02-20

## 2024-08-16 RX ORDER — OMEPRAZOLE 40 MG/1
40 CAPSULE, DELAYED RELEASE ORAL DAILY
Qty: 90 CAPSULE | Refills: 3 | Status: SHIPPED | OUTPATIENT
Start: 2024-08-16 | End: 2025-08-11

## 2024-08-16 RX ORDER — ESCITALOPRAM OXALATE 20 MG/1
30 TABLET ORAL DAILY
Qty: 135 TABLET | Refills: 3 | Status: SHIPPED | OUTPATIENT
Start: 2024-08-16

## 2024-08-16 RX ORDER — ATORVASTATIN CALCIUM 20 MG/1
20 TABLET, FILM COATED ORAL DAILY
Qty: 90 TABLET | Refills: 0 | Status: SHIPPED | OUTPATIENT
Start: 2024-08-16

## 2024-08-16 RX ORDER — MONTELUKAST SODIUM 10 MG/1
10 TABLET ORAL NIGHTLY
Qty: 90 TABLET | Refills: 3 | Status: SHIPPED | OUTPATIENT
Start: 2024-08-16

## 2024-08-16 RX ORDER — OLOPATADINE HYDROCHLORIDE 2 MG/ML
1 SOLUTION/ DROPS OPHTHALMIC DAILY
Qty: 2.5 ML | Refills: 3 | Status: SHIPPED | OUTPATIENT
Start: 2024-08-16

## 2024-08-16 RX ORDER — BUSPIRONE HYDROCHLORIDE 15 MG/1
15 TABLET ORAL 3 TIMES DAILY
Qty: 270 TABLET | Refills: 3 | Status: SHIPPED | OUTPATIENT
Start: 2024-08-16

## 2024-08-16 RX ORDER — ARIPIPRAZOLE 10 MG/1
10 TABLET ORAL DAILY
Qty: 90 TABLET | Refills: 3 | Status: SHIPPED | OUTPATIENT
Start: 2024-08-16

## 2024-08-16 RX ORDER — AMLODIPINE BESYLATE 10 MG/1
10 TABLET ORAL DAILY
Qty: 90 TABLET | Refills: 0 | Status: SHIPPED | OUTPATIENT
Start: 2024-08-16

## 2024-08-16 RX ORDER — ASPIRIN 81 MG/1
81 TABLET ORAL DAILY
Qty: 90 TABLET | Refills: 0 | Status: SHIPPED | OUTPATIENT
Start: 2024-08-16

## 2024-08-16 RX ORDER — METOPROLOL SUCCINATE 25 MG/1
25 TABLET, EXTENDED RELEASE ORAL DAILY
Qty: 30 TABLET | Refills: 11 | Status: SHIPPED | OUTPATIENT
Start: 2024-08-16

## 2024-08-16 NOTE — TELEPHONE ENCOUNTER
Patient called requesting all scripts be sent to [facility] since [facility] is closing. She called [facility] and they will not transfer the scripts to [facility] to [facility] Pt is currently needing the Trazodone but will need new scripts for all medications soon.   Last appointment this department: 7/1/2024  Next appointment this department: 10/11/2024

## 2024-08-20 ENCOUNTER — TELEPHONE (OUTPATIENT)
Dept: FAMILY MEDICINE CLINIC | Age: 59
End: 2024-08-20

## 2024-08-20 DIAGNOSIS — E11.69 TYPE 2 DIABETES MELLITUS WITH OTHER SPECIFIED COMPLICATION, WITHOUT LONG-TERM CURRENT USE OF INSULIN (HCC): ICD-10-CM

## 2024-08-20 NOTE — TELEPHONE ENCOUNTER
Patient currently on lowest dose of Mounjaro and would like increased dose sent into [facility] pharmacy.     7/1/2024  10/11/2024    Please advise.

## 2024-10-10 NOTE — PROGRESS NOTES
[facility]  Sleep Medicine Clinic initial consultation note    Suze Atkins                                                Chief complaint: Suze Atkins is a 59 y.o.oldfemale came for further evaluation regarding her sleep apnea  with referral from Dr. Leopold, Ronna Caldwell MD    She underwent a sleep study in the past at [facility].  She used to follow with Dr. Isidro Bustillos MD in the past.      [place]:    Sleep/Wake schedule:  Usual time to go to bed during the work/regular day of week: 8 P.M  Usual time to wake up during the work//regular day of week : 5:30 AM  Over the weekends her sleep schedule:  [x]phase delayed.  She sleeps sometimes up to 2 PM the very next day after going to bed around 8 PM.  She feels the same on the weekends despite sleeping long time.  She usually falls a sleep in less than: 5 minutes.  She takes trazodone 100 mg p.o. nightly to go to sleep.  Her family physician prescribed trazodone.  She takes naps: Yes.   Number of naps per week: 4 days a week.  She takes nap every day on her off days.  During each nap she spends a total of: 4 hours.  She is not using her CPAP device during napping.  The naps were reported as refreshing: NO    Sleep Hygiene:    Is the temperature and evironment in her bed room is acceptable to her: Yes.   She watches Television in her bed room: Yes.  Sometimes  She read books, study, pay bills etc in the bed: No.  Frequency She wake up during night/sleep: 4-5 times per night  Majority of nocturnal awakenings are for urination: Yes.    Difficulty in falling back to sleep after nocturnal awakenings: No    Do you drink coffee: Yes.  She drinks 4 cups of coffee in the morning per week.  She drinks coffee on her working days.  Do you drink caffeinated beverages i.e sodas: Yes.  She drinks 1 can of caffeinated soda per day.  Do you drink tea: No.      Do you drink alcoholic beverages: No.      History of recreational drug

## 2024-10-11 ENCOUNTER — TELEPHONE (OUTPATIENT)
Dept: FAMILY MEDICINE CLINIC | Age: 59
End: 2024-10-11

## 2024-10-11 NOTE — TELEPHONE ENCOUNTER
Patient called in requesting a new script sent to [facility] for increase in her Mounjaro. I informed patient that she missed an appointment today. Patient did schedule for next week but still wants to know if medication can be increased prior to her appointment next week. I advised patient Dr Leopold is out of the office for today and may not be addressed till Monday. She voiced understanding and would like a call back after Dr Leopold addresses.

## 2024-10-16 DIAGNOSIS — E11.69 TYPE 2 DIABETES MELLITUS WITH OTHER SPECIFIED COMPLICATION, WITHOUT LONG-TERM CURRENT USE OF INSULIN (HCC): ICD-10-CM

## 2024-10-18 ENCOUNTER — OFFICE VISIT (OUTPATIENT)
Dept: FAMILY MEDICINE CLINIC | Age: 59
End: 2024-10-18

## 2024-10-18 VITALS
BODY MASS INDEX: 37.69 KG/M2 | SYSTOLIC BLOOD PRESSURE: 130 MMHG | HEART RATE: 76 BPM | DIASTOLIC BLOOD PRESSURE: 78 MMHG | HEIGHT: 60 IN | OXYGEN SATURATION: 98 % | WEIGHT: 192 LBS

## 2024-10-18 DIAGNOSIS — E11.69 TYPE 2 DIABETES MELLITUS WITH OTHER SPECIFIED COMPLICATION, WITHOUT LONG-TERM CURRENT USE OF INSULIN (HCC): Primary | ICD-10-CM

## 2024-10-18 DIAGNOSIS — M79.89 BILATERAL SWELLING OF FEET: ICD-10-CM

## 2024-10-18 DIAGNOSIS — Z23 ENCOUNTER FOR IMMUNIZATION: ICD-10-CM

## 2024-10-18 LAB — HBA1C MFR BLD: 5.2 %

## 2024-10-18 RX ORDER — HYDROCHLOROTHIAZIDE 25 MG/1
25 TABLET ORAL DAILY
Qty: 90 TABLET | Refills: 3 | Status: SHIPPED | OUTPATIENT
Start: 2024-10-18

## 2024-10-18 RX ORDER — ATORVASTATIN CALCIUM 20 MG/1
20 TABLET, FILM COATED ORAL DAILY
Qty: 90 TABLET | Refills: 3 | Status: SHIPPED | OUTPATIENT
Start: 2024-10-18

## 2024-10-18 RX ORDER — GABAPENTIN 100 MG/1
CAPSULE ORAL
Qty: 360 CAPSULE | Refills: 3 | Status: SHIPPED | OUTPATIENT
Start: 2024-10-18 | End: 2025-04-16

## 2024-10-18 RX ORDER — ARIPIPRAZOLE 10 MG/1
10 TABLET ORAL DAILY
Qty: 90 TABLET | Refills: 3 | Status: SHIPPED | OUTPATIENT
Start: 2024-10-18

## 2024-10-18 RX ORDER — BUSPIRONE HYDROCHLORIDE 15 MG/1
15 TABLET ORAL 3 TIMES DAILY
Qty: 270 TABLET | Refills: 3 | Status: SHIPPED | OUTPATIENT
Start: 2024-10-18

## 2024-10-18 RX ORDER — OMEPRAZOLE 40 MG/1
40 CAPSULE, DELAYED RELEASE ORAL DAILY
Qty: 90 CAPSULE | Refills: 3 | Status: SHIPPED | OUTPATIENT
Start: 2024-10-18 | End: 2025-10-13

## 2024-10-18 RX ORDER — MONTELUKAST SODIUM 10 MG/1
10 TABLET ORAL NIGHTLY
Qty: 90 TABLET | Refills: 3 | Status: SHIPPED | OUTPATIENT
Start: 2024-10-18

## 2024-10-18 RX ORDER — METOPROLOL SUCCINATE 25 MG/1
25 TABLET, EXTENDED RELEASE ORAL DAILY
Qty: 90 TABLET | Refills: 3 | Status: SHIPPED | OUTPATIENT
Start: 2024-10-18

## 2024-10-18 RX ORDER — OLOPATADINE HYDROCHLORIDE 2 MG/ML
1 SOLUTION/ DROPS OPHTHALMIC DAILY
Qty: 2.5 ML | Refills: 3 | Status: SHIPPED | OUTPATIENT
Start: 2024-10-18

## 2024-10-18 RX ORDER — ASPIRIN 81 MG/1
81 TABLET ORAL DAILY
Qty: 90 TABLET | Refills: 3 | Status: SHIPPED | OUTPATIENT
Start: 2024-10-18

## 2024-10-18 RX ORDER — ESCITALOPRAM OXALATE 20 MG/1
30 TABLET ORAL DAILY
Qty: 135 TABLET | Refills: 3 | Status: SHIPPED | OUTPATIENT
Start: 2024-10-18

## 2024-10-18 RX ORDER — AMLODIPINE BESYLATE 10 MG/1
10 TABLET ORAL DAILY
Qty: 90 TABLET | Refills: 3 | Status: SHIPPED | OUTPATIENT
Start: 2024-10-18

## 2024-10-18 RX ORDER — TRAZODONE HYDROCHLORIDE 50 MG/1
100 TABLET, FILM COATED ORAL NIGHTLY
Qty: 180 TABLET | Refills: 3 | Status: SHIPPED | OUTPATIENT
Start: 2024-10-18

## 2024-10-18 ASSESSMENT — ENCOUNTER SYMPTOMS
TROUBLE SWALLOWING: 0
SHORTNESS OF BREATH: 0
VOMITING: 0
CONSTIPATION: 0
SORE THROAT: 0
DIARRHEA: 0
ABDOMINAL PAIN: 0
COUGH: 0

## 2024-10-18 NOTE — PROGRESS NOTES
Immunization(s) given during visit:    Immunizations Administered       Name Date Dose Route    Influenza, FLUCELVAX, (age 6 mo+) IM, Trivalent PF, 0.5mL 10/18/2024 0.5 mL Intramuscular    Site: Deltoid- Left    Lot: [id number]    NDC: 06431-732-43            Most recent Vaccine Information Sheet given to pt, questions answered. Pt tolerated vaccine well.     
COVID-19 booster at the pharmacy.    4. Restless leg syndrome.  She will continue taking gabapentin.    5. Mood disorder.  She will continue taking Abilify 10 mg.    Follow-up  She will follow up in 6 weeks.    Attestation    The patient (or guardian, if applicable) and other individuals in attendance with the patient were advised that Artificial Intelligence will be utilized during this visit to record, process the conversation to generate a clinical note, and support improvement of the AI technology. The patient (or guardian, if applicable) and other individuals in attendance at the appointment consented to the use of AI, including the recording.                   Katelyn Ann Leopold, MD  10:50 AM  10/18/24

## 2024-11-07 NOTE — PROGRESS NOTES
Chief Complaint:  Sleep Consult for ARTEMIO    Mallampati airway Class:III  Neck Circumference:16.0 Inches    [place] sleepiness score 11/7/24: 13.  SAQLI:  56    Diagnostic Data:  PSG 9/26/17 and Titration Study 10/24/17 at [facility]  PAP Download:   Recorded compliance dates:  Hasn't been able to use PAP d/t needing supplies

## 2024-11-08 ENCOUNTER — OFFICE VISIT (OUTPATIENT)
Dept: PULMONOLOGY | Age: 59
End: 2024-11-08
Payer: COMMERCIAL

## 2024-11-08 VITALS
HEART RATE: 73 BPM | SYSTOLIC BLOOD PRESSURE: 120 MMHG | TEMPERATURE: 98.3 F | OXYGEN SATURATION: 98 % | DIASTOLIC BLOOD PRESSURE: 72 MMHG | BODY MASS INDEX: 38.13 KG/M2 | HEIGHT: 60 IN | WEIGHT: 194.2 LBS

## 2024-11-08 DIAGNOSIS — G47.10 HYPERSOMNIA: ICD-10-CM

## 2024-11-08 DIAGNOSIS — F32.A DEPRESSION, UNSPECIFIED DEPRESSION TYPE: ICD-10-CM

## 2024-11-08 DIAGNOSIS — G47.33 OSA (OBSTRUCTIVE SLEEP APNEA): Primary | ICD-10-CM

## 2024-11-08 DIAGNOSIS — I10 ESSENTIAL HYPERTENSION: ICD-10-CM

## 2024-11-08 PROCEDURE — G8427 DOCREV CUR MEDS BY ELIG CLIN: HCPCS | Performed by: INTERNAL MEDICINE

## 2024-11-08 PROCEDURE — G8417 CALC BMI ABV UP PARAM F/U: HCPCS | Performed by: INTERNAL MEDICINE

## 2024-11-08 PROCEDURE — 99204 OFFICE O/P NEW MOD 45 MIN: CPT | Performed by: INTERNAL MEDICINE

## 2024-11-08 PROCEDURE — 1036F TOBACCO NON-USER: CPT | Performed by: INTERNAL MEDICINE

## 2024-11-08 PROCEDURE — 3078F DIAST BP <80 MM HG: CPT | Performed by: INTERNAL MEDICINE

## 2024-11-08 PROCEDURE — 3017F COLORECTAL CA SCREEN DOC REV: CPT | Performed by: INTERNAL MEDICINE

## 2024-11-08 PROCEDURE — G8484 FLU IMMUNIZE NO ADMIN: HCPCS | Performed by: INTERNAL MEDICINE

## 2024-11-08 PROCEDURE — 3074F SYST BP LT 130 MM HG: CPT | Performed by: INTERNAL MEDICINE

## 2024-11-08 NOTE — PATIENT INSTRUCTIONS
Recommendations/Plan:  -She was advised to restart using her positive airway pressure therapy with above described pressure I.e 9 cm of water.  -At the request of patient, she was given prescription for CPAP supplies to submit to DME company.  -She advised to keep good compliance with current recommended pressure to get optimal results and clinical improvement.  -She was advised to loose weight by controlling diet and doing exercise.  -She was advised to use over the counter mouth guard for her bruxism after checking with her dentist. She verbalizes understanding.  -She was instructed to not to drive any motor vehicles or operate heavy equipment if she feels sleepy.   -Follow with my clinic in 2 to 3 months for clinical reevaluation with review of download.  -She was advised to call [employer] regarding supplies if needed.  -She was advised to practice good sleep hygiene techniques. She was provided with a hand out.  -She was advised call my office for earlier appointment if needed for worsening of sleep symptoms.  -Suze Atkins educated about my impression and plan. Patient verbalizes understanding.

## 2024-11-14 ENCOUNTER — OFFICE VISIT (OUTPATIENT)
Dept: PAIN MANAGEMENT | Age: 59
End: 2024-11-14

## 2024-11-14 VITALS
BODY MASS INDEX: 38.09 KG/M2 | SYSTOLIC BLOOD PRESSURE: 116 MMHG | WEIGHT: 194 LBS | DIASTOLIC BLOOD PRESSURE: 60 MMHG | HEIGHT: 60 IN

## 2024-11-14 DIAGNOSIS — M19.012 PRIMARY OSTEOARTHRITIS OF LEFT SHOULDER: Primary | ICD-10-CM

## 2024-11-14 DIAGNOSIS — M70.61 TROCHANTERIC BURSITIS OF RIGHT HIP: ICD-10-CM

## 2024-11-14 DIAGNOSIS — M16.11 PRIMARY OSTEOARTHRITIS OF RIGHT HIP: ICD-10-CM

## 2024-11-14 DIAGNOSIS — M47.816 LUMBAR SPONDYLOSIS: ICD-10-CM

## 2024-11-14 DIAGNOSIS — M19.012 PRIMARY OSTEOARTHRITIS OF BOTH SHOULDERS: ICD-10-CM

## 2024-11-14 DIAGNOSIS — M46.1 SI (SACROILIAC) JOINT INFLAMMATION (HCC): ICD-10-CM

## 2024-11-14 DIAGNOSIS — M19.011 PRIMARY OSTEOARTHRITIS OF BOTH SHOULDERS: ICD-10-CM

## 2024-11-14 DIAGNOSIS — M53.3 SACROILIAC JOINT DYSFUNCTION: ICD-10-CM

## 2024-11-14 DIAGNOSIS — M54.17 LUMBOSACRAL RADICULITIS: ICD-10-CM

## 2024-11-14 RX ORDER — TRIAMCINOLONE ACETONIDE 40 MG/ML
40 INJECTION, SUSPENSION INTRA-ARTICULAR; INTRAMUSCULAR ONCE
Status: COMPLETED | OUTPATIENT
Start: 2024-11-14 | End: 2024-11-14

## 2024-11-14 RX ADMIN — TRIAMCINOLONE ACETONIDE 40 MG: 40 INJECTION, SUSPENSION INTRA-ARTICULAR; INTRAMUSCULAR at 09:30

## 2024-11-14 ASSESSMENT — ENCOUNTER SYMPTOMS: BACK PAIN: 1

## 2024-11-14 NOTE — PROGRESS NOTES
Functionality Assessment/Goals Worksheet     On a scale of 0 (Does not Interfere) to 10 (Completely Interferes)     1.  Which number describes how during the past week pain has interfered with           the following:  A.  General Activity:  7  B.  Mood: 6  C.  Walking Ability:  7  D.  Normal Work (Includes both work outside the home and housework):  7  E.  Relations with Other People:   6  F.  Sleep:   5  G.  Enjoyment of Life:   5    2.  Patient Prefers to Take their Pain Medications:     []  On a regular basis   []  Only when necessary    [x]  Does not take pain medications    3.  What are the Patient's Goals/Expectations for Visiting Pain Management?     [x]  Learn about my pain    []  Receive Medication   []  Physical Therapy     []  Treat Depression   []  Receive Injections    []  Treat Sleep   []  Deal with Anxiety and Stress   []  Treat Opoid Dependence/Addiction   []  Other:                                
at 9:20 AM

## 2024-11-14 NOTE — PATIENT INSTRUCTIONS
Patient Education        Learning About a Hip Bursa Injection  What is a hip bursa injection?     A bursa is a small sac of fluid that cushions an area between tendons and bones. The bursa on the outer side of the hip bone is called the trochanteric (say \"GRNM-eai-ENFV-ik\") bursa. Sometimes it can become swollen and painful. This condition is called bursitis.  An injection into the bursa is a shot of medicine to reduce pain and swelling. The medicines may include pain relievers and steroid medicines. A steroid shot can sometimes help with short-term pain relief when other treatments haven't worked.  How is a hip bursa injection done?  First the area over the bursa will be cleaned. Your doctor may use a tiny needle to numb the skin over the area where you will get the injection.  If a tiny needle is used to numb the area, your doctor will use another needle to inject the medicine. Your doctor may use a pain reliever, a steroid, or both. You may feel some pressure or discomfort.  Your doctor may put ice on the area before you go home.  What can you expect after the injection?  You will probably go home soon after your shot. You may have numbness over your hip for a few hours.  If your shot included both a pain reliever and a steroid, the pain will probably go away right away. But it might come back after a few hours. This might happen if the pain reliever wears off and the steroid has not started to work yet. The steroid medicine generally takes a few days to work.  If the pain comes back, you can put ice or a cold pack on your hip for 10 to 20 minutes at a time. Put a thin cloth between the ice and your skin.  Follow your doctor's instructions carefully. Avoid strenuous activities on the day you get the shot, especially those that put stress on your hip.  Steroids don't always work. But when they do, the pain relief can last for several days to a few months or longer.  Follow-up care is a key part of your treatment

## 2024-12-10 ENCOUNTER — OFFICE VISIT (OUTPATIENT)
Dept: FAMILY MEDICINE CLINIC | Age: 59
End: 2024-12-10
Payer: COMMERCIAL

## 2024-12-10 ENCOUNTER — TELEPHONE (OUTPATIENT)
Dept: FAMILY MEDICINE CLINIC | Age: 59
End: 2024-12-10

## 2024-12-10 ENCOUNTER — HOSPITAL ENCOUNTER (OUTPATIENT)
Dept: GENERAL RADIOLOGY | Age: 59
Discharge: HOME | End: 2024-12-10
Payer: COMMERCIAL

## 2024-12-10 ENCOUNTER — HOSPITAL ENCOUNTER (OUTPATIENT)
Dept: ULTRASOUND IMAGING | Age: 59
Discharge: HOME | End: 2024-12-10
Payer: COMMERCIAL

## 2024-12-10 VITALS
HEIGHT: 60 IN | OXYGEN SATURATION: 98 % | BODY MASS INDEX: 38.09 KG/M2 | DIASTOLIC BLOOD PRESSURE: 70 MMHG | HEART RATE: 76 BPM | WEIGHT: 194 LBS | SYSTOLIC BLOOD PRESSURE: 118 MMHG

## 2024-12-10 DIAGNOSIS — M79.661 RIGHT CALF PAIN: ICD-10-CM

## 2024-12-10 DIAGNOSIS — M25.561 ACUTE PAIN OF RIGHT KNEE: Primary | ICD-10-CM

## 2024-12-10 DIAGNOSIS — M25.561 ACUTE PAIN OF RIGHT KNEE: ICD-10-CM

## 2024-12-10 LAB — ECHO BSA: 1.93 M2

## 2024-12-10 PROCEDURE — G8417 CALC BMI ABV UP PARAM F/U: HCPCS | Performed by: FAMILY MEDICINE

## 2024-12-10 PROCEDURE — 3078F DIAST BP <80 MM HG: CPT | Performed by: FAMILY MEDICINE

## 2024-12-10 PROCEDURE — G8484 FLU IMMUNIZE NO ADMIN: HCPCS | Performed by: FAMILY MEDICINE

## 2024-12-10 PROCEDURE — 73562 X-RAY EXAM OF KNEE 3: CPT

## 2024-12-10 PROCEDURE — 93971 EXTREMITY STUDY: CPT

## 2024-12-10 PROCEDURE — 1036F TOBACCO NON-USER: CPT | Performed by: FAMILY MEDICINE

## 2024-12-10 PROCEDURE — 3074F SYST BP LT 130 MM HG: CPT | Performed by: FAMILY MEDICINE

## 2024-12-10 PROCEDURE — 3017F COLORECTAL CA SCREEN DOC REV: CPT | Performed by: FAMILY MEDICINE

## 2024-12-10 PROCEDURE — 99214 OFFICE O/P EST MOD 30 MIN: CPT | Performed by: FAMILY MEDICINE

## 2024-12-10 PROCEDURE — G8427 DOCREV CUR MEDS BY ELIG CLIN: HCPCS | Performed by: FAMILY MEDICINE

## 2024-12-10 RX ORDER — MELOXICAM 15 MG/1
15 TABLET ORAL DAILY PRN
Qty: 30 TABLET | Refills: 0 | Status: SHIPPED | OUTPATIENT
Start: 2024-12-10

## 2024-12-10 NOTE — RESULT ENCOUNTER NOTE
Please let Nader know her US did not show clot but she does have a bakers cyst behind the R knee which can cause pain and be related to arthritis.  Will review results of Xray but she would benefit from trial of meloxicam 15 mg once daily for the next few weeks and if not improving we can get her set up with orthopedics

## 2024-12-10 NOTE — PROGRESS NOTES
applicable) and other individuals in attendance with the patient were advised that Artificial Intelligence will be utilized during this visit to record, process the conversation to generate a clinical note, and support improvement of the AI technology. The patient (or guardian, if applicable) and other individuals in attendance at the appointment consented to the use of AI, including the recording.                     Electronically signed by Katelyn Ann Leopold, MD on 12/11/2024 at 2:02 PM

## 2024-12-13 ENCOUNTER — TELEPHONE (OUTPATIENT)
Dept: PULMONOLOGY | Age: 59
End: 2024-12-13

## 2024-12-13 DIAGNOSIS — F32.A DEPRESSION, UNSPECIFIED DEPRESSION TYPE: ICD-10-CM

## 2024-12-13 DIAGNOSIS — G47.33 OSA (OBSTRUCTIVE SLEEP APNEA): Primary | ICD-10-CM

## 2024-12-13 DIAGNOSIS — G47.10 HYPERSOMNIA: ICD-10-CM

## 2024-12-13 DIAGNOSIS — I10 ESSENTIAL HYPERTENSION: ICD-10-CM

## 2024-12-13 NOTE — TELEPHONE ENCOUNTER
Tequila from [facility] called regarding Nader. Since she has not been using her machine for a couple of years they are needing a new sleep study before they can supply her with anything for her machine. Please advise. Thank you

## 2024-12-16 NOTE — TELEPHONE ENCOUNTER
Called to [facility]. I scheduled split night for 1/9/25. Verified address to mail sleep instructions, time and date. Thank you

## 2024-12-20 ENCOUNTER — OFFICE VISIT (OUTPATIENT)
Dept: FAMILY MEDICINE CLINIC | Age: 59
End: 2024-12-20
Payer: COMMERCIAL

## 2024-12-20 VITALS
BODY MASS INDEX: 37.3 KG/M2 | DIASTOLIC BLOOD PRESSURE: 72 MMHG | OXYGEN SATURATION: 99 % | HEART RATE: 78 BPM | SYSTOLIC BLOOD PRESSURE: 110 MMHG | HEIGHT: 60 IN | WEIGHT: 190 LBS

## 2024-12-20 DIAGNOSIS — E11.69 TYPE 2 DIABETES MELLITUS WITH OTHER SPECIFIED COMPLICATION, WITHOUT LONG-TERM CURRENT USE OF INSULIN (HCC): ICD-10-CM

## 2024-12-20 DIAGNOSIS — M71.21 BAKER'S CYST OF KNEE, RIGHT: ICD-10-CM

## 2024-12-20 DIAGNOSIS — M17.11 PRIMARY OSTEOARTHRITIS OF RIGHT KNEE: Primary | ICD-10-CM

## 2024-12-20 PROCEDURE — G8417 CALC BMI ABV UP PARAM F/U: HCPCS | Performed by: FAMILY MEDICINE

## 2024-12-20 PROCEDURE — 3074F SYST BP LT 130 MM HG: CPT | Performed by: FAMILY MEDICINE

## 2024-12-20 PROCEDURE — G8484 FLU IMMUNIZE NO ADMIN: HCPCS | Performed by: FAMILY MEDICINE

## 2024-12-20 PROCEDURE — 1036F TOBACCO NON-USER: CPT | Performed by: FAMILY MEDICINE

## 2024-12-20 PROCEDURE — 99214 OFFICE O/P EST MOD 30 MIN: CPT | Performed by: FAMILY MEDICINE

## 2024-12-20 PROCEDURE — 3044F HG A1C LEVEL LT 7.0%: CPT | Performed by: FAMILY MEDICINE

## 2024-12-20 PROCEDURE — 3078F DIAST BP <80 MM HG: CPT | Performed by: FAMILY MEDICINE

## 2024-12-20 PROCEDURE — 3017F COLORECTAL CA SCREEN DOC REV: CPT | Performed by: FAMILY MEDICINE

## 2024-12-20 PROCEDURE — G8427 DOCREV CUR MEDS BY ELIG CLIN: HCPCS | Performed by: FAMILY MEDICINE

## 2024-12-20 PROCEDURE — 2022F DILAT RTA XM EVC RTNOPTHY: CPT | Performed by: FAMILY MEDICINE

## 2024-12-20 RX ORDER — TIRZEPATIDE 10 MG/.5ML
10 INJECTION, SOLUTION SUBCUTANEOUS WEEKLY
Qty: 2 ML | Refills: 3 | Status: SHIPPED | OUTPATIENT
Start: 2024-12-20

## 2024-12-20 NOTE — PROGRESS NOTES
[facility] [place] [facility]  [address]  [place] [address]  Dept: [phone]     SUBJECTIVE     Suze Atkins is a 59 y.o.female  History of Present Illness  The patient presents for evaluation of R knee pain.    She reports persistent knee pain, which she has been managing with Mobic 15 mg once daily. The medication provides minimal relief. She experiences difficulty in bending her knee and has been limping due to the pain. She describes a sensation of stiffness in the knee when it is at rest, which slightly improves upon movement. However, she is unable to ascend or descend stairs. She has not sought orthopedic consultation previously. She expresses a desire to increase her Mobic dosage. Her mood remains generally stable, although she expresses dissatisfaction with her knee condition.  Doppler at last visit neg for DVT- did indicate R posterior knee bakers cyst.  XR same visit indicates OA medial compartment.     Supplemental Information  She is scheduled for a sleep study on 01/09/2025.    MEDICATIONS  Current: Mobic, Tylenol arthritis    Patient Active Problem List   Diagnosis    Anxiety    Depression    OCD (obsessive compulsive disorder)    Essential hypertension    Other constipation    Adnexal mass    S/P exploratory laparotomy    Peripheral polyneuropathy    Lumbar radiculopathy    BOWERS (nonalcoholic steatohepatitis)    Diabetes mellitus (HCC)    Spinal stenosis of lumbar region without neurogenic claudication    Sacroiliac inflammation (HCC)    Severe obesity (BMI 35.0-35.9 with comorbidity)    Restless legs syndrome (RLS)    Primary osteoarthritis of right hip    Trochanteric bursitis of right hip       Current Outpatient Medications   Medication Sig Dispense Refill    Tirzepatide (MOUNJARO) 10 MG/0.5ML SOAJ Inject 10 mg into the skin once a week 2 mL 3    meloxicam (MOBIC) 15 MG tablet Take 1 tablet by mouth daily as needed for Pain 30 tablet 0    amLODIPine

## 2024-12-27 ENCOUNTER — TELEPHONE (OUTPATIENT)
Dept: SLEEP CENTER | Age: 59
End: 2024-12-27

## 2024-12-27 DIAGNOSIS — G47.10 HYPERSOMNIA: ICD-10-CM

## 2024-12-27 DIAGNOSIS — G47.33 OSA (OBSTRUCTIVE SLEEP APNEA): Primary | ICD-10-CM

## 2024-12-27 NOTE — TELEPHONE ENCOUNTER
Spoke with pt and informed her that Dr. Washington placed an order to be faxed to DME and that her sleep study that was originally scheduled for 01/09/25 has been cx.  She voiced understanding.  Faxed CPAP order to [facility].      Adriana

## 2024-12-27 NOTE — TELEPHONE ENCOUNTER
Dr. Washington, please advise.    Thank you,  Adriana          Case below has denied: 2024     Patient name: Suze Atkins      : 1965     DOS: 2025     CPT: [id number]     Insurance: [employer] - CHOICE PLUS     Reason for denial: Not Medically Necessary     Case/Tracking/Auth/Ref: [id number]       Date of Denial: 2024     P2P Phone#: [phone]     Please find the attached denial letter.     Thanks, & Regards   [place]

## 2025-01-06 ENCOUNTER — TELEPHONE (OUTPATIENT)
Dept: PHYSICAL MEDICINE AND REHAB | Age: 60
End: 2025-01-06

## 2025-01-10 ENCOUNTER — HOSPITAL ENCOUNTER (OUTPATIENT)
Dept: PHYSICAL THERAPY | Age: 60
Setting detail: THERAPIES SERIES
Discharge: HOME | End: 2025-01-10
Payer: COMMERCIAL

## 2025-01-10 PROCEDURE — 97035 APP MDLTY 1+ULTRASOUND EA 15: CPT

## 2025-01-10 PROCEDURE — 97161 PT EVAL LOW COMPLEX 20 MIN: CPT

## 2025-01-10 NOTE — PROGRESS NOTES
Will continue education.  []  Barriers to learning:     PLAN:  Treatment Recommendations: Strengthening, Range of Motion, Balance Training, Functional Mobility Training, Transfer Training, Endurance Training, Gait Training, Stair Training, Neuromuscular Re-education, Manual Therapy - Soft Tissue Mobilization, Pain Management, Home Exercise Program, Patient Education, Aquatics, and Modalities    [x]  Plan of care initiated.  Plan to see patient 2 times per week for 8 weeks to address the treatment planned outlined above.  []  Continue with current plan of care  []  Modify plan of care as follows:    []  Hold pending physician visit  []  Discharge    Time In 0935   Time Out 1030   Timed Code Minutes: 10 min   Total Treatment Time: 55 min       Electronically Signed by: [place] [id number]

## 2025-01-16 NOTE — PROGRESS NOTES
[place] [facility]  Sleep Medicine Clinic Follow up note    Suze Atkins                                             Chief complaint and [place]: Suze Atkins is a 59 y.o.oldfemale came for follow up regarding her sleep apnea. She is currently using her positive airway pressure device with an Auto CPAP with a minimum CPAP pressure of with a minimum CPAP pressure of 5cm H20 and maximum CPAP pressure of 12cm H20. She denies any problems with machine or mask.  She is currently using a nasal mask.  She is sleeping well at night with out difficulty.She denies any daytime sleepiness.     She is currently working at [employer] in [employer] [facility] during daytime.  She is still working as a full-time worker    She denied any prior history of COPD, CHF, Obesity hypoventilation, Prior palate surgery and Central sleep apnea.      Review of Systems:   General/Constitutional: she lost 4 lbs of weight from the last visit with normal appetite. No fever or chills.  She lost 19 pounds of weight from her initial sleep study performed in 2017.  HENT: Negative.   Eyes: Negative.  Upper respiratory tract: No nasal stuffiness or post nasal drip.  Lower respiratory tract/ lungs: No cough or sputum production. No hemoptysis.  Cardiovascular: No palpitations or chest pain.  Gastrointestinal: No nausea or vomiting.  Neurological: No focal neurologiacal weakness.  Extremities: No edema.  Musculoskeletal: No complaints.  Genitourinary: No complaints.  Hematological: Negative.   Psychiatric/Behavioral: Negative.   Skin: No itching.        Past Medical History:   Diagnosis Date    Anxiety     Depression     Not taking any medications    Diabetes mellitus (HCC) 08/04/2023    Diverticulitis     Hypertension     Interstitial cystitis     Knee pain, right 01/16/2025    at [facility] - cyst behind the knee and a torn meniscus    OCD (obsessive compulsive disorder)     Restless leg syndrome     Takes

## 2025-01-17 ENCOUNTER — HOSPITAL ENCOUNTER (OUTPATIENT)
Dept: PHYSICAL THERAPY | Age: 60
Setting detail: THERAPIES SERIES
Discharge: HOME | End: 2025-01-17
Payer: COMMERCIAL

## 2025-01-17 ENCOUNTER — TELEPHONE (OUTPATIENT)
Dept: FAMILY MEDICINE CLINIC | Age: 60
End: 2025-01-17

## 2025-01-17 PROCEDURE — 97110 THERAPEUTIC EXERCISES: CPT

## 2025-01-17 NOTE — DISCHARGE SUMMARY
[facility]  PHYSICAL THERAPY OUTPATIENT QUICK DISCHARGE NOTE  [facility]    Date: 2025  Patient Name:  Suze Atkins  : 1965  MRN: [id number]  CSN: [id number]    Referring Practitioner Leopold, Katelyn Ann, MD [id number]      Diagnosis Unilateral primary osteoarthritis, right knee  Synovial cyst of popliteal space (Baker), right knee     Patient is discharged from Physical Therapy services at this time.  See last note for details related to results of therapy and goal achievement.    Reason for discharge:  Patient's ordering physician called and stated could discharge patient at this time since ortho doctor did not really order any therapy. PT spoke with patient to let her know being discharged  [place] [id number]

## 2025-01-17 NOTE — PROGRESS NOTES
[facility]  PHYSICAL THERAPY  [] EVALUATION  [x] DAILY NOTE (LAND) [] DAILY NOTE (AQUATIC ) [] PROGRESS NOTE [] DISCHARGE NOTE    [] OUTPATIENT REHABILITATION CENTER [facility]   [] [facility]    [x] [facility]   [] [facility]    Date: 2025  Patient Name:  Suze Atkins  : 1965  MRN: [id number]  CSN: [id number]    Referring Practitioner Leopold, Katelyn Ann, MD [id number]      Diagnosis  Diagnoses       M17.11 (ICD-10-CM) - Unilateral primary osteoarthritis, right knee    M71.21 (ICD-10-CM) - Synovial cyst of popliteal space (Fitzpatrick), right knee           Treatment Diagnosis M25.561  Right Knee Pain  R53.1 Weakness  R26.2 Difficulty in walking   Date of Evaluation 1/10/25   Additional Pertinent History Suze Atkins has a past medical history of Anxiety, Depression, Diabetes mellitus (HCC), Diverticulitis, Hypertension, Interstitial cystitis, OCD (obsessive compulsive disorder), Restless leg syndrome, Sacroiliac inflammation (HCC), and Sleep apnea.  she has a past surgical history that includes Bladder surgery (10-13-09); Abdominal adhesion surgery (10-13-09); Hysterectomy (10-13-09); Endometrial biopsy (09); Cervical disc surgery (); Colonoscopy; eye surgery; Foot surgery (Left, 2017); colectomy (); LAPAROTOMY EXPLORATORY (N/A, 2019); Pain management procedure (Right, 10/24/2023); Back Injection (Bilateral, 2024); Nerve Block (Bilateral, 3/19/2024); and Knee Arthrocentesis (Right, 2024).     Allergies Allergies   Allergen Reactions    Hydrocodone-Acetaminophen Nausea And Vomiting    Tricyclic Antidepressants Other (See Comments)      Medications   Current Outpatient Medications:     Tirzepatide (MOUNJARO) 10 MG/0.5ML SOAJ, Inject 10 mg into the skin once a week, Disp: 2 mL, Rfl: 3    meloxicam (MOBIC) 15 MG tablet, Take 1 tablet by mouth daily as needed for Pain, Disp: 30 tablet, Rfl: 0    amLODIPine

## 2025-01-17 NOTE — TELEPHONE ENCOUNTER
Franchesca from [facility] therapy dept called and would like to know if patient  she be discharged stating she was  Ordered to come to PT for knee was seen at ortho yesterday  and had injection and they advised at ortho if injection is not helpful they are expecting surgery for knee replacement . Franchesca ward call back is [phone]

## 2025-02-13 NOTE — PROGRESS NOTES
Chief Complaint:  3mo ARTEMIO f/u w/download    Mallampati airway Class:IV  Neck Circumference:16.0 Inches    [place] sleepiness score 2/13/25: 1.  SAQLI:  98    Diagnostic Data: Had been scheduled for Split Night but insurance denied  PAP Download:   Recorded compliance dates: 1/4/25-2/2/25  More than 4hour usage compliance was:87%.  Average residual Apnea- Hypoapnea index on current pressue was:2.3.      PAP Type APAP   Level  5/12 cmH2O     Average usuage hours per day was:8.39     Interface: Nasal    Provider:  [][facility]  [][place]  []Simran  []Lesley         [][facility] [x]  [facility]

## 2025-02-14 ENCOUNTER — OFFICE VISIT (OUTPATIENT)
Dept: PULMONOLOGY | Age: 60
End: 2025-02-14
Payer: COMMERCIAL

## 2025-02-14 VITALS
OXYGEN SATURATION: 100 % | WEIGHT: 191.8 LBS | HEIGHT: 60 IN | DIASTOLIC BLOOD PRESSURE: 92 MMHG | SYSTOLIC BLOOD PRESSURE: 128 MMHG | BODY MASS INDEX: 37.66 KG/M2 | HEART RATE: 81 BPM | TEMPERATURE: 98.5 F

## 2025-02-14 DIAGNOSIS — G47.10 HYPERSOMNIA: ICD-10-CM

## 2025-02-14 DIAGNOSIS — G47.33 OSA (OBSTRUCTIVE SLEEP APNEA): Primary | ICD-10-CM

## 2025-02-14 PROCEDURE — 3074F SYST BP LT 130 MM HG: CPT | Performed by: INTERNAL MEDICINE

## 2025-02-14 PROCEDURE — 3078F DIAST BP <80 MM HG: CPT | Performed by: INTERNAL MEDICINE

## 2025-02-14 PROCEDURE — 99214 OFFICE O/P EST MOD 30 MIN: CPT | Performed by: INTERNAL MEDICINE

## 2025-02-14 PROCEDURE — G8417 CALC BMI ABV UP PARAM F/U: HCPCS | Performed by: INTERNAL MEDICINE

## 2025-02-14 PROCEDURE — 1036F TOBACCO NON-USER: CPT | Performed by: INTERNAL MEDICINE

## 2025-02-14 PROCEDURE — G8427 DOCREV CUR MEDS BY ELIG CLIN: HCPCS | Performed by: INTERNAL MEDICINE

## 2025-02-14 PROCEDURE — 3017F COLORECTAL CA SCREEN DOC REV: CPT | Performed by: INTERNAL MEDICINE

## 2025-02-14 NOTE — PATIENT INSTRUCTIONS
Recommendations/Plan:  -She would like to continue her current auto CPAP therapy and refused to go for change to a fixed CPAP pressure setting.  -She was advised to make early appointment with my clinic if she develops any of the following conditions including -> COPD, CHF,Obesity hypoventilation syndrome, undergo palate surgery and Central sleep apnea her Auto CPAP settings to a fixed CPAP pressure settings. She verbalizes understanding.  -At the request of patient, she was given prescription for CPAP supplies to submit to DME company.  -She advised to keep good compliance with current recommended pressure to get optimal results and clinical improvement.  -Follow with my clinic in 12months for clinical reevaluation with review of download.  -She was advised to call [facility] regarding supplies if needed.  -She was advised to continue to practice good sleep hygiene practices.  -She was advised to loose weight by controlling diet and doing exercise.  -She was advised call my office for earlier appointment if needed for worsening of sleep symptoms.  -Suze Atkins educated about my impression and plan. Patient verbalizes understanding.

## 2025-03-07 ENCOUNTER — OFFICE VISIT (OUTPATIENT)
Dept: FAMILY MEDICINE CLINIC | Age: 60
End: 2025-03-07
Payer: COMMERCIAL

## 2025-03-07 VITALS
BODY MASS INDEX: 37.55 KG/M2 | WEIGHT: 191.25 LBS | DIASTOLIC BLOOD PRESSURE: 64 MMHG | OXYGEN SATURATION: 98 % | HEIGHT: 60 IN | RESPIRATION RATE: 16 BRPM | HEART RATE: 85 BPM | TEMPERATURE: 98.2 F | SYSTOLIC BLOOD PRESSURE: 108 MMHG

## 2025-03-07 DIAGNOSIS — R23.3 PETECHIAL RASH: Primary | ICD-10-CM

## 2025-03-07 LAB
ALBUMIN SERPL BCG-MCNC: 3.9 G/DL (ref 3.4–4.9)
ALP SERPL-CCNC: 159 U/L (ref 35–104)
ALT SERPL W/O P-5'-P-CCNC: 50 U/L (ref 10–35)
ANION GAP SERPL CALC-SCNC: 11 MEQ/L (ref 8–16)
AST SERPL-CCNC: 56 U/L (ref 10–35)
BASOPHILS ABSOLUTE: 0.1 THOU/MM3 (ref 0–0.1)
BASOPHILS NFR BLD AUTO: 1.3 %
BILIRUB SERPL-MCNC: 0.9 MG/DL (ref 0.3–1.2)
BUN SERPL-MCNC: 14 MG/DL (ref 8–23)
CALCIUM SERPL-MCNC: 9.2 MG/DL (ref 8.8–10.2)
CHLORIDE SERPL-SCNC: 100 MEQ/L (ref 98–111)
CO2 SERPL-SCNC: 31 MEQ/L (ref 22–29)
CREAT SERPL-MCNC: 0.8 MG/DL (ref 0.5–0.9)
CRP SERPL-MCNC: 0.34 MG/DL (ref 0–0.5)
DEPRECATED RDW RBC AUTO: 45.9 FL (ref 35–45)
EOSINOPHIL NFR BLD AUTO: 1.3 %
EOSINOPHILS ABSOLUTE: 0.1 THOU/MM3 (ref 0–0.4)
ERYTHROCYTE [DISTWIDTH] IN BLOOD BY AUTOMATED COUNT: 13.9 % (ref 11.5–14.5)
FERRITIN SERPL IA-MCNC: 108 NG/ML (ref 13–150)
GFR SERPL CREATININE-BSD FRML MDRD: 83 ML/MIN/1.73M2
GLUCOSE SERPL-MCNC: 131 MG/DL (ref 74–109)
HCT VFR BLD AUTO: 40.1 % (ref 37–47)
HGB BLD-MCNC: 14.5 GM/DL (ref 12–16)
IMM GRANULOCYTES # BLD AUTO: 0.02 THOU/MM3 (ref 0–0.07)
IMM GRANULOCYTES NFR BLD AUTO: 0.3 %
LYMPHOCYTES ABSOLUTE: 1.5 THOU/MM3 (ref 1–4.8)
LYMPHOCYTES NFR BLD AUTO: 25 %
MCH RBC QN AUTO: 32.9 PG (ref 26–33)
MCHC RBC AUTO-ENTMCNC: 36.2 GM/DL (ref 32.2–35.5)
MCV RBC AUTO: 90.9 FL (ref 81–99)
MONOCYTES ABSOLUTE: 0.4 THOU/MM3 (ref 0.4–1.3)
MONOCYTES NFR BLD AUTO: 7.2 %
NEUTROPHILS ABSOLUTE: 4 THOU/MM3 (ref 1.8–7.7)
NEUTROPHILS NFR BLD AUTO: 64.9 %
NRBC BLD AUTO-RTO: 0 /100 WBC
PLATELET # BLD AUTO: 162 THOU/MM3 (ref 130–400)
PMV BLD AUTO: 10.4 FL (ref 9.4–12.4)
POTASSIUM SERPL-SCNC: 3.2 MEQ/L (ref 3.5–5.2)
PROT SERPL-MCNC: 7 G/DL (ref 6.4–8.3)
RBC # BLD AUTO: 4.41 MILL/MM3 (ref 4.2–5.4)
SODIUM SERPL-SCNC: 142 MEQ/L (ref 135–145)
TSH SERPL DL<=0.05 MIU/L-ACNC: 1.48 UIU/ML (ref 0.27–4.2)
WBC # BLD AUTO: 6.1 THOU/MM3 (ref 4.8–10.8)

## 2025-03-07 PROCEDURE — 36415 COLL VENOUS BLD VENIPUNCTURE: CPT | Performed by: FAMILY MEDICINE

## 2025-03-07 PROCEDURE — 99214 OFFICE O/P EST MOD 30 MIN: CPT | Performed by: FAMILY MEDICINE

## 2025-03-07 PROCEDURE — G8427 DOCREV CUR MEDS BY ELIG CLIN: HCPCS | Performed by: FAMILY MEDICINE

## 2025-03-07 PROCEDURE — 3017F COLORECTAL CA SCREEN DOC REV: CPT | Performed by: FAMILY MEDICINE

## 2025-03-07 PROCEDURE — 1036F TOBACCO NON-USER: CPT | Performed by: FAMILY MEDICINE

## 2025-03-07 PROCEDURE — 3078F DIAST BP <80 MM HG: CPT | Performed by: FAMILY MEDICINE

## 2025-03-07 PROCEDURE — 3074F SYST BP LT 130 MM HG: CPT | Performed by: FAMILY MEDICINE

## 2025-03-07 PROCEDURE — G8417 CALC BMI ABV UP PARAM F/U: HCPCS | Performed by: FAMILY MEDICINE

## 2025-03-07 SDOH — ECONOMIC STABILITY: FOOD INSECURITY: WITHIN THE PAST 12 MONTHS, THE FOOD YOU BOUGHT JUST DIDN'T LAST AND YOU DIDN'T HAVE MONEY TO GET MORE.: NEVER TRUE

## 2025-03-07 SDOH — ECONOMIC STABILITY: FOOD INSECURITY: WITHIN THE PAST 12 MONTHS, YOU WORRIED THAT YOUR FOOD WOULD RUN OUT BEFORE YOU GOT MONEY TO BUY MORE.: NEVER TRUE

## 2025-03-07 ASSESSMENT — PATIENT HEALTH QUESTIONNAIRE - PHQ9
9. THOUGHTS THAT YOU WOULD BE BETTER OFF DEAD, OR OF HURTING YOURSELF: NOT AT ALL
8. MOVING OR SPEAKING SO SLOWLY THAT OTHER PEOPLE COULD HAVE NOTICED. OR THE OPPOSITE, BEING SO FIGETY OR RESTLESS THAT YOU HAVE BEEN MOVING AROUND A LOT MORE THAN USUAL: NOT AT ALL
7. TROUBLE CONCENTRATING ON THINGS, SUCH AS READING THE NEWSPAPER OR WATCHING TELEVISION: NEARLY EVERY DAY
10. IF YOU CHECKED OFF ANY PROBLEMS, HOW DIFFICULT HAVE THESE PROBLEMS MADE IT FOR YOU TO DO YOUR WORK, TAKE CARE OF THINGS AT HOME, OR GET ALONG WITH OTHER PEOPLE: SOMEWHAT DIFFICULT
6. FEELING BAD ABOUT YOURSELF - OR THAT YOU ARE A FAILURE OR HAVE LET YOURSELF OR YOUR FAMILY DOWN: NOT AT ALL
3. TROUBLE FALLING OR STAYING ASLEEP: NOT AT ALL
2. FEELING DOWN, DEPRESSED OR HOPELESS: NOT AT ALL
5. POOR APPETITE OR OVEREATING: NOT AT ALL
4. FEELING TIRED OR HAVING LITTLE ENERGY: NOT AT ALL
SUM OF ALL RESPONSES TO PHQ QUESTIONS 1-9: 3
SUM OF ALL RESPONSES TO PHQ QUESTIONS 1-9: 3
1. LITTLE INTEREST OR PLEASURE IN DOING THINGS: NOT AT ALL
SUM OF ALL RESPONSES TO PHQ QUESTIONS 1-9: 3
SUM OF ALL RESPONSES TO PHQ QUESTIONS 1-9: 3

## 2025-03-07 ASSESSMENT — ENCOUNTER SYMPTOMS
VOMITING: 0
SORE THROAT: 0
DIARRHEA: 0
TROUBLE SWALLOWING: 0
COUGH: 0
ABDOMINAL PAIN: 0
SHORTNESS OF BREATH: 0
CONSTIPATION: 0

## 2025-03-07 NOTE — PROGRESS NOTES
Venipuncture obtained from  left arm. Patient tolerated the procedure without complications or complaints.   
pulses.      Heart sounds: No murmur heard.  Pulmonary:      Effort: Pulmonary effort is normal.      Breath sounds: Normal breath sounds. No wheezing.   Skin:     General: Skin is warm.      Comments: Photos in media tab.  Involving anterior aspect of bilateral lower legs are non blanching petechial macules    Neurological:      Mental Status: She is alert.           No results found for this visit on 03/07/25.        ASSESSMENT         Diagnosis Orders   1. Petechial rash  CBC with Auto Differential    Comprehensive Metabolic Panel    Ferritin    TSH reflex to FT4    Sedimentation Rate    C-Reactive Protein    External Referral To Dermatology    C-Reactive Protein    Sedimentation Rate    TSH reflex to FT4    Ferritin    Comprehensive Metabolic Panel    CBC with Auto Differential          PLAN     Assessment & Plan  1. Petechial Rash: Acute.  - Discontinue meloxicam due to potential contribution to the rash when used in combination with ASA.  - Conduct blood tests to assess platelet levels, blood counts, and inflammatory markers to rule out vasculitis or a drug reaction.  - Referral to a dermatologist for further evaluation.      Attestation    The patient (or guardian, if applicable) and other individuals in attendance with the patient were advised that Artificial Intelligence will be utilized during this visit to record, process the conversation to generate a clinical note, and support improvement of the AI technology. The patient (or guardian, if applicable) and other individuals in attendance at the appointment consented to the use of AI, including the recording.                     Electronically signed by Katelyn Ann Leopold, MD on 3/7/2025 at 12:56 PM

## 2025-03-08 LAB — ERYTHROCYTE [SEDIMENTATION RATE] IN BLOOD BY WESTERGREN METHOD: 13 MM/HR (ref 0–20)

## 2025-03-17 ENCOUNTER — TELEPHONE (OUTPATIENT)
Dept: FAMILY MEDICINE CLINIC | Age: 60
End: 2025-03-17

## 2025-03-17 NOTE — TELEPHONE ENCOUNTER
Kate at [facility] with Dr.patterson calling patient needs Pre-op Appointment after 4/1 but before 4/23.       Surgery 4/23, pre-op testing 4/1       Amandas ext is [phone]       Called left message for patient to call office back to schedule.

## 2025-03-21 ENCOUNTER — OFFICE VISIT (OUTPATIENT)
Dept: FAMILY MEDICINE CLINIC | Age: 60
End: 2025-03-21
Payer: COMMERCIAL

## 2025-03-21 VITALS
HEART RATE: 68 BPM | HEIGHT: 60 IN | SYSTOLIC BLOOD PRESSURE: 112 MMHG | WEIGHT: 190.38 LBS | BODY MASS INDEX: 37.37 KG/M2 | OXYGEN SATURATION: 96 % | DIASTOLIC BLOOD PRESSURE: 74 MMHG | TEMPERATURE: 96.8 F

## 2025-03-21 DIAGNOSIS — E11.69 TYPE 2 DIABETES MELLITUS WITH OTHER SPECIFIED COMPLICATION, WITHOUT LONG-TERM CURRENT USE OF INSULIN: Primary | ICD-10-CM

## 2025-03-21 DIAGNOSIS — K75.81 NASH (NONALCOHOLIC STEATOHEPATITIS): ICD-10-CM

## 2025-03-21 LAB
ALBUMIN SERPL BCG-MCNC: 3.8 G/DL (ref 3.4–4.9)
ALP SERPL-CCNC: 126 U/L (ref 35–104)
ALT SERPL W/O P-5'-P-CCNC: 39 U/L (ref 10–35)
ANION GAP SERPL CALC-SCNC: 9 MEQ/L (ref 8–16)
AST SERPL-CCNC: 46 U/L (ref 10–35)
BILIRUB SERPL-MCNC: 0.8 MG/DL (ref 0.3–1.2)
BUN SERPL-MCNC: 12 MG/DL (ref 8–23)
CALCIUM SERPL-MCNC: 8.9 MG/DL (ref 8.8–10.2)
CHLORIDE SERPL-SCNC: 101 MEQ/L (ref 98–111)
CO2 SERPL-SCNC: 32 MEQ/L (ref 22–29)
CREAT SERPL-MCNC: 0.8 MG/DL (ref 0.5–0.9)
GFR SERPL CREATININE-BSD FRML MDRD: 83 ML/MIN/1.73M2
GLUCOSE SERPL-MCNC: 95 MG/DL (ref 74–109)
HBA1C MFR BLD: 5.2 %
POTASSIUM SERPL-SCNC: 3.2 MEQ/L (ref 3.5–5.2)
PROT SERPL-MCNC: 6.9 G/DL (ref 6.4–8.3)
SODIUM SERPL-SCNC: 142 MEQ/L (ref 135–145)

## 2025-03-21 PROCEDURE — 3074F SYST BP LT 130 MM HG: CPT | Performed by: FAMILY MEDICINE

## 2025-03-21 PROCEDURE — 3044F HG A1C LEVEL LT 7.0%: CPT | Performed by: FAMILY MEDICINE

## 2025-03-21 PROCEDURE — 1036F TOBACCO NON-USER: CPT | Performed by: FAMILY MEDICINE

## 2025-03-21 PROCEDURE — G8427 DOCREV CUR MEDS BY ELIG CLIN: HCPCS | Performed by: FAMILY MEDICINE

## 2025-03-21 PROCEDURE — 99214 OFFICE O/P EST MOD 30 MIN: CPT | Performed by: FAMILY MEDICINE

## 2025-03-21 PROCEDURE — 3078F DIAST BP <80 MM HG: CPT | Performed by: FAMILY MEDICINE

## 2025-03-21 PROCEDURE — 83036 HEMOGLOBIN GLYCOSYLATED A1C: CPT | Performed by: FAMILY MEDICINE

## 2025-03-21 PROCEDURE — 36415 COLL VENOUS BLD VENIPUNCTURE: CPT | Performed by: FAMILY MEDICINE

## 2025-03-21 PROCEDURE — 2022F DILAT RTA XM EVC RTNOPTHY: CPT | Performed by: FAMILY MEDICINE

## 2025-03-21 PROCEDURE — 3017F COLORECTAL CA SCREEN DOC REV: CPT | Performed by: FAMILY MEDICINE

## 2025-03-21 PROCEDURE — G8417 CALC BMI ABV UP PARAM F/U: HCPCS | Performed by: FAMILY MEDICINE

## 2025-03-21 RX ORDER — BUPROPION HYDROCHLORIDE 150 MG/1
150 TABLET ORAL EVERY MORNING
Qty: 30 TABLET | Refills: 3 | Status: SHIPPED | OUTPATIENT
Start: 2025-03-21

## 2025-03-21 NOTE — PROGRESS NOTES
[facility] [place] [facility]  [address]  [place] [address]  Dept: [phone]     SUBJECTIVE     Suze Atkins is a 60 y.o.female    History of Present Illness  The patient presents for evaluation of a dermatological condition on her lower extremities and cognitive difficulties.    She reports a fluctuating erythematous rash on her legs, which appears to be showing slight improvement. The rash is non-pruritic. She has discontinued the use of meloxicam (Mobic) as previously advised. She does not utilize compression stockings and is currently on a regimen of acetylsalicylic acid (aspirin) and acetaminophen (Tylenol).    The patient is experiencing difficulties with focus and concentration, which have been exacerbated by a recent change in her work environment. She reports an inability to maintain attention while reading or taking quizzes on her computer, often losing track of the material when distracted by others. She expresses interest in exploring potential treatment options for these symptoms. She has a history of bupropion (Wellbutrin) use.    She is scheduled for a right knee arthroplasty on 04/23/2025. A preoperative visit is planned after 04/01/2025.    She has been on long-term aspirin therapy due to multiple transient ischemic attacks (TIAs) identified on a brain MRI in 2022.    MEDICATIONS  Current: Aspirin, Tylenol  Discontinued: Mobic    Patient Active Problem List   Diagnosis    Anxiety    Depression    OCD (obsessive compulsive disorder)    Essential hypertension    Other constipation    Adnexal mass    S/P exploratory laparotomy    Peripheral polyneuropathy    Lumbar radiculopathy    BOWERS (nonalcoholic steatohepatitis)    Diabetes mellitus (HCC)    Spinal stenosis of lumbar region without neurogenic claudication    Sacroiliac inflammation    Severe obesity (BMI 35.0-35.9 with comorbidity)    Restless legs syndrome (RLS)    Primary osteoarthritis of right hip    
Venipuncture obtained from  left arm. Patient tolerated the procedure without complications or complaints.    
\"LABMICR\", \"[id number]\"    Lab Results   Component Value Date    TSH 1.48 03/07/2025    T4FREE 1.35 06/23/2023       Lab Results   Component Value Date    WBC 6.1 03/07/2025    HGB 14.5 03/07/2025    HCT 40.1 03/07/2025    MCV 90.9 03/07/2025     03/07/2025       No results found for: \"PSA\"    Immunization History   Administered Date(s) Administered   • COVID-19, PFIZER PURPLE top, DILUTE for use, (age 12 y+), 30mcg/0.3mL 03/17/2021, 04/07/2021, 12/23/2021   • Hep A-Hep B, TWINRIX, (age 18y+), IM, 1mL 07/07/2023, 08/11/2023   • Influenza Virus Vaccine 10/27/2010, 10/19/2011, 10/29/2012, 10/22/2013, 10/14/2014   • Influenza Whole 09/16/2009   • Influenza, FLUARIX, FLULAVAL, FLUZONE (age 6 mo+) and AFLURIA, (age 3 y+), Quadv PF, 0.5mL 10/20/2015, 10/11/2016, 10/25/2017, 10/16/2018, 10/23/2019, 10/28/2020   • Influenza, FLUCELVAX, (age 6 mo+) IM, Trivalent PF, 0.5mL 10/18/2024   • Influenza, FLUCELVAX, (age 6 mo+), MDCK, Quadv PF, 0.5mL 09/15/2023   • Pneumococcal, PCV20, PREVNAR 20, (age 6w+), IM, 0.5mL 09/15/2023   • TDaP, ADACEL (age 10y-64y), BOOSTRIX (age 10y+), IM, 0.5mL 08/04/2016       Health Maintenance   Topic Date Due   • HIV screen  Never done   • Hepatitis C screen  Never done   • Breast cancer screen  Never done   • Shingles vaccine (1 of 2) Never done   • Colorectal Cancer Screen  01/28/2023   • Respiratory Syncytial Virus (RSV) Pregnant or age 60 yrs+ (1 - Risk 60-74 years 1-dose series) Never done   • Hepatitis B vaccine (3 of 3 - Hep B Twinrix 3-dose series) 01/11/2024   • COVID-19 Vaccine (4 - 2024-25 season) 09/01/2024   • Diabetic foot exam  03/29/2025   • Diabetic Alb to Cr ratio (uACR) test  06/25/2025   • Lipids  06/25/2025   • Diabetic retinal exam  10/07/2025   • Depression Monitoring  03/07/2026   • GFR test (Diabetes, CKD 3-4, OR last GFR 15-59)  03/07/2026   • A1C test (Diabetic or Prediabetic)  03/21/2026   • DTaP/Tdap/Td vaccine (2 - Td or Tdap) 08/04/2026   • Flu vaccine  Completed

## 2025-03-24 ASSESSMENT — ENCOUNTER SYMPTOMS
COUGH: 0
TROUBLE SWALLOWING: 0
DIARRHEA: 0
ABDOMINAL PAIN: 0
SORE THROAT: 0
SHORTNESS OF BREATH: 0
VOMITING: 0
CONSTIPATION: 0

## 2025-03-25 ENCOUNTER — TELEPHONE (OUTPATIENT)
Dept: FAMILY MEDICINE CLINIC | Age: 60
End: 2025-03-25

## 2025-03-25 NOTE — TELEPHONE ENCOUNTER
Pt aware, denies taking a potassium supplement or eating a banana daily, so I recommended that. Denies any other questions.

## 2025-03-25 NOTE — TELEPHONE ENCOUNTER
Patient called in stating both of her legs are really swollen and she's wondering if the blood work came back with anything of concern. She can be reached back at [phone].

## 2025-03-25 NOTE — TELEPHONE ENCOUNTER
Labs were improving on last check though her potassium was still a little low- is she taking a potassium supplement OTC or eating a banana daily?

## 2025-04-08 ENCOUNTER — LAB (OUTPATIENT)
Dept: LAB | Age: 60
End: 2025-04-08

## 2025-04-08 LAB — POTASSIUM SERPL-SCNC: 3.3 MEQ/L (ref 3.5–5.2)

## 2025-04-09 ENCOUNTER — TELEPHONE (OUTPATIENT)
Dept: FAMILY MEDICINE CLINIC | Age: 60
End: 2025-04-09

## 2025-04-09 NOTE — TELEPHONE ENCOUNTER
Patient needed confirmation where she went for physical therapy in either December or January. She was informed per her chart she went to [facility] with [facility].

## 2025-04-11 ENCOUNTER — OFFICE VISIT (OUTPATIENT)
Dept: FAMILY MEDICINE CLINIC | Age: 60
End: 2025-04-11
Payer: COMMERCIAL

## 2025-04-11 VITALS
RESPIRATION RATE: 20 BRPM | DIASTOLIC BLOOD PRESSURE: 70 MMHG | OXYGEN SATURATION: 97 % | WEIGHT: 185.38 LBS | BODY MASS INDEX: 36.4 KG/M2 | HEART RATE: 70 BPM | TEMPERATURE: 98.2 F | SYSTOLIC BLOOD PRESSURE: 108 MMHG | HEIGHT: 60 IN

## 2025-04-11 DIAGNOSIS — M17.11 PRIMARY OSTEOARTHRITIS OF RIGHT KNEE: Primary | ICD-10-CM

## 2025-04-11 DIAGNOSIS — E11.69 TYPE 2 DIABETES MELLITUS WITH OTHER SPECIFIED COMPLICATION, WITHOUT LONG-TERM CURRENT USE OF INSULIN: ICD-10-CM

## 2025-04-11 DIAGNOSIS — E87.6 HYPOKALEMIA: ICD-10-CM

## 2025-04-11 DIAGNOSIS — I10 ESSENTIAL HYPERTENSION: ICD-10-CM

## 2025-04-11 DIAGNOSIS — R94.31 LONG QT INTERVAL: ICD-10-CM

## 2025-04-11 PROCEDURE — 99214 OFFICE O/P EST MOD 30 MIN: CPT | Performed by: FAMILY MEDICINE

## 2025-04-11 PROCEDURE — 1036F TOBACCO NON-USER: CPT | Performed by: FAMILY MEDICINE

## 2025-04-11 PROCEDURE — G8417 CALC BMI ABV UP PARAM F/U: HCPCS | Performed by: FAMILY MEDICINE

## 2025-04-11 PROCEDURE — 3017F COLORECTAL CA SCREEN DOC REV: CPT | Performed by: FAMILY MEDICINE

## 2025-04-11 PROCEDURE — 3078F DIAST BP <80 MM HG: CPT | Performed by: FAMILY MEDICINE

## 2025-04-11 PROCEDURE — 3074F SYST BP LT 130 MM HG: CPT | Performed by: FAMILY MEDICINE

## 2025-04-11 PROCEDURE — 3044F HG A1C LEVEL LT 7.0%: CPT | Performed by: FAMILY MEDICINE

## 2025-04-11 PROCEDURE — 2022F DILAT RTA XM EVC RTNOPTHY: CPT | Performed by: FAMILY MEDICINE

## 2025-04-11 PROCEDURE — G8427 DOCREV CUR MEDS BY ELIG CLIN: HCPCS | Performed by: FAMILY MEDICINE

## 2025-04-11 RX ORDER — ARIPIPRAZOLE 10 MG/1
5 TABLET ORAL DAILY
Qty: 90 TABLET | Refills: 3 | Status: SHIPPED | OUTPATIENT
Start: 2025-04-11

## 2025-04-11 RX ORDER — ESCITALOPRAM OXALATE 20 MG/1
10 TABLET ORAL DAILY
Qty: 90 TABLET | Refills: 3 | Status: SHIPPED | OUTPATIENT
Start: 2025-04-11

## 2025-04-11 ASSESSMENT — ENCOUNTER SYMPTOMS
SORE THROAT: 0
VOMITING: 0
ABDOMINAL PAIN: 0
COUGH: 0
TROUBLE SWALLOWING: 0
DIARRHEA: 0
CONSTIPATION: 0
SHORTNESS OF BREATH: 0

## 2025-04-11 NOTE — PROGRESS NOTES
[facility] [place] [facility]  [address]  [place] [address]  Dept: [phone]     SUBJECTIVE     Suze Atkins is a 60 y.o.female      History of Present Illness  The patient presents for a preoperative evaluation accompanied by new symptoms.    She is scheduled for knee replacement surgery on 04/23/2025. Recent testing revealed low potassium levels, which persisted upon rechecking. Adjustments to her hydrochlorothiazide dosage have been recommended to address this issue. Additionally, an EKG showed a slightly prolonged QT interval, potentially influenced by her current high dose of Lexapro. A reduction in Lexapro dosage has been advised.    Involuntary movements around the mouth have been noted recently, possibly related to her medication regimen, including Abilify. A reduction in Abilify dosage has been suggested.    Overall, no cardiac symptoms such as chest pain, pressure, or shortness of breath during physical activity are reported. No gastrointestinal issues, changes in bowel habits, or urinary symptoms are noted. She has discontinued aspirin and has been advised to avoid ibuprofen a week prior to surgery.    Patient Active Problem List   Diagnosis    Anxiety    Depression    OCD (obsessive compulsive disorder)    Essential hypertension    Other constipation    Adnexal mass    S/P exploratory laparotomy    Peripheral polyneuropathy    Lumbar radiculopathy    BOWERS (nonalcoholic steatohepatitis)    Diabetes mellitus (HCC)    Spinal stenosis of lumbar region without neurogenic claudication    Sacroiliac inflammation    Severe obesity (BMI 35.0-35.9 with comorbidity)    Restless legs syndrome (RLS)    Primary osteoarthritis of right hip    Trochanteric bursitis of right hip       Current Outpatient Medications   Medication Sig Dispense Refill    escitalopram (LEXAPRO) 20 MG tablet Take 0.5 tablets by mouth daily 90 tablet 3    ARIPiprazole (ABILIFY) 10 MG tablet Take 0.5

## 2025-04-11 NOTE — PATIENT INSTRUCTIONS
Medication changes as follows:  Cut HCTZ in half- 1/2 tablet once daily (12.5 mg)  Cut escitalopram in half- 1/2 tablet once daily (10 mg)  Cut aripiprazole in half- 1/2 tablet once daily (5 mg)    Come in next Friday to [place] office for repeat EKG and potassium level  Keep track of your blood pressures over the next week since we are cutting down your HCTZ dose

## 2025-04-18 ENCOUNTER — LAB (OUTPATIENT)
Dept: FAMILY MEDICINE CLINIC | Age: 60
End: 2025-04-18
Payer: COMMERCIAL

## 2025-04-18 DIAGNOSIS — E87.6 HYPOKALEMIA: Primary | ICD-10-CM

## 2025-04-18 DIAGNOSIS — I45.81 LONG Q-T SYNDROME: ICD-10-CM

## 2025-04-18 LAB — POTASSIUM SERPL-SCNC: 3.6 MEQ/L (ref 3.5–5.2)

## 2025-04-18 PROCEDURE — 93000 ELECTROCARDIOGRAM COMPLETE: CPT | Performed by: FAMILY MEDICINE

## 2025-04-18 PROCEDURE — 36415 COLL VENOUS BLD VENIPUNCTURE: CPT | Performed by: FAMILY MEDICINE

## 2025-04-21 ENCOUNTER — RESULTS FOLLOW-UP (OUTPATIENT)
Dept: FAMILY MEDICINE CLINIC | Age: 60
End: 2025-04-21

## 2025-04-22 ENCOUNTER — TELEPHONE (OUTPATIENT)
Dept: FAMILY MEDICINE CLINIC | Age: 60
End: 2025-04-22

## 2025-04-22 NOTE — TELEPHONE ENCOUNTER
Kate at [facility] calling to confirm patient is cleared for surgery tomorrow.       I see she had repeated testing and everything showed improvement. Okay to give a verbal that she is cleared for surgery?         Ext [phone]

## 2025-04-28 ENCOUNTER — TELEPHONE (OUTPATIENT)
Dept: FAMILY MEDICINE CLINIC | Age: 60
End: 2025-04-28

## 2025-04-28 NOTE — TELEPHONE ENCOUNTER
Mabel from [facility] home health called with an update on patient since her surgery with Dr Gonzalez. They received a referral from [facility] for home health which patient was suppose to go to the [facility] but Dr Gonzalez felt she was able to go home. She has been staying with her daughter, who has been helping her with her dressing changes. They are doing ok with her dressing changes but patient is not able to get up and go to the bathroom, now having urinary incontinence and unable to care for herself. They have a call into Dr Gonzalez office to get her into the nursing home for rehab. They just wanted to let Dr Leopold know in case she needs to follow up later on.

## 2025-05-30 ENCOUNTER — OFFICE VISIT (OUTPATIENT)
Dept: FAMILY MEDICINE CLINIC | Age: 60
End: 2025-05-30
Payer: COMMERCIAL

## 2025-05-30 VITALS
SYSTOLIC BLOOD PRESSURE: 128 MMHG | HEIGHT: 60 IN | HEART RATE: 74 BPM | OXYGEN SATURATION: 97 % | WEIGHT: 186 LBS | BODY MASS INDEX: 36.52 KG/M2 | DIASTOLIC BLOOD PRESSURE: 70 MMHG

## 2025-05-30 DIAGNOSIS — E11.69 TYPE 2 DIABETES MELLITUS WITH OTHER SPECIFIED COMPLICATION, WITHOUT LONG-TERM CURRENT USE OF INSULIN (HCC): ICD-10-CM

## 2025-05-30 DIAGNOSIS — M79.89 BILATERAL SWELLING OF FEET: ICD-10-CM

## 2025-05-30 DIAGNOSIS — G25.81 RLS (RESTLESS LEGS SYNDROME): ICD-10-CM

## 2025-05-30 DIAGNOSIS — Z96.651 STATUS POST RIGHT KNEE REPLACEMENT: Primary | ICD-10-CM

## 2025-05-30 PROCEDURE — 99214 OFFICE O/P EST MOD 30 MIN: CPT | Performed by: FAMILY MEDICINE

## 2025-05-30 PROCEDURE — 3078F DIAST BP <80 MM HG: CPT | Performed by: FAMILY MEDICINE

## 2025-05-30 PROCEDURE — 1036F TOBACCO NON-USER: CPT | Performed by: FAMILY MEDICINE

## 2025-05-30 PROCEDURE — 3044F HG A1C LEVEL LT 7.0%: CPT | Performed by: FAMILY MEDICINE

## 2025-05-30 PROCEDURE — 2022F DILAT RTA XM EVC RTNOPTHY: CPT | Performed by: FAMILY MEDICINE

## 2025-05-30 PROCEDURE — 3017F COLORECTAL CA SCREEN DOC REV: CPT | Performed by: FAMILY MEDICINE

## 2025-05-30 PROCEDURE — 3074F SYST BP LT 130 MM HG: CPT | Performed by: FAMILY MEDICINE

## 2025-05-30 PROCEDURE — G8417 CALC BMI ABV UP PARAM F/U: HCPCS | Performed by: FAMILY MEDICINE

## 2025-05-30 PROCEDURE — G8427 DOCREV CUR MEDS BY ELIG CLIN: HCPCS | Performed by: FAMILY MEDICINE

## 2025-05-30 RX ORDER — HYDROCHLOROTHIAZIDE 12.5 MG/1
25 TABLET ORAL DAILY
Qty: 90 TABLET | Refills: 3 | Status: SHIPPED | OUTPATIENT
Start: 2025-05-30

## 2025-05-30 RX ORDER — GABAPENTIN 300 MG/1
300 CAPSULE ORAL 3 TIMES DAILY
Qty: 90 CAPSULE | Refills: 5 | Status: SHIPPED | OUTPATIENT
Start: 2025-05-30 | End: 2025-11-26

## 2025-05-30 RX ORDER — ESCITALOPRAM OXALATE 10 MG/1
10 TABLET ORAL DAILY
Qty: 90 TABLET | Refills: 3 | Status: SHIPPED | OUTPATIENT
Start: 2025-05-30

## 2025-05-30 RX ORDER — ARIPIPRAZOLE 5 MG/1
5 TABLET ORAL DAILY
Qty: 90 TABLET | Refills: 3 | Status: SHIPPED | OUTPATIENT
Start: 2025-05-30

## 2025-05-30 RX ORDER — TIRZEPATIDE 10 MG/.5ML
10 INJECTION, SOLUTION SUBCUTANEOUS WEEKLY
Qty: 2 ML | Refills: 3 | Status: CANCELLED | OUTPATIENT
Start: 2025-05-30

## 2025-05-30 ASSESSMENT — ENCOUNTER SYMPTOMS
DIARRHEA: 0
VOMITING: 0
CONSTIPATION: 0
COUGH: 0
SORE THROAT: 0
TROUBLE SWALLOWING: 0
SHORTNESS OF BREATH: 0
ABDOMINAL PAIN: 0

## 2025-05-30 NOTE — PATIENT INSTRUCTIONS
New doses on medications;  Mounjaro 12.5 mg weekly  HCTZ (hydrochlorothiazide) 12.5 mg daily  Lexapro (escitalopram) 10 mg daily  Abilify (aripiprazole) 5 mg daily  Gabapentin 300 mg - one tablet three times daily

## 2025-05-30 NOTE — PROGRESS NOTES
Due to low potassium levels, she was instructed to reduce her hydrochlorothiazide intake. Additionally, she was advised to reduce her Lexapro dosage due to a prolonged QT interval on her EKG and to halve her Abilify dosage due to movements around her mouth.    She continues to experience restless leg syndrome, which compels her to constantly move her legs. She is on a regimen of gabapentin 200 mg three times daily, which provides some relief. She also has a supply of Norco at home.    PAST SURGICAL HISTORY:  Knee replacement surgery on 04/23/2025.    Patient Active Problem List   Diagnosis    Anxiety    Depression    OCD (obsessive compulsive disorder)    Essential hypertension    Other constipation    Adnexal mass    S/P exploratory laparotomy    Peripheral polyneuropathy    Lumbar radiculopathy    BOWERS (nonalcoholic steatohepatitis)    Diabetes mellitus (HCC)    Spinal stenosis of lumbar region without neurogenic claudication    Sacroiliac inflammation    Severe obesity (BMI 35.0-35.9 with comorbidity) (HCC)    Restless legs syndrome (RLS)    Primary osteoarthritis of right hip    Trochanteric bursitis of right hip       Current Outpatient Medications   Medication Sig Dispense Refill    tirzepatide (MOUNJARO) 12.5 MG/0.5ML SOAJ injection Inject 12.5 mg into the skin every 7 days 2 mL 2    escitalopram (LEXAPRO) 10 MG tablet Take 1 tablet by mouth daily 90 tablet 3    hydroCHLOROthiazide 12.5 MG tablet Take 2 tablets by mouth daily 90 tablet 3    ARIPiprazole (ABILIFY) 5 MG tablet Take 1 tablet by mouth daily 90 tablet 3    gabapentin (NEURONTIN) 300 MG capsule Take 1 capsule by mouth 3 times daily for 180 days. 90 capsule 5    buPROPion (WELLBUTRIN XL) 150 MG extended release tablet Take 1 tablet by mouth every morning 30 tablet 3    Misc. Devices (CPAP MACHINE) MISC by Does not apply route      amLODIPine (NORVASC) 10 MG tablet Take 1 tablet by mouth daily 90 tablet 3    aspirin (ASPIRIN LOW DOSE) 81 MG EC tablet

## 2025-06-05 ENCOUNTER — HOSPITAL ENCOUNTER (OUTPATIENT)
Dept: PHYSICAL THERAPY | Age: 60
Setting detail: THERAPIES SERIES
Discharge: HOME | End: 2025-06-05
Payer: COMMERCIAL

## 2025-06-05 PROCEDURE — 97110 THERAPEUTIC EXERCISES: CPT

## 2025-06-05 PROCEDURE — 97161 PT EVAL LOW COMPLEX 20 MIN: CPT

## 2025-06-05 ASSESSMENT — KOOS JR
STRAIGHTENING KNEE FULLY: MODERATE
TWISING OR PIVOTING ON KNEE: MODERATE
BENDING TO THE FLOOR TO PICK UP OBJECT: MODERATE
RISING FROM SITTING: MODERATE
GOING UP OR DOWN STAIRS: MODERATE
HOW SEVERE IS YOUR KNEE STIFFNESS AFTER FIRST WAKING IN MORNING: MILD
STANDING UPRIGHT: MODERATE
KOOS JR TOTAL INTERVAL SCORE: 54.84

## 2025-06-05 NOTE — PROGRESS NOTES
[facility]  PHYSICAL THERAPY  [x] EVALUATION  [] DAILY NOTE (LAND) [] DAILY NOTE (AQUATIC ) [] PROGRESS NOTE [] DISCHARGE NOTE    [] OUTPATIENT REHABILITATION CENTER [facility]   [] [place] [facility]    [x] [facility]   [] [facility]    Date: 2025  Patient Name:  Suze Atkins  : 1965  MRN: [id number]  CSN: [id number]    Referring Practitioner Micah Gonzalez MD [id number]      Diagnosis  Diagnoses       M17.11 (ICD-10-CM) - Unilateral primary osteoarthritis, right knee           Treatment Diagnosis M25.561  Right Knee Pain  R53.1 Weakness  R26.2 Difficulty in walking  Z47.1 Aftercare following joint replacement surgery   Date of Evaluation 25   Additional Pertinent History Suze Atkins has a past medical history of Anxiety, Depression, Diabetes mellitus (HCC), Diverticulitis, Hypertension, Interstitial cystitis, Knee pain, right, OCD (obsessive compulsive disorder), Restless leg syndrome, Sacroiliac inflammation, and Sleep apnea.  she has a past surgical history that includes Bladder surgery (10-13-09); Abdominal adhesion surgery (10-13-09); Hysterectomy (10-13-09); Endometrial biopsy (09); Cervical disc surgery (); Colonoscopy; eye surgery; Foot surgery (Left, 2017); colectomy (); LAPAROTOMY EXPLORATORY (N/A, 2019); Pain management procedure (Right, 10/24/2023); Back Injection (Bilateral, 2024); Nerve Block (Bilateral, 3/19/2024); and Knee Arthrocentesis (Right, 2024).     Allergies Allergies   Allergen Reactions    Hydrocodone-Acetaminophen Nausea And Vomiting    Tricyclic Antidepressants Other (See Comments)      Medications   Current Outpatient Medications:     tirzepatide (MOUNJARO) 12.5 MG/0.5ML SOAJ injection, Inject 12.5 mg into the skin every 7 days, Disp: 2 mL, Rfl: 2    escitalopram (LEXAPRO) 10 MG tablet, Take 1 tablet by mouth daily, Disp: 90 tablet, Rfl: 3    hydroCHLOROthiazide 12.5 MG

## 2025-06-09 ENCOUNTER — HOSPITAL ENCOUNTER (OUTPATIENT)
Dept: PHYSICAL THERAPY | Age: 60
Setting detail: THERAPIES SERIES
Discharge: HOME | End: 2025-06-09
Payer: COMMERCIAL

## 2025-06-09 PROCEDURE — 97110 THERAPEUTIC EXERCISES: CPT

## 2025-06-09 NOTE — PROGRESS NOTES
1045   Timed Code Minutes: 45 min   Total Treatment Time: 45 min       Electronically Signed by: [place] [id number]

## 2025-06-10 ENCOUNTER — HOSPITAL ENCOUNTER (OUTPATIENT)
Dept: PHYSICAL THERAPY | Age: 60
Setting detail: THERAPIES SERIES
End: 2025-06-10
Payer: COMMERCIAL

## 2025-06-12 ENCOUNTER — HOSPITAL ENCOUNTER (OUTPATIENT)
Dept: PHYSICAL THERAPY | Age: 60
Setting detail: THERAPIES SERIES
Discharge: HOME | End: 2025-06-12
Payer: COMMERCIAL

## 2025-06-12 PROCEDURE — 97110 THERAPEUTIC EXERCISES: CPT

## 2025-06-12 NOTE — PROGRESS NOTES
Continue with current plan of care  []  Modify plan of care as follows:    []  Hold pending physician visit  []  Discharge    Time In 1303   Time Out 1345   Timed Code Minutes: 42 min   Total Treatment Time: 42 min       Electronically Signed by: Rosie Cali PTA

## 2025-06-13 ENCOUNTER — HOSPITAL ENCOUNTER (OUTPATIENT)
Dept: PHYSICAL THERAPY | Age: 60
Setting detail: THERAPIES SERIES
Discharge: HOME | End: 2025-06-13
Payer: COMMERCIAL

## 2025-06-13 PROCEDURE — 97110 THERAPEUTIC EXERCISES: CPT

## 2025-06-13 NOTE — PROGRESS NOTES
[facility]  PHYSICAL THERAPY  [] EVALUATION  [x] DAILY NOTE (LAND) [] DAILY NOTE (AQUATIC ) [] PROGRESS NOTE [] DISCHARGE NOTE    [] OUTPATIENT REHABILITATION CENTER [facility]   [] [place] [facility]    [x] [facility]   [] [facility]    Date: 2025  Patient Name:  Suze Atkins  : 1965  MRN: [id number]  CSN: [id number]    Referring Practitioner Micah Gonzalez MD [id number]      Diagnosis  Diagnoses       M17.11 (ICD-10-CM) - Unilateral primary osteoarthritis, right knee           Treatment Diagnosis M25.561  Right Knee Pain  R53.1 Weakness  R26.2 Difficulty in walking  Z47.1 Aftercare following joint replacement surgery   Date of Evaluation 25   Additional Pertinent History Suze Atkins has a past medical history of Anxiety, Depression, Diabetes mellitus (HCC), Diverticulitis, Hypertension, Interstitial cystitis, Knee pain, right, OCD (obsessive compulsive disorder), Restless leg syndrome, Sacroiliac inflammation, and Sleep apnea.  she has a past surgical history that includes Bladder surgery (10-13-09); Abdominal adhesion surgery (10-13-09); Hysterectomy (10-13-09); Endometrial biopsy (09); Cervical disc surgery (); Colonoscopy; eye surgery; Foot surgery (Left, 2017); colectomy (); LAPAROTOMY EXPLORATORY (N/A, 2019); Pain management procedure (Right, 10/24/2023); Back Injection (Bilateral, 2024); Nerve Block (Bilateral, 3/19/2024); and Knee Arthrocentesis (Right, 2024).     Allergies Allergies   Allergen Reactions    Hydrocodone-Acetaminophen Nausea And Vomiting    Tricyclic Antidepressants Other (See Comments)      Medications   Current Outpatient Medications:     tirzepatide (MOUNJARO) 12.5 MG/0.5ML SOAJ injection, Inject 12.5 mg into the skin every 7 days, Disp: 2 mL, Rfl: 2    escitalopram (LEXAPRO) 10 MG tablet, Take 1 tablet by mouth daily, Disp: 90 tablet, Rfl: 3    hydroCHLOROthiazide 12.5 MG

## 2025-06-17 ENCOUNTER — HOSPITAL ENCOUNTER (OUTPATIENT)
Dept: PHYSICAL THERAPY | Age: 60
Setting detail: THERAPIES SERIES
Discharge: HOME | End: 2025-06-17
Payer: COMMERCIAL

## 2025-06-17 PROCEDURE — 97112 NEUROMUSCULAR REEDUCATION: CPT

## 2025-06-17 PROCEDURE — 97110 THERAPEUTIC EXERCISES: CPT

## 2025-06-17 NOTE — PROGRESS NOTES
for progress note (Reporting Period: Evaluation 6-5-25 to)   Recertification Date 07/31/25   Physician Follow-Up In one year   Physician Orders    History of Present Illness      SUBJECTIVE: Patient states knee is sore today. States is trying so hard to get her knee straight. States all steri- strips have fallen off.       TREATMENT   Precautions: No kneeling for 6 more weeks   Pain: 2/10 Right knee    \"X” in shaded column indicates activity completed today    “*\" next to exercise/intervention indicates progression   Modalities Parameters/  Location  Notes                     Manual Therapy Time/Technique  Notes   Passive ROM for knee extension with overpressure* 3x10s  LEs elevated on small bolster, cues for breathing.                Exercise/Intervention   Notes   Supine:     Heel slides    Heel prop on bolster with 3# weight on R thigh   11x  2 min    X  X    AROM 110 deg  AROM 2-3 deg FN   Airdyne Bike Seat 0 6-10 min X Able to do a few full revolutions backwards with heel on pedal.   Completed at beginning of session          Seated:      Marching     LAQ     Add squeezes     Abd with orange band   10x B  10x2-3 sec B  10x5 sec  10x              Standing:      Marching     Heel/Toe raises     3-way hip     Mini squats     HS curls     Step ups on 4\" step forward and sides     Lunges onto 4\" step     Eccentric lowering at 4\" step     Rockerboard 2 directions   12*x B  15*x each  12*x B  12*x  12x B  10x B ea way  10x B  5x R  10x ea way    X  X  X  X  X  X  X  X  X                 Difficulty with  Balance 30 sec ea way   Educated on massage to right knee just not over incision yet       Sit to stands 10x   Using arms for assist and does not fully extend right leg   Stretches at steps Right knee:   Knee flexion   HS   Calves   3x20 sec  3x20 sec  3x20 sec    X  X  X    Prone laying with LEs off edge of bed for extension stretch* 2x1 min 3#  X                           Specific Interventions Next Treatment: gait

## 2025-06-19 ENCOUNTER — HOSPITAL ENCOUNTER (OUTPATIENT)
Dept: PHYSICAL THERAPY | Age: 60
Setting detail: THERAPIES SERIES
Discharge: HOME | End: 2025-06-19
Payer: COMMERCIAL

## 2025-06-19 PROCEDURE — 97110 THERAPEUTIC EXERCISES: CPT

## 2025-06-19 NOTE — PROGRESS NOTES
[facility]  PHYSICAL THERAPY  [] EVALUATION  [x] DAILY NOTE (LAND) [] DAILY NOTE (AQUATIC ) [] PROGRESS NOTE [] DISCHARGE NOTE    [] OUTPATIENT REHABILITATION CENTER [facility]   [] [place] [facility]    [x] [facility]   [] [facility]    Date: 2025  Patient Name:  Suze Atkins  : 1965  MRN: [id number]  CSN: [id number]    Referring Practitioner Micah Gonzalez MD [id number]      Diagnosis  Diagnoses       M17.11 (ICD-10-CM) - Unilateral primary osteoarthritis, right knee           Treatment Diagnosis M25.561  Right Knee Pain  R53.1 Weakness  R26.2 Difficulty in walking  Z47.1 Aftercare following joint replacement surgery   Date of Evaluation 25   Additional Pertinent History Suze Atkins has a past medical history of Anxiety, Depression, Diabetes mellitus (HCC), Diverticulitis, Hypertension, Interstitial cystitis, Knee pain, right, OCD (obsessive compulsive disorder), Restless leg syndrome, Sacroiliac inflammation, and Sleep apnea.  she has a past surgical history that includes Bladder surgery (10-13-09); Abdominal adhesion surgery (10-13-09); Hysterectomy (10-13-09); Endometrial biopsy (09); Cervical disc surgery (); Colonoscopy; eye surgery; Foot surgery (Left, 2017); colectomy (); LAPAROTOMY EXPLORATORY (N/A, 2019); Pain management procedure (Right, 10/24/2023); Back Injection (Bilateral, 2024); Nerve Block (Bilateral, 3/19/2024); and Knee Arthrocentesis (Right, 2024).     Allergies Allergies   Allergen Reactions    Hydrocodone-Acetaminophen Nausea And Vomiting    Tricyclic Antidepressants Other (See Comments)      Medications   Current Outpatient Medications:     tirzepatide (MOUNJARO) 12.5 MG/0.5ML SOAJ injection, Inject 12.5 mg into the skin every 7 days, Disp: 2 mL, Rfl: 2    escitalopram (LEXAPRO) 10 MG tablet, Take 1 tablet by mouth daily, Disp: 90 tablet, Rfl: 3    hydroCHLOROthiazide 12.5 MG

## 2025-06-20 ENCOUNTER — HOSPITAL ENCOUNTER (OUTPATIENT)
Dept: PHYSICAL THERAPY | Age: 60
Setting detail: THERAPIES SERIES
Discharge: HOME | End: 2025-06-20
Payer: COMMERCIAL

## 2025-06-20 PROCEDURE — 97110 THERAPEUTIC EXERCISES: CPT

## 2025-06-20 NOTE — PROGRESS NOTES
[facility]  PHYSICAL THERAPY  [] EVALUATION  [x] DAILY NOTE (LAND) [] DAILY NOTE (AQUATIC ) [] PROGRESS NOTE [] DISCHARGE NOTE    [] OUTPATIENT REHABILITATION CENTER [facility]   [] [place] [facility]    [x] [facility]   [] [facility]    Date: 2025  Patient Name:  Suze Atkins  : 1965  MRN: [id number]  CSN: [id number]    Referring Practitioner Micah Gonzalez MD [id number]      Diagnosis  Diagnoses       M17.11 (ICD-10-CM) - Unilateral primary osteoarthritis, right knee           Treatment Diagnosis M25.561  Right Knee Pain  R53.1 Weakness  R26.2 Difficulty in walking  Z47.1 Aftercare following joint replacement surgery   Date of Evaluation 25   Additional Pertinent History Suze Atkins has a past medical history of Anxiety, Depression, Diabetes mellitus (HCC), Diverticulitis, Hypertension, Interstitial cystitis, Knee pain, right, OCD (obsessive compulsive disorder), Restless leg syndrome, Sacroiliac inflammation, and Sleep apnea.  she has a past surgical history that includes Bladder surgery (10-13-09); Abdominal adhesion surgery (10-13-09); Hysterectomy (10-13-09); Endometrial biopsy (09); Cervical disc surgery (); Colonoscopy; eye surgery; Foot surgery (Left, 2017); colectomy (); LAPAROTOMY EXPLORATORY (N/A, 2019); Pain management procedure (Right, 10/24/2023); Back Injection (Bilateral, 2024); Nerve Block (Bilateral, 3/19/2024); and Knee Arthrocentesis (Right, 2024).     Allergies Allergies   Allergen Reactions    Hydrocodone-Acetaminophen Nausea And Vomiting    Tricyclic Antidepressants Other (See Comments)      Medications   Current Outpatient Medications:     tirzepatide (MOUNJARO) 12.5 MG/0.5ML SOAJ injection, Inject 12.5 mg into the skin every 7 days, Disp: 2 mL, Rfl: 2    escitalopram (LEXAPRO) 10 MG tablet, Take 1 tablet by mouth daily, Disp: 90 tablet, Rfl: 3    hydroCHLOROthiazide 12.5 MG

## 2025-06-25 ENCOUNTER — OFFICE VISIT (OUTPATIENT)
Dept: FAMILY MEDICINE CLINIC | Age: 60
End: 2025-06-25
Payer: COMMERCIAL

## 2025-06-25 VITALS
WEIGHT: 185 LBS | OXYGEN SATURATION: 98 % | BODY MASS INDEX: 36.32 KG/M2 | HEART RATE: 72 BPM | HEIGHT: 60 IN | RESPIRATION RATE: 21 BRPM | SYSTOLIC BLOOD PRESSURE: 128 MMHG | DIASTOLIC BLOOD PRESSURE: 84 MMHG | TEMPERATURE: 98.4 F

## 2025-06-25 DIAGNOSIS — S80.211A ABRASION OF RIGHT KNEE, INITIAL ENCOUNTER: Primary | ICD-10-CM

## 2025-06-25 PROCEDURE — 3017F COLORECTAL CA SCREEN DOC REV: CPT

## 2025-06-25 PROCEDURE — G8427 DOCREV CUR MEDS BY ELIG CLIN: HCPCS

## 2025-06-25 PROCEDURE — 3079F DIAST BP 80-89 MM HG: CPT

## 2025-06-25 PROCEDURE — 3074F SYST BP LT 130 MM HG: CPT

## 2025-06-25 PROCEDURE — 1036F TOBACCO NON-USER: CPT

## 2025-06-25 PROCEDURE — 99213 OFFICE O/P EST LOW 20 MIN: CPT

## 2025-06-25 PROCEDURE — G8417 CALC BMI ABV UP PARAM F/U: HCPCS

## 2025-06-25 RX ORDER — MUPIROCIN 2 %
OINTMENT (GRAM) TOPICAL
Qty: 1 G | Refills: 0 | Status: SHIPPED | OUTPATIENT
Start: 2025-06-25 | End: 2025-07-02

## 2025-06-25 NOTE — PROGRESS NOTES
Suze Atkins (:  1965) is a 60 y.o. female, Established patient, here for evaluation of the following chief complaint(s):  Abrasion (Right knee scratch from dog on incision from knee replacement)         Assessment & Plan  1. Post-surgical knee incision/ abrasion   - No discernible infection at the site of the incision.  - Clean with soap and water. Can apply bactroban TID while wound open.   - Advised to monitor for signs of increased redness, swelling, or drainage; contact immediately if symptoms occur.      Discussed use, benefit, and side effects of prescribed medications.  Barriers to medication compliance addressed.  All patient questions answered.  Pt voiced understanding.     Results    1. Abrasion of right knee, initial encounter  -     mupirocin (BACTROBAN) 2 % ointment; Apply topically 3 times daily., Disp-1 g, R-0, Normal    Return for As needed or if symptoms don't improve.       Subjective   History of Present Illness  The patient presents for evaluation of a knee incision.    She underwent a total knee replacement procedure, following which her dog accidentally scratched the surgical incision 2 days ago. Surgery was 2 months ago. This incident resulted in significant bleeding and pain. The area around the incision was notably red yesterday, but there has been no other drainage apart from the initial bleeding. Redness and swelling has improved today. Some tenderness still. She has been applying both ice and heat to the area, which she reports as beneficial. Prior to the scratch, there were no scabs present. Currently, a scab has formed at the site of the scratch, which is causing her discomfort. She reports no fever.    PAST SURGICAL HISTORY:  Total knee replacement on 2025    Review of Systems   Constitutional:  Negative for fever.   Musculoskeletal:  Positive for arthralgias. Negative for myalgias.   Skin:  Positive for wound.          Objective   Blood pressure 128/84, pulse

## 2025-06-27 ENCOUNTER — HOSPITAL ENCOUNTER (OUTPATIENT)
Dept: PHYSICAL THERAPY | Age: 60
Setting detail: THERAPIES SERIES
Discharge: HOME | End: 2025-06-27
Payer: COMMERCIAL

## 2025-06-27 PROCEDURE — 97110 THERAPEUTIC EXERCISES: CPT

## 2025-06-27 ASSESSMENT — KOOS JR
GOING UP OR DOWN STAIRS: SEVERE
STANDING UPRIGHT: MILD
HOW SEVERE IS YOUR KNEE STIFFNESS AFTER FIRST WAKING IN MORNING: MODERATE
RISING FROM SITTING: MILD
KOOS JR TOTAL INTERVAL SCORE: 57.14
TWISING OR PIVOTING ON KNEE: MODERATE
STRAIGHTENING KNEE FULLY: MILD
BENDING TO THE FLOOR TO PICK UP OBJECT: MODERATE

## 2025-06-27 NOTE — PROGRESS NOTES
patient next week as is a holiday so will follow up with her in two weeks. Patient has all needs for HEP.       GOALS:  Patient Goal: To get on treadmill to start walking and to be able stand up without using her cane    Short Term Goals: 4 weeks  1) Patient to demonstrate <5 deg FN right knee ext and 120 degrees flexion for ease with dressing  [] Goal Met [x] Goal Not Met [] Continue Goal [] Discontinue Goal  [x] Revise Goal  Goal Assessment: Patient with 1-2 deg FN for extension and 111 deg flexion  New Goal: Patient to demonstrate full right knee ext and 120 degrees flexion for ease with dressing  2) Patient to ambulate with improved TKE on right with use of cane as needed  [] Goal Met [x] Goal Not Met [x] Continue Goal [] Discontinue Goal  [] Revise Goal  Goal Assessment: Patient's gait improved overall and she is not using her cane much but she still has a mild limp on the right   3) Patient to be able to stand from chair without use of cane with even weight bearing between legs  [] Goal Met [x] Goal Not Met [x] Continue Goal [] Discontinue Goal  [] Revise Goal  Goal Assessment: Patient can stand from the chair  New Goal:   4) Patient to be able to step up onto a 4-6\" step with right leg to start progressing to reciprocal pattern  [] Goal Met [x] Goal Not Met [] Continue Goal [] Discontinue Goal  [x] Revise Goal  Goal Assessment: Patient can do this but she requires a lot of assist and is very unsteady. Increased pain with eccentric lowering on right.  New Goal: Patient to be able to step on and off a 4-6\" step with right leg with less assist and increased stability to start progressing to reciprocal pattern  5) Patient to demonstrate 4+-5/5 strength in right leg to perform all balance activity with little to assist for safety with all community activity  [] Goal Met [x] Goal Not Met [x] Continue Goal [] Discontinue Goal  [] Revise Goal  Goal Assessment: Patient with 4- to 4/5 throughout    Long Term Goals: 8

## 2025-07-01 ENCOUNTER — OFFICE VISIT (OUTPATIENT)
Dept: FAMILY MEDICINE CLINIC | Age: 60
End: 2025-07-01
Payer: COMMERCIAL

## 2025-07-01 VITALS
BODY MASS INDEX: 35.61 KG/M2 | SYSTOLIC BLOOD PRESSURE: 120 MMHG | OXYGEN SATURATION: 96 % | DIASTOLIC BLOOD PRESSURE: 78 MMHG | WEIGHT: 181.4 LBS | HEART RATE: 74 BPM | HEIGHT: 60 IN

## 2025-07-01 DIAGNOSIS — E87.6 HYPOKALEMIA: ICD-10-CM

## 2025-07-01 DIAGNOSIS — E78.5 HYPERLIPIDEMIA, UNSPECIFIED HYPERLIPIDEMIA TYPE: ICD-10-CM

## 2025-07-01 DIAGNOSIS — K21.9 GASTROESOPHAGEAL REFLUX DISEASE, UNSPECIFIED WHETHER ESOPHAGITIS PRESENT: ICD-10-CM

## 2025-07-01 DIAGNOSIS — I10 ESSENTIAL HYPERTENSION: ICD-10-CM

## 2025-07-01 DIAGNOSIS — G25.81 RLS (RESTLESS LEGS SYNDROME): ICD-10-CM

## 2025-07-01 DIAGNOSIS — E11.69 TYPE 2 DIABETES MELLITUS WITH OTHER SPECIFIED COMPLICATION, WITHOUT LONG-TERM CURRENT USE OF INSULIN (HCC): Primary | ICD-10-CM

## 2025-07-01 PROCEDURE — G8417 CALC BMI ABV UP PARAM F/U: HCPCS | Performed by: FAMILY MEDICINE

## 2025-07-01 PROCEDURE — G8427 DOCREV CUR MEDS BY ELIG CLIN: HCPCS | Performed by: FAMILY MEDICINE

## 2025-07-01 PROCEDURE — 2022F DILAT RTA XM EVC RTNOPTHY: CPT | Performed by: FAMILY MEDICINE

## 2025-07-01 PROCEDURE — 3074F SYST BP LT 130 MM HG: CPT | Performed by: FAMILY MEDICINE

## 2025-07-01 PROCEDURE — 3078F DIAST BP <80 MM HG: CPT | Performed by: FAMILY MEDICINE

## 2025-07-01 PROCEDURE — 3017F COLORECTAL CA SCREEN DOC REV: CPT | Performed by: FAMILY MEDICINE

## 2025-07-01 PROCEDURE — 3044F HG A1C LEVEL LT 7.0%: CPT | Performed by: FAMILY MEDICINE

## 2025-07-01 PROCEDURE — 99214 OFFICE O/P EST MOD 30 MIN: CPT | Performed by: FAMILY MEDICINE

## 2025-07-01 PROCEDURE — 1036F TOBACCO NON-USER: CPT | Performed by: FAMILY MEDICINE

## 2025-07-01 ASSESSMENT — ENCOUNTER SYMPTOMS
SORE THROAT: 0
TROUBLE SWALLOWING: 0
CONSTIPATION: 0
ABDOMINAL PAIN: 0
SHORTNESS OF BREATH: 0
VOMITING: 0
COUGH: 0
DIARRHEA: 0

## 2025-07-01 NOTE — PROGRESS NOTES
sounds. No wheezing, rhonchi or rales.   Abdominal:      General: Abdomen is flat. Bowel sounds are normal.      Palpations: Abdomen is soft.   Musculoskeletal:      Cervical back: Normal range of motion and neck supple. No tenderness.   Lymphadenopathy:      Cervical: No cervical adenopathy.   Skin:     General: Skin is warm.      Findings: No rash.   Neurological:      General: No focal deficit present.      Mental Status: She is alert.         No results found for this visit on 07/01/25.    Lab Results   Component Value Date    LABA1C 5.2 03/21/2025       Lab Results   Component Value Date    CHOL 148 06/25/2024    TRIG 106 06/25/2024    HDL 49 06/25/2024       The 10-year ASCVD risk score (Ghislaine BROWN, et al., 2019) is: 7.9%    Values used to calculate the score:      Age: 60 years      Sex: Female      Is Non- : No      Diabetic: Yes      Tobacco smoker: No      Systolic Blood Pressure: 120 mmHg      Is BP treated: Yes      HDL Cholesterol: 49 mg/dL      Total Cholesterol: 148 mg/dL  Results  Imaging   - X-ray of the knee: Good progress post-surgery      Lab Results   Component Value Date     03/21/2025    K 3.6 04/18/2025     03/21/2025    CO2 32 (H) 03/21/2025    BUN 12 03/21/2025    CREATININE 0.8 03/21/2025    GLUCOSE 95 03/21/2025    CALCIUM 8.9 03/21/2025    BILITOT 0.8 03/21/2025    ALKPHOS 126 (H) 03/21/2025    AST 46 (H) 03/21/2025    ALT 39 (H) 03/21/2025    LABGLOM 83 03/21/2025     estimated creatinine clearance is 69 mL/min (based on SCr of 0.8 mg/dL).     No components found for: \"LABMICR\", \"SDKS96GIM\"    Lab Results   Component Value Date    TSH 1.48 03/07/2025    T4FREE 1.35 06/23/2023       Lab Results   Component Value Date    WBC 6.1 03/07/2025    HGB 14.5 03/07/2025    HCT 40.1 03/07/2025    MCV 90.9 03/07/2025     03/07/2025       No results found for: \"PSA\"    Immunization History   Administered Date(s) Administered    COVID-19, PFIZER PURPLE top,

## 2025-07-11 ENCOUNTER — HOSPITAL ENCOUNTER (OUTPATIENT)
Dept: PHYSICAL THERAPY | Age: 60
Setting detail: THERAPIES SERIES
Discharge: HOME | End: 2025-07-11
Payer: COMMERCIAL

## 2025-07-11 PROCEDURE — 97110 THERAPEUTIC EXERCISES: CPT

## 2025-07-11 ASSESSMENT — KOOS JR
TWISING OR PIVOTING ON KNEE: MILD
HOW SEVERE IS YOUR KNEE STIFFNESS AFTER FIRST WAKING IN MORNING: MODERATE
KOOS JR TOTAL INTERVAL SCORE: 70.704
RISING FROM SITTING: MODERATE
GOING UP OR DOWN STAIRS: MILD

## 2025-07-11 NOTE — DISCHARGE SUMMARY
[facility]  PHYSICAL THERAPY  [] EVALUATION  [] DAILY NOTE (LAND) [] DAILY NOTE (AQUATIC ) [] PROGRESS NOTE [x] DISCHARGE NOTE    [] OUTPATIENT REHABILITATION CENTER [facility]   [] [place] [facility]    [x] [facility]   [] [facility]    Date: 2025  Patient Name:  Suze Atkins  : 1965  MRN: [id number]  CSN: [id number]    Referring Practitioner Micah Gonzalez MD [id number]      Diagnosis  Diagnoses       M17.11 (ICD-10-CM) - Unilateral primary osteoarthritis, right knee           Treatment Diagnosis M25.561  Right Knee Pain  R53.1 Weakness  R26.2 Difficulty in walking  Z47.1 Aftercare following joint replacement surgery   Date of Evaluation 25   Additional Pertinent History Suze Atkins has a past medical history of Anxiety, Depression, Diabetes mellitus (HCC), Diverticulitis, Hypertension, Interstitial cystitis, Knee pain, right, OCD (obsessive compulsive disorder), Restless leg syndrome, Sacroiliac inflammation, and Sleep apnea.  she has a past surgical history that includes Bladder surgery (10-13-09); Abdominal adhesion surgery (10-13-09); Hysterectomy (10-13-09); Endometrial biopsy (09); Cervical disc surgery (); Colonoscopy; eye surgery; Foot surgery (Left, 2017); colectomy (); LAPAROTOMY EXPLORATORY (N/A, 2019); Pain management procedure (Right, 10/24/2023); Back Injection (Bilateral, 2024); Nerve Block (Bilateral, 3/19/2024); and Knee Arthrocentesis (Right, 2024).     Allergies Allergies   Allergen Reactions    Hydrocodone-Acetaminophen Nausea And Vomiting    Tricyclic Antidepressants Other (See Comments)      Medications   Current Outpatient Medications:     tirzepatide (MOUNJARO) 12.5 MG/0.5ML SOAJ injection, Inject 12.5 mg into the skin every 7 days, Disp: 2 mL, Rfl: 2    escitalopram (LEXAPRO) 10 MG tablet, Take 1 tablet by mouth daily, Disp: 90 tablet, Rfl: 3    hydroCHLOROthiazide 12.5 MG

## 2025-07-30 RX ORDER — AMLODIPINE BESYLATE 10 MG/1
10 TABLET ORAL DAILY
Qty: 90 TABLET | Refills: 3 | Status: SHIPPED | OUTPATIENT
Start: 2025-07-30

## 2025-07-30 RX ORDER — TRAZODONE HYDROCHLORIDE 50 MG/1
TABLET ORAL
Qty: 180 TABLET | Refills: 3 | Status: SHIPPED | OUTPATIENT
Start: 2025-07-30

## 2025-07-30 RX ORDER — BUPROPION HYDROCHLORIDE 150 MG/1
TABLET ORAL
Qty: 90 TABLET | Refills: 3 | Status: SHIPPED | OUTPATIENT
Start: 2025-07-30

## 2025-07-30 NOTE — TELEPHONE ENCOUNTER
Last visit- 7/1/2025  Next visit- 10/10/2025    Requested Prescriptions     Pending Prescriptions Disp Refills    traZODone (DESYREL) 50 MG tablet [Pharmacy Med Name: TRAZODONE HYDROCHLORIDE 50MG TABLET] 60 tablet 10     Sig: TAKE TWO TABLETS DAILY AT BEDTIME, BY MOUTH.    amLODIPine (NORVASC) 10 MG tablet [Pharmacy Med Name: AMLODIPINE BESYLATE 10MG TABLET] 90 tablet 10     Sig: TAKE ONE TABLET DAILY, BY MOUTH.

## (undated) DEVICE — NEEDLE SPNL 22GA L3.5IN BLK WHTACR PNCL PNT HI FLO DISP

## (undated) DEVICE — GLOVE ORANGE PI 7 1/2   MSG9075

## (undated) DEVICE — 6 ML SYRINGE LUER-LOCK TIP: Brand: MONOJECT

## (undated) DEVICE — NEEDLE SYR 18GA L1.5IN RED PLAS HUB S STL BLNT FILL W/O

## (undated) DEVICE — HYPODERMIC SAFETY NEEDLE: Brand: MAGELLAN

## (undated) DEVICE — NEEDLE SPNL 22GA L7IN BLK HUB S STL W/ QNCKE PNT W/OUT

## (undated) DEVICE — GARMENT,MEDLINE,DVT,INT,CALF,MED, GEN2: Brand: MEDLINE

## (undated) DEVICE — SUTURE VCRL SZ 0 L18IN ABSRB VLT SUTUPAK PRECUT W/O NDL J106T

## (undated) DEVICE — CONTAINER,SPECIMEN,PNEU TUBE,4OZ,OR STRL: Brand: MEDLINE

## (undated) DEVICE — YANKAUER,POOLE TIP,STERILE,50/CS: Brand: MEDLINE

## (undated) DEVICE — GOWN,SIRUS,NON REINFRCD,LARGE,SET IN SL: Brand: MEDLINE

## (undated) DEVICE — SOLUTION IV IRRIG WATER 1000ML POUR BRL 2F7114

## (undated) DEVICE — GAUZE SPONGES,USP TYPE VII GAUZE, 12 PLY: Brand: CURITY

## (undated) DEVICE — SPONGE DRN W4XL4IN RAYON/POLYESTER 6 PLY NONWOVEN PRECUT

## (undated) DEVICE — TUBING, SUCTION, 1/4" X 20', STRAIGHT: Brand: MEDLINE INDUSTRIES, INC.

## (undated) DEVICE — SUTURE PDS II SZ 0 L27IN ABSRB VLT L36MM CT-1 1/2 CIR Z340H

## (undated) DEVICE — NEEDLE SPNL 22GA L3.5IN BLK HUB S STL REG WALL FIT STYL W/

## (undated) DEVICE — SYRINGE MED 10ML LUERLOCK TIP W/O SFTY DISP

## (undated) DEVICE — SHEET,DRAPE,3/4,53X77,STERILE: Brand: MEDLINE

## (undated) DEVICE — SYRINGE MED 3ML TRNSLUC BRL POLYPR GEN PURP W/ LUERLOCK TIP

## (undated) DEVICE — CHLORAPREP 26ML CLEAR

## (undated) DEVICE — SET-UP PACK: Brand: CONVERTORS

## (undated) DEVICE — 3 ML SYRINGE LUER-LOCK TIP: Brand: MONOJECT

## (undated) DEVICE — DRAIN SURG 10FR PVC TB W/ TRCR MID PERF NO RESVR HUBLESS

## (undated) DEVICE — GAUZE,SPONGE,4"X4",12PLY,STERILE,LF,2'S: Brand: MEDLINE

## (undated) DEVICE — GLOVE SURG SZ 6 THK91MIL LTX FREE SYN POLYISOPRENE ANTI

## (undated) DEVICE — SYRINGE MED 30ML SLIP TIP BLNT FILL AND LUERLOCK DISP

## (undated) DEVICE — CHLORAPREP 26ML ORANGE

## (undated) DEVICE — BLANKET THER AD W24XL60IN FAB COVERING SUP SFT ULT THN LTWT

## (undated) DEVICE — GLOVE ORANGE PI 7   MSG9070

## (undated) DEVICE — MARKER,SKIN,WI/RULER AND LABELS: Brand: MEDLINE

## (undated) DEVICE — GOWN,SIRUS,NONRNF,SETINSLV,XL,20/CS: Brand: MEDLINE

## (undated) DEVICE — TOWEL,OR,DSP,ST,BLUE,STD,4/PK,20PK/CS: Brand: MEDLINE

## (undated) DEVICE — GLOVE ORANGE PI 8   MSG9080

## (undated) DEVICE — 4-PORT MANIFOLD: Brand: NEPTUNE 2

## (undated) DEVICE — SUTURE VCRL SZ 3-0 L27IN ABSRB UD L26MM SH 1/2 CIR J416H

## (undated) DEVICE — NEEDLE SPNL 22 GAX5 IN HUB QNCKE FUNNEL SHP STYL BLK SPINCAN

## (undated) DEVICE — KIT EVAC 400CC PVC RADPQ Y CONN

## (undated) DEVICE — GLOVE ORANGE PI 8 1/2   MSG9085

## (undated) DEVICE — STRIP,CLOSURE,WOUND,MEDI-STRIP,1/2X4: Brand: MEDLINE

## (undated) DEVICE — YANKAUER,BULB TIP,W/O VENT,RIGID,STERILE: Brand: MEDLINE

## (undated) DEVICE — SOLUTION IV 1000ML LAC RINGERS PH 6.5 INJ USP VIAFLX PLAS

## (undated) DEVICE — GLOVE SURG SZ 65 THK91MIL LTX FREE SYN POLYISOPRENE

## (undated) DEVICE — SOLUTION IV 1000ML 0.9% SOD CHL PH 5 INJ USP VIAFLX PLAS

## (undated) DEVICE — Z DISCONTINUED USE 2857930 NEEDLE HYPO 25GA L1.5IN BVL ORIENTED ECLIPSE

## (undated) DEVICE — SUTURE ABSORBABLE BRAIDED 2-0 CT-1 27 IN UD VICRYL J259H

## (undated) DEVICE — BLADE CLIPPER GEN PURP NS

## (undated) DEVICE — TOTAL TRAY, DB, 100% SILI FOLEY, 16FR 10: Brand: MEDLINE

## (undated) DEVICE — SPONGE LAP W18XL18IN WHT COT 4 PLY FLD STRUNG RADPQ DISP ST

## (undated) DEVICE — E-Z CLEAN, NON-STICK, PTFE COATED, ELECTROSURGICAL BLADE ELECTRODE, 6.5 INCH (16.5 CM): Brand: MEGADYNE

## (undated) DEVICE — ROYAL SILK SURGICAL GOWN, XXL: Brand: CONVERTORS

## (undated) DEVICE — BLANKET WRM W29.9XL79.1IN UP BODY FORC AIR MISTRAL-AIR

## (undated) DEVICE — BANDAGE,GAUZE,4.5"X4.1YD,STERILE,LF: Brand: MEDLINE

## (undated) DEVICE — 6 ML SYRINGE REGULAR TIP: Brand: MONOJECT

## (undated) DEVICE — DRAIN CHN 19FR L0.25IN DIA6.3MM SIL RND HUBLESS FULL FLUT

## (undated) DEVICE — EVACUATOR SURG 100CC SIL BLB SUCT RESVR FOR CLS WND DRNGE